# Patient Record
Sex: MALE | Race: WHITE | NOT HISPANIC OR LATINO | Employment: FULL TIME | URBAN - METROPOLITAN AREA
[De-identification: names, ages, dates, MRNs, and addresses within clinical notes are randomized per-mention and may not be internally consistent; named-entity substitution may affect disease eponyms.]

---

## 2018-09-21 ENCOUNTER — TRANSFERRED RECORDS (OUTPATIENT)
Dept: HEALTH INFORMATION MANAGEMENT | Facility: CLINIC | Age: 53
End: 2018-09-21

## 2018-10-09 ENCOUNTER — BEH TREATMENT PLAN (OUTPATIENT)
Dept: BEHAVIORAL HEALTH | Facility: CLINIC | Age: 53
End: 2018-10-09
Attending: PSYCHIATRY & NEUROLOGY

## 2018-10-09 ENCOUNTER — HOSPITAL ENCOUNTER (OUTPATIENT)
Dept: BEHAVIORAL HEALTH | Facility: CLINIC | Age: 53
End: 2018-10-09
Attending: PSYCHIATRY & NEUROLOGY
Payer: COMMERCIAL

## 2018-10-09 DIAGNOSIS — F31.81 DEPRESSED BIPOLAR II DISORDER (H): ICD-10-CM

## 2018-10-09 DIAGNOSIS — F31.81 BIPOLAR 2 DISORDER (H): ICD-10-CM

## 2018-10-09 PROCEDURE — 90791 PSYCH DIAGNOSTIC EVALUATION: CPT

## 2018-10-09 ASSESSMENT — PAIN SCALES - GENERAL: PAINLEVEL: NO PAIN (0)

## 2018-10-09 NOTE — PROGRESS NOTES
" Standard Diagnostic Assessment     CLIENT'S NAME: Dash Arizmendi  MRN:   [id number]  :   1965 AGE:53 year old SEX: male  ACCT. NUMBER: [id number]  DATE OF SERVICE: 10/09/18 Start Time:  1015 End Time:  1145    Home Phone [phone]   Work Phone Not on file.   Mobile [phone]     Preferred Phone:   May we leave a program related message? yes    Yes, the patient has been informed that any other mental health professional providing mental health services to me will need access to this Diagnostic Assessment in order to develop a treatment plan and receive payment.     Identifying Information:  Dash Arizmendi is a 53 year old, Choose not to answer,  male. Dash attended the   alone.     Reason for Referral: Dash was referred to [facility] ()  by Dr. Marni Suarez. Dash reports the reason for referral at this time is depression.    Dash verbalizes the following treatment/discharge goals: \"be able to concentrate so can do my job, fix the depression so I can be happier and normal, don't want to spend all my time either working or sleeping/want a normal life, want to enjoy hobbies again\".    Current Stressors/Losses/Disappointments: job, struggles to see if its the job that's the problem or him, some regret about leaving last job    Per Client, Review of Symptoms:  Mood (Depression/Anxiety/Leonarda/Anger): depressed mood, feeling of hopelessness, feeling of worthlessness, irritability, low interest in usual activities, thinking it would be better to die, constant worry, poor concentration, trouble remembering things, excessive sleep that does not feel restful, low motivation, low energy, having to check/recheck what has already been done, hearing muffled sounds or a bell (knows that it is not real), nightmares  Psychosis: occasionally hears muffled sounds  Trauma:none reported  Other:     Mental Health History:  Dash reports first onset of mental health symptoms probably in " 20's.  Dash was first diagnosed age 30, with bipolar.   Dash received the following mental health services in the past: counseling and psychiatry.   Psychiatric Hospitalizations: None.   Dash denies a history of civil commitment.      Onset/Duration/Pattern of Symptoms noted above:     Dash reports the following understanding of his diagnosis: depression, maybe bipolar.      Personal Safety:    [place]-Suicide Severity Rating Scale   Suicide Ideation   1.) Have you ever wished you were dead or that you could go to sleep and not wake up?     Lifetime: Yes, (if yes, please discribe) :  Past Month:  Yes, (if yes, please discribe) : Thoughts that it would be better if something happened to him and he    2.) Have you actually had any thoughts of killing yourself?   Lifetime:  Yes, (if yes, please discribe) : kind of. Used to have really constant thoughts about what it would be like to jump from a building: would you experience the hitting of the ground? Past Month:  No   3.) Have you been thinking about how you might do this?     Lifetime:  No Past Month:  No   4.) Have you had these thoughts and had some intention of acting on them?     Lifetime:  No Past Month:  No   5.) Have you started to work out the details of how to kill yourself?   Lifetime:  No Past Month:  No   6.) Do you intend to carry out this plan?      Lifetime:  No Past Month:  No   Intensity of Ideation   Intensity of ideation (1 being least severe, 5 being most severe):     Lifetime:  4, description of Ideation: it was pretty constant and seemed really attractive, but never took any steps to do anything like that                                                                                                Past Month:  1, description of Ideation: just these ideas that it would be better if something happened to me, but not really suicide. Gives example of giving blood recently with the thought that maybe something would go wrong, though he  says he also knew this was extremely unlikely   How often do you have these thoughts? 2-5 times in a week    When you have the thoughts how long do they last?  Less than 1 hours/some of the time   Can you stop thinking about killing yourself or wanting to die if you want to?  Does not attempt to control thoughts    Are there things - anyone or anything (i.e. family, Catholic, pain of death) that stopped you from wanting to die or acting on thoughts of suicide?  Protective factors definately stopped you from attempting suicide      What sort of reasons did you have for thinking about wanting to die or killing yourself (ie end pain, stop how you were feeling, get attention or reaction, revenge)?  Completely to end or stop the pain (youcouldn't go on living with the pain or how you were feeling)   Suicidal Behavior   (Suicide Attempt) - Have you made a suicide attempt?     Lifetime:  No Past Month: No   Have you engaged in self-harm (non-suicidal self-injury)?  No   (Interrupted Attempt) - Has there been a time when you started to do something to end your life but someone or something stopped you before you actually did anything?  No   (Aborted or Self-Interrupted Attempt) - Has there been a time when you started to do something to try to end your life but you stopped yourself before you actually did anything?  No   (Preparatory Acts of Behavior) - Have you taken any steps towards making suicide attempt or preparing to kill yourself (such as collecting pills, getting a gun, giving valuables away or writing a suicide note)? No   Actual Lethality/Medical Damage:   0. No physical damage or very minor physical damage (e.g., surface scratches).   1. Minor physical damage (e.g., lethargic speech; first-degree burns; mild bleeding; sprains).  2. Moderate physical damage; medical attention needed (e.g., conscious but sleepy, somewhat responsive; second-degree burns; bleeding of major vessel).  3. Moderately severe physical  damage; medical hospitalization and likely intensive care required (e.g., comatose with reflexes intact; third-degree burns less than 20% of body; extensive blood loss but can recover; major fractures).  4. Sever physical damage; medical hospitalization with intensive care required (e.g., comatose without reflexes; third-degree burs over 20% of body; extensive blood loss with unstable vital sign; major damage to a vital area).  5. Death    No attempts       2008  [facility]  Used with permission by Rochelle Paredes, PhD.               Guide to C-SSRS Risk Ratings   NO IDEATION:  with no active thoughts IDEATION: with a wish to die. IDEATION: with active thoughts. Risk Ratings   If Yes No No 0 - Very Low Risk   If NA Yes No 1 - Low Risk   If NA Yes Yes 2 - Low/moderate risk   IDEATION: associated thoughts of methods without intent or plan INTENT: Intent to follow through on suicide PLAN: Plan to follow through on suicide Risk Ratings cont...   If Yes No No 3 - Moderate Risk   If Yes Yes No 4 - High Risk   If Yes Yes Yes 5 - High Risk   The patient's ADDITIONAL RISK FACTORS and lack of PROTECTIVE FACTORS may increase their overall suicide risk ratings.      Additional Risk Factors:    Significant history of having untreated or poorly treated mental health symptoms   Protective Factors: Children in the home , Sense of responsibility to family, Positive social support and Positive therapeutic releationships       Risk Status   Risk Rating: Low to moderate risk- DA Staff:  SBAR to Tx team.    Additional information to support suicide risk rating:  OR There was no additional information to provide at this time.  Please see the above suicide risk rating information.       Additional Safety Questions:    Do you have a gun, weapons or other means (including medications) to harm yourself available to you? No   Do you take chances with your safety?   no   Have you ever thought about killing  someone else? No   Have you ever heard voices telling you to harm yourself or others? No       Supports:   From whom do you receive support and how often? (family/friends/agency) wife     Do your support people want/need education/resources? yes        Is there anything in your life (current or history) that is satisfying to you (include leisure interests/hobbies)?   yes relationship with family      Hope/Belief System:  Do you believe things can get better? yes that's why I'm here       Personal Safety Summary:          Dash denies current fears or concerns for personal safety.    Completed safety coping plan? yes        Substance Use History:   Reports alcohol use starting in early 20's. Also reports about a month in early 20's doing some experimenting with LSD. No LSD since then.In later 20's had a couple of friends express concern about amount of his alcohol use. Realized he was using it to self-medicate, sought out help, got on medications and stopped using alcohol like that, so was not drinking problematically. Says drinking alcohol does help him feel calmer sometimes still. Says he may have 2-5 drinks in a week, but no more than that. Says he is okay with no use while in program.     Substance Use Disorder Treatment: Dash is currently receiving the following services: No indications of CD issues.       CAGE-AID:  Have you ever felt you ought to cut down on your drinking or drug use?   Yes    Have people annoyed you by criticizing your drinking or drug use?   No    Have you ever felt bad or guilty about your drinking or drug use?   Yes    Have you ever had a drink or used drugs first thing in the morning to steady your nerves or to get rid of a hangover?  No    Do you feel these issues have been adequately addressed? Yes not drinking much, okay with no use.    Chemical Dependency Assessment Recommended?  No        Dash has a negative Cage-Aid score.       Legal History:    Dash reports that he has not  been involved with the legal system.   ________________________________________________________________________    Life Situation (Employment/School/Finances/Basic Needs):  Dash  is currently living with wife and son in a house.   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  Dash denies a history of gambling and denies a history of gambling treatment.     Dash reports his highest level of education is some college  Dash did not identify any learning problems   Dash describes academic performance as: good, but always found classes boring   Dash describes school social experience as: ok     Dash denies concerns regarding his current ability to meet basic needs.     Social/Family History:  Dash  reports he grew up in [place].   Dash was the first born of two children.   Dash reports his biological parents are both     Dash describes his childhood as did not really feel connected in his family. His dad  when Abisai was age [age]. His mom was briefly re- sometime after that and had another child (Abisai's half-brother). After she ended that marriage, it was just the three of them and to some extent Abisai's grandparents (maternal). Says he always felt a bit on the outside in that unit. He notes his mom and half-brother had same last name while he did not and this ws a meaningful indicator of his place in that system. He moved out after completing high school and never lived with her again. Had some issues with his mom, describes her as a very controlling person. Says when she  they had been estranged for some time.   Dash describes his current relationships with his family of origin as both parents are . Has no contact with  half-brother except for a text at [employer].     Dash identifies his relationship status as: . Together 20+ years, just got  in 2002. Says the marriage is good, makes several references to his wife being very aware of his behaviors and clear in expectations for not getting into unhealthy choices.   Dash identifies his sexual orientation as: opposite sex   Dash denies sexual health concerns.     Dash reports having one children. Son, age 15. Says he is a great kid, really feels good about being a parent    Dash describes the quantity/quality of his social relationships as not much time with friends        Significant Losses / Trauma / Abuse / Neglect Issues / Developmental Incidents:  Dash denies significant loss/trauma/abuse/neglect issues/developmental incidents         Dash denies personal  experience.     Mandaeism Preference/Spiritual Beliefs/Cultural Considerations:     A. Ethnic Self-Identification:  Dash self-identifies his race/ethnicities as:  and his preferred language to be English.   Dash reports he does not need the assistance of an . Dash  reports he does not need other support or modifications involved in therapy.      B. Do you experience cultural bias (the practice of interpreting judging behavior by standards inherent to one's own culture) by other people as a stressor? If yes, describe how this relates to overall mental health symptoms.  No    C. Are there any cultural influences that may need to be considered for your treatment?  (This includes historical, geographical and familial factors that affect assessment and intervention processes). No, Denies any cultural influences or concerns that need to be considered for treatment    Strengths/Vulnerabilities:   Dash identifies his personal strengths as: goal-focused, insightful, intelligent, open to learning and responsible parent .   Things that may interfere with the clients success  in treatment include: none reported.   Other identified areas of vulnerability include: Suicidal Ideation  Anxiety with/without panic attacks  Depressive symptoms.     Medical History / Physical Health Screen:     Primary Care Physician: Dash has a [facility] Provider, who is named Marni Suarez.   Last Physical Exam: greater than a year ago and client was encouraged to schedule an exam with PCP.    Mental Health Medication Management Provider / Psychiatrist: Dash has a psychiatrist whose name and location are: Dr. Suarez.         Current medications including prescription, non-prescription, herbals, dietary aids and vitamins:  Per client report:   Outpatient Prescriptions Marked as Taking for the 10/9/18 encounter (Hospital Encounter) with [facility]   Medication Sig     SERTRALINE HCL PO Take 100 mg by mouth 2 times daily     Topiramate (TOPAMAX PO) Take 100 mg by mouth       Dash reports current medications are: not sure.   Dash describes taking his medications as: Independent.  Dash reports taking prescribed medications as prescribed.     Dash provides the following current assessment of pain:  ;  ;  .     Dash provides the following information regarding past significant medical conditions/diagnoses:      Medical:  No past medical history on file.    Surgical:  No past surgical history on file.  Allergy:   Dash reports Allergies not on file     Family History of Medical, Mental Health and/or Substance Use problems:  Per client report: No family history on file.    Dash reports no current medical concerns.      General Health:   Have you had any exposure to any communicable disease in the past 2-3 weeks? no     Are you aware of safe sex practices? yes     Is there a possibility of pregnancy?  no       Nutrition:    Are you on a special diet? If yes, please explain:  no   Do you have any concerns regarding your nutritional status? If yes, please explain:  no   Have you  had any appetite changes in the last 3 months?  No     Have you had any weight loss or weight gain in the last 3 months?  No     Do you have a history of an eating disorder? no   Do you have a history of being in an eating disorder program? no   NOTE: BMI to be calculated following program admission.    Fall Risk:   Have you had any falls in the past 3 months? no     Do you currently use any assistive devices for mobility?   no     NOTE: If client reports 3 or more falls in the past 3 months, the client will not be accepted into the program until further assessment is completed by the program nurse. Check if a nurse is available to assess at time of DA.    NOTE: If client reports 2 falls in the past 3 months and/or the client currently uses assistive devices for mobility, the  will send an in-basket to the program nurse to meet with the client within the first week of programming.    Head Injury/Trauma:   Do you have a history of head injury / trauma? no     Do you have any cognitive impairment? no       Per completion of the Medical History / Physical Health Screen, is there a recommendation to see / follow up with a primary care physician/clinic?    No.      Clinical Findings     Mental Status Assessment/Clinical Observation:  Appearance:   awake, alert  Eye Contact:   good  Psychomotor Behavior: Normal  no evidence of tardive dyskinesia, dystonia, or tics  Attitude:   Cooperative    Oriented to:   All    Speech   Rate / Production: Normal    Volume:  Normal   Mood:    Depressed     Affect:    Constricted      Thought Content:  Clear  no evidence of suicidal ideation or homicidal ideation  Thought Form:  logical, linear and goal oriented no loose associations  Insight:    good    Judgment:     intact  Attention Span/Concentration: intact  Recent and Remote Memory:  intact      Psychiatric Diagnosis:    296.89 Bipolar II Disorder Depressed    Provisional Diagnostic Hypothesis (Explain R/O, other Provisional  "Diagnosis, and why alternative Diagnosis that were considered were ruled out):       Medical Concerns that may Impact Treatment:   None reported    Psychosocial and Contextual Factors (V-Codes):  V62.29 Other problem related to employment struggles with work    WHODAS 2.0 SCORE: 21/93 %      Client and family participation in assessment:   Dash was alone during this assessment.   This assessment does include collateral information.      Summary & Recommendations  Provide a brief summary of how diagnostic criteria is met (symptoms, duration & functional impairment), cause, prognosis, and likely consequences of symptoms. Include overview of pertinent client strengths, cultural influences, life situations, relationships, health concerns and how diagnosis interacts/impacts with client's life. Recommendations include: client preferences, prioritization of needed mental health, ancillary or other services and any referrals to services required by statute or rule.     Abisai is a 53-year-old, , ,  who has been severely impacted by symptoms of his mood disorder, being in a depressive phase currently. He has dealt with bipolar II disorder since his 20's and this has been a struggle at several points in his life in the intervening years, but the current depressive phase has negatively impacted his ability to work, made it difficult to do daily self-care, and has involved passive thoughts of suicide. He often hopes \"something will happen\" that result in his death, but he is not expressing any intent to act on those thoughts, exactly. He did recently donate blood with the idea that \"maybe\" something would go wrong and he would die, but he knew this was exceedingly unlikley. He also has thoughts about jumping off tall buildings and what it might feel like, but he says he then avoids going to high places. He says he never wants to do anything to hurt his son, but at the same time, the wish to die is " "quite clear. He typically likes his work and performs well at it, but this has not been the case in the last year or so and this is worse in the last month. He complains of poor concentration in particular getting in his way. He changed jobs about 6 months ago in the hopes of finding something better,and he says he is indeed working for a \"great company\", but he is still struggling. Distant background includes being raised by a single mother who is described as having \"some sort\" of mental illness and feeling like an outsider in his family and eventually having little to no engagement with his family of origin as he got older. His mother is  now, but he describes leaving a visit with her at one point in his late 20's, knowing he would not see her again because he would be dead (none of this turned out to be the case). This was one of his earliest memories of having thoughts about wanting to die or expecting to die. Abisai has had psychiatric care and a smattering of individual therapy for his mental health issues, but no history of hospitalizations. When his symptoms started to worsen this year, his psychiatrist tried some medication adjustments, but did not see any improvement an indeed became quite concerned at the last appointment as Abisai was beginning to have nightmares/increasing sleep disturbance and this she considered quite a negative sign of worsening. She referred Abisai to partial hospitalization for support in stabilization. Abisai wants to \"be able to concentrate so I can do my job, fix the depression so I can be happier and normal; I  don't want to spend all my time either working or sleeping/ I want a normal life, I want to enjoy hobbies again\".  This provides a good overview of how symptoms are broadly impacting Abisai and his goals for his life, and Partial does seem a reasonable step toward stabilization. He would likely benefit from a review of his medication regimen,  Learning to create more lifestyle " balance, increasing routine self-care, improving his support (currently his support is just his wife), learning ways to challenge his negative thinking that increases his stress levels, and starting to put more attention toward creating a life worth living. Follow up care will need to include psychiatric care and individual therapy and he does need help finding a therapist he can connect with. He may well benefit from joining a support group as well, to increase his social connectedness.       Prognosis is Good. Without the recommended intervention, the client is likely to experience the following consequences of their symptoms: increased risk for suicide, decreased ability to function, increased risk of hospitalization.    Referrals to services required by statute or rule:   Report to child/adult protection services was NA.   Referral to another professional/service is not indicated at this time..    Program Recommendation: [facility] () .      Assessment Completed by: Ju Gray

## 2018-10-09 NOTE — PROGRESS NOTES
"Adult Outpatient Mental Health Programs    MY COPING PLAN FOR SAFETY    NAME: Dash Arizmendi  MRN: [id number]  SAFETY PLAN:  Step 1: Warning signs / cues (Thoughts, images, mood, situation, behavior) that a crisis may be developing:    Thoughts: \"People would be better off without me\" and \"I can't do this anymore\"    Images: obsessive thoughts of death or dying: jumping from high places    Thinking Processes: intrusive thoughts (bothersome, unwanted thoughts that come out of nowhere): of I hate my life    Mood: worsening depression and hopelessness    Behaviors: isolating/withdrawing , using alcohol and not taking care of myself    Situations: n/a   Step 2: Coping strategies - Things I can do to take my mind off of my problems without contacting another person (relaxation technique, physical activity):    Distress Tolerance Strategies:  lose myself in TV show    Physical Activities: n/a    Focus on helpful thoughts:  \"This is temporary\" and remind myself of what is important to me: family  Step 3: People and social settings that provide distraction:   Name: Car     [employer]   Step 4: Remind myself of people and things that are important to me and worth living for:  My family  Step 5: When I am in crisis, I can ask these people to help me use my safety plan:   Name: Car Step 6: Making the environment safe:     be around others and avoid high places  Step 7: Professionals or agencies I can contact during a crisis:    Suicide Prevention Lifeline: 5-606-584-TALK (3078)    Crisis Text Line Service (available 24 hours a day, 7 days a week): Text [place] to 805263    Call  **CRISIS (816657) from a cell phone to talk to a team of professionals who can help you.  Crisis Services By County: Phone Number:   [place]     [phone]   [place]    [phone]   [place]    [phone]   [place]    [phone]   [place]    [phone]   [place] [phone]   [place]     [phone]       Call 911 or go to my nearest emergency " department.     I helped develop this safety plan and agree to use it when needed.  I have been given a copy of this plan.      Client signature _________________________________________________________________  Today s date:  10/9/2018  Adapted from Safety Plan Template 2008 Delicia Palma and Piter Kohli is reprinted with the express permission of the authors.  No portion of the Safety Plan Template may be reproduced without the express, written permission.  You can contact the authors at [email or web address] or [email or web address].

## 2018-10-09 NOTE — PROGRESS NOTES
Initial Individual Treatment Plan     Patient: Dash Arizmendi   MRN: [id number]  : 1965  Age: 53 year old  Sex: male    Diagnostic Assessment Date / Date of Initial Individual Treatment Plan: 10/09/18      Immediate Health Concerns:  No     Immediate Safety Concerns:  Yes. Identify safety concern and plan to address: passive thoguhts about     Identify the issues to be addressed in treatment:  Symptom Management, Personal Safety, Community Resources/Discharge Planning, Develop / Improve Independent Living Skills and Develop Socialization / Interpersonal Relationship Skills    Client Initial Individualized Goals for Treatment: be able to concentrate so can do my job, fix the depression so I can be happier and normal, don't want to spend all my time either working or sleeping/want a normal life, want to enjoy hobbies agian    Initial Treatment suggestions for the client during the time between Diagnostic Assessment and completion of the Individualized Treatment Plan:  Follow Safety Plan  Abstain from Substance Use   Ask for more information, support and/or assistance as needed.  Follow up with providers/community supports as needed: psychiatrist  Report increases or changes in symptoms to staff.  Report any personal safety concerns to staff.   Take medications as prescribed.  Report medication changes and/or side effects to staff.  Attend and participate in groups as scheduled or notify staff if unable to do so.  Report any use of substances to staff as this may impact your symptoms and/or personal safety.  Notify staff if you have any other issues that need to be addressed. This may include any current abuse / neglect / exploitation or other vulnerability.  Follow recommendations of your treatment team and discuss concerns if not in agreement.     Treatment Team Responsible: [facility] ()      Therapeutic Interventions/Treatment Strategies may include:  Support, Redirection, Feedback,  Limit/Boundaries, Safety Assessments, Structured Activity, Problem Solving, Clarification, Education, Motivational Enhancement and Relapse Prevention as needed.    Ju Gray

## 2018-10-09 NOTE — PROGRESS NOTES
Acknowledgement of Current Treatment Plan       I have reviewed my treatment plan with my therapist / counselor on 10/9/18. I agree with the plan as it is written in the electronic health record.    Name Signature   Dash Arizmendi    Name of Therapist / Counselor    YAIR White

## 2018-10-11 ENCOUNTER — HOSPITAL ENCOUNTER (OUTPATIENT)
Dept: BEHAVIORAL HEALTH | Facility: CLINIC | Age: 53
End: 2018-10-11
Attending: PSYCHIATRY & NEUROLOGY
Payer: COMMERCIAL

## 2018-10-11 PROBLEM — F31.81 DEPRESSED BIPOLAR II DISORDER (H): Status: ACTIVE | Noted: 2018-10-11

## 2018-10-11 PROCEDURE — H0035 MH PARTIAL HOSP TX UNDER 24H: HCPCS

## 2018-10-11 ASSESSMENT — ANXIETY QUESTIONNAIRES
1. FEELING NERVOUS, ANXIOUS, OR ON EDGE: NOT AT ALL
5. BEING SO RESTLESS THAT IT IS HARD TO SIT STILL: NOT AT ALL
6. BECOMING EASILY ANNOYED OR IRRITABLE: SEVERAL DAYS
3. WORRYING TOO MUCH ABOUT DIFFERENT THINGS: NOT AT ALL
2. NOT BEING ABLE TO STOP OR CONTROL WORRYING: SEVERAL DAYS
7. FEELING AFRAID AS IF SOMETHING AWFUL MIGHT HAPPEN: NOT AT ALL
IF YOU CHECKED OFF ANY PROBLEMS ON THIS QUESTIONNAIRE, HOW DIFFICULT HAVE THESE PROBLEMS MADE IT FOR YOU TO DO YOUR WORK, TAKE CARE OF THINGS AT HOME, OR GET ALONG WITH OTHER PEOPLE: SOMEWHAT DIFFICULT
GAD7 TOTAL SCORE: 3

## 2018-10-11 ASSESSMENT — PATIENT HEALTH QUESTIONNAIRE - PHQ9: 5. POOR APPETITE OR OVEREATING: SEVERAL DAYS

## 2018-10-11 NOTE — PROGRESS NOTES
Adult Mental Health Outpatient Group Therapy Progress Note     Client Initial Individualized Goals for Treatment:       Initial Treatment suggestions for the client during the time between Diagnostic Assessment and completion of the Individualized Treatment Plan:  Follow Safety Plan   Ask for more information, support and/or assistance as needed.  Follow up with providers/community supports as needed:   Report increases or changes in symptoms to staff.  Report any personal safety concerns to staff.   Take medications as prescribed.  Report medication changes and/or side effects to staff.  Attend and participate in groups as scheduled or notify staff if unable to do so.  Report any use of substances to staff as this may impact your symptoms and/or perrsonal safety.  Notify staff if you have any other issues that need to be addressed. This may include any current abuse / neglect / exploitation or other vulnerability.  Follow recommendations of your treatment team and discuss concerns if not in agreement.      Area of Treatment Focus:  Symptom Management and Develop / Improve Independent Living Skills    Therapeutic Interventions/Treatment Strategies:  Support, Feedback, Safety Assessment, Structured Activity, Problem Solving and Education    Response to Treatment Strategies:  Accepted Feedback, Gave Feedback, Listened, Focused on Goals, Attentive, Accepted Support and Alert    Name of Group:  Group therapy [id number], Interpersonal Effectiveness [id number]  Group Participants:     Description and Outcome:  Group therapy  Client reports he has bipolar disorder and  is currently depressed.  He said he is having focus and concentration problems and it is interfering with his ability to do his work.  He said he works from home so he hides how long it is taking him to complete his work.  He said he needs to learn skills to manage his symptoms so he can get back to a reasonable work schedule and rebuild his support network.   No SI reported.  Client verbalized understanding of session content by problem solving with group members and accepting feedback.  Interpersonal Effectiveness  Client participated in an exercise on values clarification.  Client used a card sort to examine important that are driving behavior, evaluation, and attitudes toward himself and his interactions with others.  Client reported difficulty choosing which values are important to him as the current stress makes everything seem more important.  Group members validated the feeling and discussed what they want versus what they are living currently.  Client benefitted from this group by problem solving and increasing self-awareness.      Is this a Weekly Review of the Progress on the Treatment Plan?  No

## 2018-10-11 NOTE — PROGRESS NOTES
Adult Mental Health Outpatient Group Therapy Progress Note     Client Initial Individualized Goals for Treatment:       Initial Treatment suggestions for the client during the time between Diagnostic Assessment and completion of the Individualized Treatment Plan:  Follow Safety Plan   Ask for more information, support and/or assistance as needed.  Follow up with providers/community supports as needed:   Report increases or changes in symptoms to staff.  Report any personal safety concerns to staff.   Take medications as prescribed.  Report medication changes and/or side effects to staff.  Attend and participate in groups as scheduled or notify staff if unable to do so.  Report any use of substances to staff as this may impact your symptoms and/or perrsonal safety.  Notify staff if you have any other issues that need to be addressed. This may include any current abuse / neglect / exploitation or other vulnerability.  Follow recommendations of your treatment team and discuss concerns if not in agreement.      Area of Treatment Focus:  Symptom Management and Develop / Improve Independent Living Skills    Therapeutic Interventions/Treatment Strategies:  Support, Feedback, Safety Assessment, Structured Activity, Problem Solving and Education    Response to Treatment Strategies:  Accepted Feedback, Gave Feedback, Listened, Focused on Goals, Attentive, Accepted Support and Alert    Name of Group:  Self-awareness 2-3    Group Participants: 5/5    Description and Outcome:  Discussed concept of self-compassion, reviewed three core elements of self-compassion, discussed value of self-compassion in reducing stress and supporting mental health, considered options for improving self-compassion.    Is this a Weekly Review of the Progress on the Treatment Plan?  No

## 2018-10-12 ENCOUNTER — HOSPITAL ENCOUNTER (OUTPATIENT)
Dept: BEHAVIORAL HEALTH | Facility: CLINIC | Age: 53
End: 2018-10-12
Attending: PSYCHIATRY & NEUROLOGY
Payer: COMMERCIAL

## 2018-10-12 DIAGNOSIS — F31.30 BIPOLAR I DISORDER, MOST RECENT EPISODE DEPRESSED (H): Primary | ICD-10-CM

## 2018-10-12 PROBLEM — F31.32 BIPOLAR 1 DISORDER, DEPRESSED, MODERATE (H): Status: ACTIVE | Noted: 2018-10-11

## 2018-10-12 PROCEDURE — H0035 MH PARTIAL HOSP TX UNDER 24H: HCPCS

## 2018-10-12 RX ORDER — LAMOTRIGINE 25 MG/1
TABLET ORAL
Qty: 240 TABLET | Refills: 0 | Status: SHIPPED | OUTPATIENT
Start: 2018-10-12 | End: 2021-11-15

## 2018-10-12 ASSESSMENT — PATIENT HEALTH QUESTIONNAIRE - PHQ9: SUM OF ALL RESPONSES TO PHQ QUESTIONS 1-9: 8

## 2018-10-12 ASSESSMENT — ANXIETY QUESTIONNAIRES: GAD7 TOTAL SCORE: 3

## 2018-10-12 NOTE — PROGRESS NOTES
Adult Mental Health Partial Hospitalization Group Therapy Progress Note     Date: 10/11/18    Client Initial Individualized Goals for Treatment: be able to concentrate so can do my job, fix the depression so I can be happier and normal, don't want to spend all my time either working or sleeping/want a normal life, want to enjoy hobbies agian     Initial Treatment suggestions for the client during the time between Diagnostic Assessment and completion of the Individualized Treatment Plan:  Follow Safety Plan  Abstain from Substance Use   Ask for more information, support and/or assistance as needed.  Follow up with providers/community supports as needed: psychiatrist  Report increases or changes in symptoms to staff.  Report any personal safety concerns to staff.   Take medications as prescribed.  Report medication changes and/or side effects to staff.  Attend and participate in groups as scheduled or notify staff if unable to do so.  Report any use of substances to staff as this may impact your symptoms and/or personal safety.  Notify staff if you have any other issues that need to be addressed. This may include any current abuse / neglect / exploitation or other vulnerability.  Follow recommendations of your treatment team and discuss concerns      Area of Treatment Focus:  Symptom Management, Personal Safety and Develop / Improve Independent Living Skills    Therapeutic Interventions/Treatment Strategies:  Support, Feedback, Safety Assessments, Structured Activity, Problem Solving, Clarification and Education    Response to Treatment Strategies:  Accepted Feedback, Listened, Focused on Goals, Attentive, Accepted Support and Alert    Name of Group:  OT life skills clinic: mental health management  Time: 1:00-1:50  Group member attendance: 5 of 6     Description and Outcome: Abisai attended and participated in an structured life skills group where purposeful experiential intervention focuses on psychoeducation,  exploration, practice, and generalizing taught independent living skills. Through the use of supportive social interactions, structured therapeutic and functional tasks in context, group members work towards stabilizing and managing mental health symptoms for improved participation and function in valued roles, routines, relationships, and independent living.     Abisai was provided an orientation to the purpose of OT life skills clinic including ways to use his time and energy in a way that supports his mental health recovery. He chooses a semi structured cognitive activity which he reports he has enjoyed in the past. He presents with calm even affect and reports poor focus in general. He easily exchanges validation and support with his peers and contributes to the milieu informal conversation appropriately. He would benefit from additional opportunities to practice the content to be able to generalize it to his everyday life with increased intentionality, consistency, and efficacy in support of his psychiatric recovery. Will continue to monitor and assess and work with Abisai in establishing treatment goals in subsequent sessions.     Is this a Weekly Review of the Progress on the Treatment Plan?  No

## 2018-10-12 NOTE — PROGRESS NOTES
Adult Mental Health Outpatient Group Therapy Progress Note     Client Initial Individualized Goals for Treatment:       Initial Treatment suggestions for the client during the time between Diagnostic Assessment and completion of the Individualized Treatment Plan:  Follow Safety Plan   Ask for more information, support and/or assistance as needed.  Follow up with providers/community supports as needed:   Report increases or changes in symptoms to staff.  Report any personal safety concerns to staff.   Take medications as prescribed.  Report medication changes and/or side effects to staff.  Attend and participate in groups as scheduled or notify staff if unable to do so.  Report any use of substances to staff as this may impact your symptoms and/or perrsonal safety.  Notify staff if you have any other issues that need to be addressed. This may include any current abuse / neglect / exploitation or other vulnerability.  Follow recommendations of your treatment team and discuss concerns if not in agreement.      Area of Treatment Focus:  Symptom Management and Develop / Improve Independent Living Skills    Therapeutic Interventions/Treatment Strategies:  Support, Feedback, Safety Assessment, Structured Activity, Problem Solving and Education    Response to Treatment Strategies:  Accepted Feedback, Gave Feedback, Listened, Focused on Goals, Attentive, Accepted Support and Alert    Name of Group:  Group psychotherapy 9-10; Network Development 10-11    Group Participants: 5/5    Description and Outcome:  Abisai participated in daily mindfulness practice. Says he feels tightness in his chest and in a band around his head. 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 Abisai recognized this was meant to be a positive piece of news, but he said it struck him as a blow and he has been dwelling on that. Shared some of his background that led  "to his coming to the partial hospitalization program. Says he felt insecure at his last job because he worked with so many \"really smart people\" and as a consequence,'developed an anxiety disorder\". He then got some treatment for that and was able to manage well enough. Then that company got bought up by a much larger company and he ended up with a new supervisor who he did not mesh with very well. Eventually decided to leave that company and took a different job. This job is mch less intellectually stimulating, he says, not as rewarding. Talked about ruminating about whether he should have stayed at the other company. Also notes that he developed depression and was not able to concentrate to do his job. Discussed problems with ruminating on the past and perhaps giving himself credit for the job he did and how much he learned and using his time to consider what he will do next.     Discussed value of effective support in managing mental health issues, as well as common behavior of people who have depression to never ask for support. Completed exercise to begin identifying support needs and possible ways to work through barriers to asking for support.    Is this a Weekly Review of the Progress on the Treatment Plan?  No    "

## 2018-10-12 NOTE — PROGRESS NOTES
Adult Mental Health Outpatient Group Therapy Progress Note     Client Initial Individualized Goals for Treatment:       Initial Treatment suggestions for the client during the time between Diagnostic Assessment and completion of the Individualized Treatment Plan:  Follow Safety Plan   Ask for more information, support and/or assistance as needed.  Follow up with providers/community supports as needed:   Report increases or changes in symptoms to staff.  Report any personal safety concerns to staff.   Take medications as prescribed.  Report medication changes and/or side effects to staff.  Attend and participate in groups as scheduled or notify staff if unable to do so.  Report any use of substances to staff as this may impact your symptoms and/or perrsonal safety.  Notify staff if you have any other issues that need to be addressed. This may include any current abuse / neglect / exploitation or other vulnerability.  Follow recommendations of your treatment team and discuss concerns if not in agreement.    Area of Treatment Focus:  Symptom Management, Develop / Improve Independent Living Skills     Therapeutic Interventions/Treatment Strategies:  Support, Feedback, Structured Activity, Problem Solving and Education    Response to Treatment Strategies:  Accepted Feedback, Listened, Focused on Goals, Attentive, Accepted Support     Group Participants: 5 of 6 absence due to hospitalization  Description and Outcome:  Relax and Review    Name of Group:  Relax and Review [id number]  Client participated in an education group on values.  Reviewed client s identified values and the affect his/her values are having on his life.  Information was presented on flexibility, adaptability, and change.  Client identified a value he wants to focus on increasing in his life and created actions steps for achieving his goal.  Discussed barriers to action steps and problem solved for improved follow through.  Client benefitted from the  session by creating action steps and problem solving with the group to increase follow through and work on goal.    Is this a Weekly Review of the Progress on the Treatment Plan?  Yes.      Are Treatment Plan Goals being addressed?  Yes, continue treatment goals      Are Treatment Plan Strategies to Address Goals Effective?  Yes, continue treatment strategies

## 2018-10-13 NOTE — H&P
PSYCHIATRIC PARTIAL HOSPITAL PROGRAM ADMISSION NOTE       PROGRAM START DATE: 10/11/2018        IDENTIFICATION:  Mr. Arizmendi is a 53-year-old   man who lives with his wife and 15-year-old son in [place].  His outpatient psychiatrist is Dr. Marni Suarez at [facility].  He has no therapist.  He was referred by his psychiatrist to the Partial Hospital Program for further evaluation and treatment of depression, anxiety, and panic.      HISTORY OF PRESENT ILLNESS:  Mr. Arizmendi was staffed by Dr. Bajwa on 10/12/2018.  He reports that he had depression and anxiety in high school.  He was not able to make it past his freshman year at college.  At age 30, he had a manic episode and was placed on lithium by a psychiatrist.  He says he does not remember a lot of it, but he recalls having elevated mood with racing thoughts.  He could not stop moving and doing things.  He had increased energy and decreased sleep, pressured speech and impulsivity.  He moved out of his affordable apartment and rented an apartment on [address] which had double the rent.  He did not have the money to do that.  He also sold his jeep which was paid for, and bought an [employer], which he could not afford.  That episode lasted a few months.  He went to the emergency room and was given some sort of medicine to calm down and was sent home.  He then saw a psychiatrist and was prescribed lithium, which he took for 2 years.  He was living in [place] at the time.  He says he had a subsequent episode of hypomania in 2008.  The economy had crashed.  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 Luckily he did okay at the business, although he used his savings to fund it.  He then went back to his old job and ended up with an even better career.  Since that time he has just had troubles with depression.  He started having suicidal  thoughts in 04/2018, although he had been feeling depressed and anxious for some time before that.  Currently he is mostly bothered by the depression and poor concentration.  He has been very unproductive at work and spends a lot of hours just to try to keep up. His depression has been a little less the last few weeks, but he is not back to baseline.  He is currently taking Zoloft 200 mg a day, which he has been on for a year, although he has only been at this dose for 2 weeks.  Topamax 100 mg at bedtime was added for sleep and that has helped a lot.  Before the Topamax, he was having initial and middle insomnia and now sleeps okay.  Appetite is normal.  He has gained 45 pounds over the past 4 years.  He is anhedonic.  Energy level is low.  Libido is poor.  Concentration is impaired.  He feels hopeless, helpless, worthless and guilty.  He denies crying spells.  He has had suicidal thoughts.  These started up in April.  These are better now and are passive only.  He says he would not care if he were hit by a bus.  He has no plan or intention to kill himself.  He does wonder what it would be like to jump off a bridge.  He is able to contract for safety.  He denies homicidal thoughts.  He says for all of his life, he has had possible auditory hallucinatory experiences.  He hears distant muffled conversations and hears a bell ringing.  There has been no change in this over the years.  When he hears the muffled conversation, it is just a short sentence and then it stops.  He started having panic attacks 16 years ago.  These would happen when he was worried about something.  It would last for minutes.  He says he has not had much of this since he was put on antidepressants in 01/2018, but they were bad for 4 years before that.  They occur only a few times a year, but are severe when they do.  He has gone to the ER for panic attacks.  He describes sudden onset of anxiety with sweaty palms, tachycardia, tunnel vision,  "feeling like he is about to \"shit my pants.\"  He will have weak knees and cannot stand up.  After minutes, he slowly goes back to normal.  He describes chronic excessive worry with muscle tension, restlessness, keyed up feeling, poor concentration, fatigue, irritability and sleep disturbance.  He denies PTSD symptoms, obsessive-compulsive symptoms, health concerns, eating disorder or gambling.  His memory is okay except he does not recall much of his childhood.      PAST PSYCHIATRIC HISTORY:  See history of present illness for details.  He has had depression and anxiety since high school.  He had a manic episode at age 30 and a hypomanic episode in 2008.  He started psychotherapy at age 30.  He started medications at age 30 when he was put on lithium for 2 years.  He has been on Prozac, Zoloft, Topamax, Abilify (caused insomnia), Risperdal (caused dizziness), Xanax, Vistaril, Klonopin and propranolol.  He has no history of suicide attempts.  He has had no psychiatric admissions.  He has no guns.  He has never had ECT.  He has no therapist.  His psychiatrist is Marni Suarez MD at [facility].      CHEMICAL DEPENDENCY HISTORY:  Mr. Arizmendi first drank at age 18.  Alcohol has been sporadic.  He says he can drink up to 5 drinks in the evening a few days per week.  He had blackouts in college.  He denies withdrawal symptoms, DTs, detox visits, DWIs or chemical dependency treatment.  He does not use drugs.  He quit smoking 20 years ago.      PAST MEDICAL HISTORY:  Mr. Arizmendi goes to [facility], but has no primary care physician.  He has a history of asthma, tonsillectomy.  No head injuries or seizures.  His weight has been a roller coaster.  He is 5 feet 7 inches.  He weighs 205 pounds.  His highest weight was 245 pounds.  His lowest weight as an adult was 145 pounds.      MEDICATIONS:     1.  Zoloft 200 mg daily.   2.  Topamax 100 mg at bedtime.      ALLERGIES:  NO KNOWN DRUG ALLERGIES.    " "  FAMILY HISTORY:  Mother had mental illness.  Maternal grandmother had mental illness.  Great aunt had schizophrenia and was institutionalized.  There is a history of alcoholism in his half brother and maternal uncle.  There is no family history of suicide.      SOCIAL HISTORY:  Mr. Arizmendi was born in [place], [place].  He was raised in [place], [place] by his mother.  His father  when Mr. Arizmendi was 2 years old in a motor vehicle accident.  Mother remarried but that lasted only a year.  He has a half brother from that second marriage and has no sisters.  Mother worked as a .  He denied any physical, sexual or emotional abuse.  He started college \"2-1/2 times,\" but never got through his freshman year.  He has been  for 15 years.  They have a 15-year-old son. He lives with his wife and son in [place].  He is a .  Work has been difficult due to his depression.  Wife home-schools their son.  He denies legal problems.  He was never in the .  He says he never had a good relationship with his mother, who is now .      MENTAL STATUS EXAMINATION:  Mr. Arizmendi is an adequately groomed 53-year-old  man looking his stated age.  He is heavyset.  Gait and station are normal.  Psychomotor activity is within normal limits.  Speech is fluent and normal in rate.  Language is normal.  Mood is depressed and anxious.  Affect is sad.  Attention and concentration appear adequate.  Thought processes:  He reports some vague auditory hallucinations, hearing muffled speech and a bell.  He has had this all of his life.  It has not changed.  He denies any other psychotic symptoms.  He endorses some passive suicidal thoughts.  He has no plan or intention to act on that.  He denies homicidal thoughts.  Fund of knowledge is adequate.  Remote and recent memory are adequate.  Insight and judgment appear adequate.  He was alert and oriented x 3.  There was no evidence of " movement disorder.      ASSESSMENT:  Mr. Arizmendi is a 53-year-old man with a history of depression and anxiety dating to high school.  He had a manic episode at age 30 and had a hypomanic episode in .  Since then he has struggled with depression and anxiety. He started having suicidal thoughts in April of this year and has been functioning poorly at work due to his depression.  He is currently on Zoloft and Topamax, and referred to the Partial Hospital Program for further mood stabilization.      DIAGNOSES:   Axis I:  Bipolar I disorder, depressed. Panic disorder without agoraphobia. Generalized anxiety disorder.   Axis II: No diagnosis.   Axis III:  Obesity. Asthma.      PLAN:   1.  Begin [facility] Partial Hospital Program.   2.  Begin Lamictal as mood stabilizer and to target his bipolar depression.   3.  Expect stabilization and completion of Partial Hospital Program.   4.  Follow up with his psychiatrist at [facility], Dr. Marni Suarez.         JUAN HALE MD             D: 10/12/2018   T: 10/12/2018   MT:       Name:     ED ARIZMENDI   MRN:      [id number]        Account:      [id number]   :      1965        Admitted:     10/11/2018                   Document: [id number]

## 2018-10-15 ENCOUNTER — HOSPITAL ENCOUNTER (OUTPATIENT)
Dept: BEHAVIORAL HEALTH | Facility: CLINIC | Age: 53
End: 2018-10-15
Attending: PSYCHIATRY & NEUROLOGY
Payer: COMMERCIAL

## 2018-10-15 PROCEDURE — H0035 MH PARTIAL HOSP TX UNDER 24H: HCPCS

## 2018-10-15 NOTE — PROGRESS NOTES
Adult Mental Health Outpatient Group Therapy Progress Note     Client Initial Individualized Goals for Treatment:       Initial Treatment suggestions for the client during the time between Diagnostic Assessment and completion of the Individualized Treatment Plan:  Follow Safety Plan   Ask for more information, support and/or assistance as needed.  Follow up with providers/community supports as needed:   Report increases or changes in symptoms to staff.  Report any personal safety concerns to staff.   Take medications as prescribed.  Report medication changes and/or side effects to staff.  Attend and participate in groups as scheduled or notify staff if unable to do so.  Report any use of substances to staff as this may impact your symptoms and/or perrsonal safety.  Notify staff if you have any other issues that need to be addressed. This may include any current abuse / neglect / exploitation or other vulnerability.  Follow recommendations of your treatment team and discuss concerns if not in agreement.      Area of Treatment Focus:  Symptom Management and Develop / Improve Independent Living Skills    Therapeutic Interventions/Treatment Strategies:  Support, Feedback, Safety Assessment, Structured Activity, Problem Solving and Education    Response to Treatment Strategies:  Accepted Feedback, Gave Feedback, Listened, Focused on Goals, Attentive, Accepted Support and Alert    Name of Group:  Anxiety Lab    Group Participants:  6 of 6    Description and Outcome:  Discussed impact of thinking about past and future on anxiety and emotional stress levels, as well as option of using a here/now focus to shift away from this type of triggering thinking. Reviewed ways to practice mindfulness that can promote that shift to the moment to moment focus.    Is this a Weekly Review of the Progress on the Treatment Plan?  No

## 2018-10-15 NOTE — PROGRESS NOTES
"Adult Mental Health Outpatient Group Therapy Progress Note     Client Initial Individualized Goals for Treatment:       Initial Treatment suggestions for the client during the time between Diagnostic Assessment and completion of the Individualized Treatment Plan:  Follow Safety Plan   Ask for more information, support and/or assistance as needed.  Follow up with providers/community supports as needed:   Report increases or changes in symptoms to staff.  Report any personal safety concerns to staff.   Take medications as prescribed.  Report medication changes and/or side effects to staff.  Attend and participate in groups as scheduled or notify staff if unable to do so.  Report any use of substances to staff as this may impact your symptoms and/or perrsonal safety.  Notify staff if you have any other issues that need to be addressed. This may include any current abuse / neglect / exploitation or other vulnerability.  Follow recommendations of your treatment team and discuss concerns if not in agreement.      Area of Treatment Focus:  Symptom Management and Develop / Improve Independent Living Skills    Therapeutic Interventions/Treatment Strategies:  Support, Feedback, Safety Assessment, Structured Activity, Problem Solving and Education    Response to Treatment Strategies:  Accepted Feedback, Gave Feedback, Listened, Focused on Goals, Attentive, Accepted Support and Alert    Name of Group:  Group therapy [id number], Mental Health Management [id number]  Group Participants:  6 of 6    Description and Outcome:  Group therapy  Client reported a \"bumpy\" weekend.  He said he was negative with himself.  He said he started thinking he just needed to pull himself up by his bootstraps and get back to work.  He said he did use some distraction over the weekend but did not feel better.  He said he isolated most of the weekend but his wife was able to drag him out for a bit.  Discussed function of his thinking and he was unable to " posit any ideas but said he was willing to think about it to try and figure it out.  No SI reported.  Client verbalized understanding of session content by problem solving with group members and accepting feedback.  Mental Health Management  Client participated in an education session on self-validation.  The biospsychosocial model of emotion regulation was presented in relation to emotions affecting thinking.  Discussed the effect of heightened emotions on self-talk and the increased tendency to distort thinking.  Client related experiences of distorted thinking and group members.  Self-validation was presented as a way to decrease emotions and increase clarity in thought.  Validation was defined and steps to practice for increased validation were presented.  Client reported understanding the information presented and willingness to try the skills. Client would benefit from continued practice of self-validation.    Is this a Weekly Review of the Progress on the Treatment Plan?  No

## 2018-10-15 NOTE — PROGRESS NOTES
[facility]   Dr. Bajwa's Psychiatric Progress Note  10/15/2018      Patient:  Dash Arizmendi   Medical Record Number:  [id number]  :  1965          Interim History:   The patient's care was discussed with the treatment team and chart notes were reviewed.  Had a quiet weekend.  Wife dragged pt to a puppet show son was involved in.  Mostly stayed home and went over PHP homework.  Mood has been up and down.  He sometimes thinks just needs to pull self up by the bootstraps and not come to [facility].      Psychiatric ROS:  Mood:   up and down, mostly depressed, not anxious  Sleep:normal with Topamax  Appetite:normal  Eating:normal  Energy Level:LOW  Concentration/Memory Problems:  NO  Suicidal Thoughts:No  Homicidal Thoughts:No  Psychotic Symptoms: No  Medication Side Effects:Yes takes longer to ejaculate and it just dribbles;    Medication Compliance:Yes   Physical Complaints:normal         Medications:     PAST PSYCH MEDS:  Prozac, Zoloft, Topamax, Abilify (caused insomnia), Risperdal (caused dizziness), Xanax, Vistaril, Klonopin, propranolol and Lamictal    Current Outpatient Prescriptions   Medication Sig     lamoTRIgine (LAMICTAL) 25 MG tablet Take 1 po daily x 1 week then increase every week by 25mg until reaching target dose of 200 mg/d     RisperiDONE (RISPERDAL PO)      SERTRALINE HCL PO Take 100 mg by mouth 2 times daily     Topiramate (TOPAMAX PO) Take 100 mg by mouth     No current facility-administered medications for this encounter.              Allergies:   No Known Allergies         Psychiatric Examination:   There were no vitals taken for this visit.  Weight is 0 lbs 0 oz  There is no height or weight on file to calculate BMI.    Appearance:  awake, alert and adequately groomed  Attitude:  cooperative  Eye Contact:  good  Mood:  depressed  Affect:  mood congruent  Speech:  clear, coherent  Psychomotor Behavior:  no evidence of tardive dyskinesia, dystonia, or  tics  Throught Process:  logical  Associations:  no loose associations  Thought Content:  no evidence of suicidal ideation or homicidal ideation and no evidence of psychotic thought  Insight:  good  Judgement:  intact  Oriented to:  time, person, and place  Attention Span and Concentration:  intact  Recent and Remote Memory:  intact  Gait:Normal    Risk/Potential for Dangerousness:  Multiple Active Diagnoses:HIGH  Self Care:HIGH  Suicide:LOW  Assault:LOW  Self Injurious Behaviors:LOW  Inappropriate Sexual Behavior:LOW         Labs:   No results found for this or any previous visit (from the past 24 hour(s)).     No results found for this or any previous visit (from the past 1008 hour(s)).      Impression:   This is a 53 year old male continues PHP for mood stabilization.  Mood is depressed.  Not anxious.  Has sexual SE.          DIagnoses:     Axis I:  Bipolar I disorder, depressed. Panic disorder without agoraphobia. Generalized anxiety disorder.   Axis II: No diagnosis.   Axis III:  Obesity. Asthma.            Plan:     Continue [facility] Partial Hospital Program.   Started Lamictal as mood stabilizer and to target his bipolar depression.   Hold Zoloft in AM days wants to have sexual activity and then take in the evening afterwards.  It that doesn't work, may try Wellbutrin.    Expect stabilization and completion of Partial Hospital Program.   Follow up with his psychiatrist at [facility], Dr. Marni Suarez.     Eligio Bajwa MD

## 2018-10-16 ENCOUNTER — HOSPITAL ENCOUNTER (OUTPATIENT)
Dept: BEHAVIORAL HEALTH | Facility: CLINIC | Age: 53
End: 2018-10-16
Attending: PSYCHIATRY & NEUROLOGY
Payer: COMMERCIAL

## 2018-10-16 PROCEDURE — H0035 MH PARTIAL HOSP TX UNDER 24H: HCPCS

## 2018-10-16 NOTE — PROGRESS NOTES
Adult Mental Health Partial Hospitalization Group Therapy Progress Note     Date: 10/12/18    Client Initial Individualized Goals for Treatment: be able to concentrate so can do my job, fix the depression so I can be happier and normal, don't want to spend all my time either working or sleeping/want a normal life, want to enjoy hobbies agian     Initial Treatment suggestions for the client during the time between Diagnostic Assessment and completion of the Individualized Treatment Plan:  Follow Safety Plan  Abstain from Substance Use   Ask for more information, support and/or assistance as needed.  Follow up with providers/community supports as needed: psychiatrist  Report increases or changes in symptoms to staff.  Report any personal safety concerns to staff.   Take medications as prescribed.  Report medication changes and/or side effects to staff.  Attend and participate in groups as scheduled or notify staff if unable to do so.  Report any use of substances to staff as this may impact your symptoms and/or personal safety.  Notify staff if you have any other issues that need to be addressed. This may include any current abuse / neglect / exploitation or other vulnerability.  Follow recommendations of your treatment team and discuss concerns      Area of Treatment Focus:  Symptom Management, Personal Safety and Develop / Improve Independent Living Skills    Therapeutic Interventions/Treatment Strategies:  Support, Feedback, Safety Assessments, Structured Activity, Problem Solving, Clarification and Education    Response to Treatment Strategies:  Accepted Feedback, Listened, Focused on Goals, Attentive, Accepted Support and Alert    Name of Group:  OT life skills: mental health management  Time: 1:00-1:50  Group member attendance: 5 of 6     Description and Outcome: Abisai attended and participated in an structured life skills group where purposeful experiential intervention focuses on psychoeducation, exploration,  practice, and generalizing taught independent living skills. Through the use of supportive social interactions, structured therapeutic and functional tasks in context, group members work towards stabilizing and managing mental health symptoms for improved participation and function in valued roles, routines, relationships, and independent living.     Focus of today s session is on the psychological benefits of an experiential embodied mind practice to gain self-awareness of how mindfulness can help with emotional self-regulation.  Psychoeducation on mindfulness and ways to be mindful were reviewed and discussed, including active meditation practices such as Yoga and Shady Chi. Shady Chi is an evidence based practice that employs structured rhythmic patterns of movement that are synchronized with the individual's breathing, focused attention, and principles around the quality of the movement and postures (body based) to activate the parasympathetic nervous system and calm the mind. The teaching and practice of the active meditation practice of Shady Chi Sun Style was provided by writer (Certified Shady Chi for ).  All aspects of the practice were adapted to individual client needs with an emphasis on safety and comfort to enhance the practice for improved engagement in the embodied mind practice. Abisai demonstrates good focus and reports feeling comfortable with the gentle movements. He reflects on how grounding it felt. Abisai would benefit from additional opportunities to practice the content to be able to generalize it to his everyday life with increased intentionality, consistency, and efficacy in support of his psychiatric recovery. Will continue to monitor and assess and work with Abisai in establishing treatment goals in subsequent sessions.     Is this a Weekly Review of the Progress on the Treatment Plan?  Yes.      Are Treatment Plan Goals being addressed?  Yes, continue treatment goals      Are  Treatment Plan Strategies to Address Goals Effective?  Yes, continue treatment strategies      Are there any current contracts in place?  No

## 2018-10-16 NOTE — PROGRESS NOTES
Adult Mental Health Partial Hospitalization Group Therapy Progress Note     Date: 10/15/18    Client Initial Individualized Goals for Treatment: be able to concentrate so can do my job, fix the depression so I can be happier and normal, don't want to spend all my time either working or sleeping/want a normal life, want to enjoy hobbies agian     Initial Treatment suggestions for the client during the time between Diagnostic Assessment and completion of the Individualized Treatment Plan:  Follow Safety Plan  Abstain from Substance Use   Ask for more information, support and/or assistance as needed.  Follow up with providers/community supports as needed: psychiatrist  Report increases or changes in symptoms to staff.  Report any personal safety concerns to staff.   Take medications as prescribed.  Report medication changes and/or side effects to staff.  Attend and participate in groups as scheduled or notify staff if unable to do so.  Report any use of substances to staff as this may impact your symptoms and/or personal safety.  Notify staff if you have any other issues that need to be addressed. This may include any current abuse / neglect / exploitation or other vulnerability.  Follow recommendations of your treatment team and discuss concerns      Area of Treatment Focus:  Symptom Management, Personal Safety and Develop / Improve Independent Living Skills    Therapeutic Interventions/Treatment Strategies:  Support, Feedback, Safety Assessments, Structured Activity, Problem Solving, Clarification and Education    Response to Treatment Strategies:  Accepted Feedback, Listened, Focused on Goals, Attentive, Accepted Support and Alert    Name of Group:  OT life skills: goal integration  Time: 1:00-1:50  Group member attendance: 6 of 6     Description and Outcome: Abisai attended and participated in an structured life skills group where purposeful experiential intervention focuses on psychoeducation, exploration, practice,  and generalizing taught independent living skills. Through the use of supportive social interactions, structured therapeutic and functional tasks in context, group members work towards stabilizing and managing mental health symptoms for improved participation and function in valued roles, routines, relationships, and independent living.     To support progress towards treatment goals and psychiatric recovery, group members were led through a structured process to integrate new learning, skills, and emerging self-awareness into their daily and weekly life. The process includes:   1. Brief psychoeducation on evidence around the neuro-science of change with regards to setting small achievable goals.  2. Brief education on components of self-compassion in context of setting goals provided.  3. Brief psychoeducation and practice of 2 positive psychology skills: GLAD and 3 good things.   4. Write smart goals for the next week in 3 of the following areas: self-compassion, mindfulness, connections, wellness, lifestyle balance, discharge planning, and coping skills.   5. Integrate the goals into their life using a provided weekly planner. Encouraged to highlight the highlights as the week progresses.  6. Sharing intentions with the group for accountability, and to also receive support from peers as the week progresses  Abisai presents with calm even affect today. He was able to set the following 3 SMART goals for the week in support of his psychiatric recovery:   1) Self Compassion: Gratitude journal daily.  2) Mindfulness: Practice paired muscle relaxation meditation 3x  3) Wellness: Walk around the block 3x this week.     Abisai easily exchanges validation and support with is peers. He would benefit from additional opportunities to practice the content to be able to generalize it to his everyday life with increased intentionality, consistency, and efficacy in support of his psychiatric recovery.      Is this a Weekly Review of  the Progress on the Treatment Plan?  No.

## 2018-10-16 NOTE — PROGRESS NOTES
"Adult Mental Health Outpatient Group Therapy Progress Note     Client Initial Individualized Goals for Treatment:       Initial Treatment suggestions for the client during the time between Diagnostic Assessment and completion of the Individualized Treatment Plan:  Follow Safety Plan   Ask for more information, support and/or assistance as needed.  Follow up with providers/community supports as needed:   Report increases or changes in symptoms to staff.  Report any personal safety concerns to staff.   Take medications as prescribed.  Report medication changes and/or side effects to staff.  Attend and participate in groups as scheduled or notify staff if unable to do so.  Report any use of substances to staff as this may impact your symptoms and/or perrsonal safety.  Notify staff if you have any other issues that need to be addressed. This may include any current abuse / neglect / exploitation or other vulnerability.  Follow recommendations of your treatment team and discuss concerns if not in agreement.      Area of Treatment Focus:  Symptom Management and Develop / Improve Independent Living Skills    Therapeutic Interventions/Treatment Strategies:  Support, Feedback, Safety Assessment, Structured Activity, Problem Solving and Education    Response to Treatment Strategies:  Accepted Feedback, Gave Feedback, Listened, Focused on Goals, Attentive, Accepted Support and Alert    Name of Group:  Group psychotherapy 1-2, Support Network Education 3-6    Group Participants:  5 of 6    Description and Outcome:  Abisai participated in daily mindfulness practice. Says yesterday was \"not too bad\". Took his son to get some things his son needed and they got to spend time together, which was positive. This morning he practiced mindfulness while folding laundry and the laundry was \"folded better than I've ever folded laundry\".  Discussed his statement that he \"did not actively work on anything yesterday\", explained that the hope is " that with the information received in program that clients practice what they want to try in a time of their choosing, it is not something that is expected that they are constantly pushing to try everything that is talked about.    Participated in viewing film on experience of depression, importance and role of support, importance of effective treatment. Discussed general ways to be supportive of people dealing with depression, anxiety, mental health problems. Completed exercise to more clearly articulate his support needs with his wife, who had been invited to this group..      Is this a Weekly Review of the Progress on the Treatment Plan?  No

## 2018-10-17 ENCOUNTER — HOSPITAL ENCOUNTER (OUTPATIENT)
Dept: BEHAVIORAL HEALTH | Facility: CLINIC | Age: 53
End: 2018-10-17
Attending: PSYCHIATRY & NEUROLOGY
Payer: COMMERCIAL

## 2018-10-17 VITALS — HEIGHT: 67 IN | BODY MASS INDEX: 32.33 KG/M2 | WEIGHT: 206 LBS

## 2018-10-17 PROCEDURE — H0035 MH PARTIAL HOSP TX UNDER 24H: HCPCS

## 2018-10-17 NOTE — PROGRESS NOTES
Adult Mental Health Outpatient Group Therapy Progress Note     Client Initial Individualized Goals for Treatment:       Initial Treatment suggestions for the client during the time between Diagnostic Assessment and completion of the Individualized Treatment Plan:  Follow Safety Plan   Ask for more information, support and/or assistance as needed.  Follow up with providers/community supports as needed:   Report increases or changes in symptoms to staff.  Report any personal safety concerns to staff.   Take medications as prescribed.  Report medication changes and/or side effects to staff.  Attend and participate in groups as scheduled or notify staff if unable to do so.  Report any use of substances to staff as this may impact your symptoms and/or perrsonal safety.  Notify staff if you have any other issues that need to be addressed. This may include any current abuse / neglect / exploitation or other vulnerability.  Follow recommendations of your treatment team and discuss concerns if not in agreement.      Area of Treatment Focus:  Symptom Management and Develop / Improve Independent Living Skills    Therapeutic Interventions/Treatment Strategies:  Support, Feedback, Safety Assessment, Structured Activity, Problem Solving and Education    Response to Treatment Strategies:  Accepted Feedback, Gave Feedback, Listened, Focused on Goals, Attentive, Accepted Support and Alert    Name of Group:  Mental Health Management 1-11    Group Participants:  6/7    Description and Outcome:  Participated in discussion of emotional regulation skills and utilizing these to create a lifestyle that provides for more emotional resilience and how these can be used when dealing with anxiety and depression.    Is this a Weekly Review of the Progress on the Treatment Plan?  No

## 2018-10-17 NOTE — PROGRESS NOTES
Adult Partial Hospitalization Program:  Individualized Treatment Plan     Date of Plan: 10/17/18    Name: Dash Arizmendi MRN: [id number]  :   1965    Programs: Adult Partial Hospitalization Program    DSM5 Diagnosis:  Bipolar I disorder, depressed. Panic disorder without agoraphobia. Generalized anxiety disorder.     Team Members Contributing to Plan:  Ju Gray MA/BILLIE; Murray Rockwell, LP; Rachel Muniz RN, PHN; LINDA Harden/L; Ana Chris [facility], BC-DMT; Eligio Bajwa MD    Client Strengths:  Dash identifies his personal strengths as: goal-focused, insightful, intelligent, open to learning and responsible parent .     Client Participation in Plan:  Contributed to goals and plan   Attended individual treatment plan meeting on 10/17/18  Agrees with plan   Received copy of treatment plan   Discussed with staff     Areas of Vulnerability:  Things that may interfere with the clients success in treatment include: none reported.   Other identified areas of vulnerability include: Suicidal Ideation  Anxiety with/without panic attacks  Depressive symptoms.     Long-Term Goals:  Knowledge about illness and management of symptoms   Maintenance of personal safety     Abuse Prevention Plan:  Safe, therapeutic environment   Safety coping plan as needed   Education regarding illness and skill development   Coordination with care providers     Discharge Criteria:  Satisfactory progress toward treatment goals   Improvement re: identified problems and symptoms   Ability to continue recovery at next level of service   Has a discharge plan in place   Regular attendance as scheduled       Start date: 10/11/18  Anticipated Discharge Date: 10/24/18 (pending authorization if needed)    Dash Arizmendi would be at reasonable risk of requiring inpatient hospitalization in the absence of partial hospitalization.     Dash Arizmendi continues to meet criteria to participate in the Adult Partial Hospitalization  "Program, 5 days per week. 10/25/18 As of today, no longer meets criteria for Partial, will be going to IOP now.      Areas of Treatment Focus       Area of Treatment Focus:  Personal Safety  Start Date:    10/1718    Problem Description:  From DA: Personal Safety Summary:    Dash denies current fears or concerns for personal safety.   Completed safety coping plan? Yes      Goal: Target Date: 10/22/18 Status: Active  Client will notify staff when needing assistance to develop or implement a coping plan to manage suicidal or self injurious urges.  Client will use coping plan for safety, as needed.      Progress:   10/22/18: Feels safe. Talked with his wife.   10/17/18: Abisai met with team members discussed program, process, progress. Discussed and set treatment goals.     Treatment Strategies:   Assist clients in establishing / strengthening support network  Assist to identify treatment goals  Engage in safety planning when indicated  Facilitate increased self awareness     Area of Treatment Focus:  Symptom Management  Start Date:    10/22/18    Description:  Dash verbalizes the following treatment/discharge goals: \"be able to concentrate so can do my job, fix the depression so I can be happier and normal, don't want to spend all my time either working or sleeping/want a normal life, want to enjoy hobbies again\".    Goal: Target Date: 10/22/18 Status: Active    Abisai will:    Process triggers in a self compassionate way and learn how to treat himself with kindness.    Learn ways to process reactions to unhelpful comments and reframe his thought processes.      Progress:  10/22/18: Progressing.    10/17/18: Abisai met with team members discussed program, process, progress. Discussed and set treatment goals.     Treatment Strategies:   Assist clients in establishing / strengthening support network  Assist to identify treatment goals  Engage in safety planning when indicated  Facilitate increased self awareness  Provide " education regarding self compassion, anxiety, coping skills, cognitive reframing, self validation and authenticity, grief and loss, self awareness, thoughts/feelings/behaviors, behavior activation, communication skills     Area of Treatment Focus:  Develop / Improve Independent Living Skills  Start Date:   10/17/18    Goal: Target Date: 10/22/18 Status: Completed  In Life Skills Abisai will:    Learn, practice, apply 2 skills/strategies for improved lifestyle balance with an emphasis on leisure and setting healthy boundaries for his time and energy.    Learn, practice, generalize 2 sensory or mindfulness based self regulation skills for improved concentration in his meaningful life roles, routines, and relationships.      Progress:  10/25/18: Abisai has decided to postpone going back to work and will attend day treatment program. Recommend goals will be continued in that setting until updated. He has made progress in understanding need for leisure and self care. He prefers focused activity for mindfulness and has found the progressive muscle relaxation and breathing helpful.  10/22/18: Progressing. Working on mindfulness and progressive muscle reaction. Continues to work on focus and concentration.   10/17/18: Abisai met with team members discussed program, process, progress. Discussed and set treatment goals.     Treatment Strategies:   Assist to identify treatment goals  Engage in safety planning when indicated  Facilitate increased self awareness  Provide education regarding Lifestyle balance/structure/routine, prioritizing, stress management, leisure, self care, self compassion, goal setting and integrating, behavior activation, focused structured activity.        Area of Treatment Focus:  Community Resources/Discharge Planning  Start Date:   10/17/18    Description:   Psychiatrist/Med Mgmt: Dr. Suarez [facility]   Individual Therapist: Needs a therapist    Goal: Target Date: 10/22/18 Status:  Active  Will develop an aftercare / transition plan by discharge date    Progress:  10/22/18: Abisai states insurance says Wednesday is his last date. Not sleeping well due to sick cat. Will check. Will call and make intake appt with therapist from staff resources. Is thinking about what to do about going back to work and will let staff know his intentions.  10/17/18: Abisai met with team members discussed program, process, progress. Discussed and set treatment goals. Staff will provide resources for local Psychotherapists. He is concerned about nightmares. He will bring it up with Dr. Bajwa.     Treatment Strategies:   Assist clients in establishing / strengthening support network  Assist to identify treatment goals  Assist with discharge planning  Engage in safety planning when indicated  Facilitate increased self awareness  Provide education regarding relapse management, community resources, stress management, wellness: nutrition, medication management, sleep hygiene, physical activity     NOTE: Signatures are completed manually and scanned into the electronic medical record.

## 2018-10-17 NOTE — PROGRESS NOTES
Adult Mental Health Partial Hospitalization Group Therapy Progress Note     Date: 10/16/18    Client Initial Individualized Goals for Treatment: be able to concentrate so can do my job, fix the depression so I can be happier and normal, don't want to spend all my time either working or sleeping/want a normal life, want to enjoy hobbies agian     Initial Treatment suggestions for the client during the time between Diagnostic Assessment and completion of the Individualized Treatment Plan:  Follow Safety Plan  Abstain from Substance Use   Ask for more information, support and/or assistance as needed.  Follow up with providers/community supports as needed: psychiatrist  Report increases or changes in symptoms to staff.  Report any personal safety concerns to staff.   Take medications as prescribed.  Report medication changes and/or side effects to staff.  Attend and participate in groups as scheduled or notify staff if unable to do so.  Report any use of substances to staff as this may impact your symptoms and/or personal safety.  Notify staff if you have any other issues that need to be addressed. This may include any current abuse / neglect / exploitation or other vulnerability.  Follow recommendations of your treatment team and discuss concerns      Area of Treatment Focus:  Symptom Management, Personal Safety and Develop / Improve Independent Living Skills    Therapeutic Interventions/Treatment Strategies:  Support, Feedback, Safety Assessments, Structured Activity, Problem Solving, Clarification and Education    Response to Treatment Strategies:  Accepted Feedback, Listened, Focused on Goals, Attentive, Accepted Support and Alert    Name of Group:  OT life skills:mental health management  Time: 2:00-2:50  Group member attendance: 5 of 6     Description and Outcome: Abisai attended and participated in an structured life skills group where purposeful experiential intervention focuses on psychoeducation, exploration,  practice, and generalizing taught independent living skills. Through the use of supportive social interactions, structured therapeutic and functional tasks in context, group members work towards stabilizing and managing mental health symptoms for improved participation and function in valued roles, routines, relationships, and independent living.     Topic today is on emotional self-regulation with an emphasis on becoming aware of needs and employing sensory input/modalities for improved distress tolerance. Concepts covered included identifying/rating level of nervous system arousal followed by  experiential learning about internal and external sensory input that they personally find calming or alerting. Psychoeducation on two brief sensory based self regulation skills was provided: 5 finger grounding, and TIPP (temperature, intense exercise, paced breathing, paired muscle relaxation).  Abisai is able to participate, calm even affect. He reports he never thought of using his senses to help him but is willing to try. Abisai easily exchanges validation and support with is peers. He would benefit from additional opportunities to practice the content to be able to generalize it to his everyday life with increased intentionality, consistency, and efficacy in support of his psychiatric recovery.      Is this a Weekly Review of the Progress on the Treatment Plan?  No.

## 2018-10-17 NOTE — PROGRESS NOTES
Acknowledgement of Current Treatment Plan       I have reviewed my treatment plan with my therapist / counselor on 10/22/18.   I agree with the plan as it is written in the electronic health record.    Name:      Signature:  Dash Bajwa MD  Psychiatrist    Ju Gray MA, LP  Psychotherapist    Murray Rockwell,   Psychotherapist    Rachel Muniz RN, PHN  Nurse Liaison    LINDA Harden/L  Occupational Therapist    Ana Chris [facility], BC-DMT  Psychotherapist

## 2018-10-18 ENCOUNTER — HOSPITAL ENCOUNTER (OUTPATIENT)
Dept: BEHAVIORAL HEALTH | Facility: CLINIC | Age: 53
End: 2018-10-18
Attending: PSYCHIATRY & NEUROLOGY
Payer: COMMERCIAL

## 2018-10-18 PROCEDURE — H0035 MH PARTIAL HOSP TX UNDER 24H: HCPCS

## 2018-10-18 NOTE — PROGRESS NOTES
Adult Mental Health Outpatient Group Therapy Progress Note     Client Initial Individualized Goals for Treatment: be able to concentrate so can do my job, fix the depression so I can be happier and normal, don't want to spend all my time either working or sleeping/want a normal life, want to enjoy hobbies again      Initial Treatment suggestions for the client during the time between Diagnostic Assessment and completion of the Individualized Treatment Plan:  Follow Safety Plan   Ask for more information, support and/or assistance as needed.  Follow up with providers/community supports as needed:   Report increases or changes in symptoms to staff.  Report any personal safety concerns to staff.   Take medications as prescribed.  Report medication changes and/or side effects to staff.  Attend and participate in groups as scheduled or notify staff if unable to do so.  Report any use of substances to staff as this may impact your symptoms and/or perrsonal safety.  Notify staff if you have any other issues that need to be addressed. This may include any current abuse / neglect / exploitation or other vulnerability.  Follow recommendations of your treatment team and discuss concerns if not in agreement.      Area of Treatment Focus:  Symptom Management, Personal Safety, Community Resources/Discharge Planning and Develop / Improve Independent Living Skills    Therapeutic Interventions/Treatment Strategies:  Support, Feedback, Safety Assessments, Structured Activity, Problem Solving and Education    Response to Treatment Strategies:  Accepted Feedback, Gave Feedback, Listened, Focused on Goals, Attentive, Accepted Support and Alert     Name of Group: Group Therapy Date/Time: 10/17/2018 / 0900 to 0950; Group Participants: 6 of 8, 3 known absences  Name of Group: Aftercare planning Date/Time: 10/17/2018 / [id number]; Group Participants: 5 of 8, 3 known absences      Description and Outcome:  Group therapy  Client reported he  had a good night.  He said the education session went well and his wife was responsive.  He said he had not told her about his suicidal thoughts and now she knows. He said they came up with a good support plan.  He said he continues to have poor sleep and nightmares.  Client was encouraged to check with MD about side effects of meds as nightmares can be the result of some meds.  Client to use nightmare protocol should that not be the case.  No SI reported. Client verbalized understanding of session content by problem solving with group members and accepting feedback.  Aftercare Planning  Client participated in an aftercare planning.  Client completed an exercise to assess needs, plan for skills to address the identified need, and create an action plan to follow through on starting back up on his hobby of [employer] building. Client worked with group members to get ideas and resources for assistance in identified areas.  Client demonstrated understanding of session content by creating a schedule for future use.    Is this a Weekly Review of the Progress on the Treatment Plan?  No

## 2018-10-18 NOTE — PROGRESS NOTES
"Adult Mental Health Outpatient Group Therapy Progress Note     Client Initial Individualized Goals for Treatment:       Initial Treatment suggestions for the client during the time between Diagnostic Assessment and completion of the Individualized Treatment Plan:  Follow Safety Plan   Ask for more information, support and/or assistance as needed.  Follow up with providers/community supports as needed:   Report increases or changes in symptoms to staff.  Report any personal safety concerns to staff.   Take medications as prescribed.  Report medication changes and/or side effects to staff.  Attend and participate in groups as scheduled or notify staff if unable to do so.  Report any use of substances to staff as this may impact your symptoms and/or perrsonal safety.  Notify staff if you have any other issues that need to be addressed. This may include any current abuse / neglect / exploitation or other vulnerability.  Follow recommendations of your treatment team and discuss concerns if not in agreement.      Area of Treatment Focus:  Symptom Management and Develop / Improve Independent Living Skills    Therapeutic Interventions/Treatment Strategies:  Support, Feedback, Safety Assessment, Structured Activity, Problem Solving and Education    Response to Treatment Strategies:  Accepted Feedback, Gave Feedback, Listened, Focused on Goals, Attentive, Accepted Support and Alert    Name of Group:  Group psychotherapy 9-10; Interpersonal Relationships 10-11    Group Participants:  5/6; 6/6    Description and Outcome:  Abisai participated in daily mindfulness practice. Says Says he is \"good\" today, despite many problems arising at home. The family cat is at emergency clinic at the [place], his brother-in-law is struggling with a personal crisis, another family member is having problems too. Says that he made a conscious decision that these things are not his ot worry about and he tried to stay mindfully present and this " helped. Gave positive feedback for making a conscious decision about how to address these events.     Discussed Ken Turner's Relationship Grid, with self-esteem and boundaries dimensions intersecting to describe 4 kinds of relating styles in four quadrants, implications for problems in relationships as well as implications for working with self and other to create a healthier way of relating that allows for close/open connectedness while maintaining healthy boundaries and valuing both people's needs and desires as important. Discussed examples of how these relationships function and options for improving functioning.     Is this a Weekly Review of the Progress on the Treatment Plan?  No

## 2018-10-18 NOTE — PROGRESS NOTES
Adult Mental Health Partial Hospitalization Group Therapy Progress Note     Date: 10/17/18    Client Initial Individualized Goals for Treatment: be able to concentrate so can do my job, fix the depression so I can be happier and normal, don't want to spend all my time either working or sleeping/want a normal life, want to enjoy hobbies agian     Treatment Goals:  1) Safety:     Client will notify staff when needing assistance to develop or implement a coping plan to manage suicidal or self injurious urges.     Client will use coping plan for safety, as needed.  2) Symptom Management:     Abisai will process triggers in a self compassionate way and learn how to treat himself with kindness.    Abisai will Learn ways to process reactions to unhelpful comments and reframe his thought processes.  3) In Life Skills Abisai will:    Learn, practice, apply 2 skills/strategies for improved lifestyle balance with an emphasis on leisure and setting healthy boundaries for his time and energy.    Learn, practice, generalize 2 sensory or mindfulness based self regulation skills for improved concentration in his meaningful life roles, routines, and relationships.  4) Discharge preparation: Will develop an aftercare / transition plan by discharge date     Area of Treatment Focus:  Symptom Management, Personal Safety and Develop / Improve Independent Living Skills    Therapeutic Interventions/Treatment Strategies:  Support, Feedback, Safety Assessments, Structured Activity, Problem Solving, Clarification and Education    Response to Treatment Strategies:  Accepted Feedback, Listened, Focused on Goals, Attentive, Accepted Support and Alert    Name of Group:  OT life skills clinic: mental health management  Time: 1:00-1:50  Group member attendance: 6 of 7     Description and Outcome: Abisai attended and participated in an structured life skills group where purposeful experiential intervention focuses on psychoeducation, exploration, practice, and  generalizing taught independent living skills. Through the use of supportive social interactions, structured therapeutic and functional tasks in context, group members work towards stabilizing and managing mental health symptoms for improved participation and function in valued roles, routines, relationships, and independent living.     Abisai independently engages in his chosen structured activity which he states is effective in helping him focus and calm down. He appreciates having the visual input and hands on activity to take him out of his head for a bit. We discussed how this type of engagement is activating his parasympathetic nervous system and quieting his sympathetic system and helps him gain insight into how that feels in his body, his thoughts, and his emotions.  Calm even affect. Abisai easily exchanges validation and support with is peers. He would benefit from additional opportunities to practice the content to be able to generalize it to his everyday life with increased intentionality, consistency, and efficacy in support of his psychiatric recovery.  He worked towards ITP goal number 3    Is this a Weekly Review of the Progress on the Treatment Plan?  Yes.      Are Treatment Plan Goals being addressed?  Yes, continue treatment goals      Are Treatment Plan Strategies to Address Goals Effective?  Yes, continue treatment strategies      Are there any current contracts in place?  No

## 2018-10-19 ENCOUNTER — HOSPITAL ENCOUNTER (OUTPATIENT)
Dept: BEHAVIORAL HEALTH | Facility: CLINIC | Age: 53
End: 2018-10-19
Attending: PSYCHIATRY & NEUROLOGY
Payer: COMMERCIAL

## 2018-10-19 PROCEDURE — H0035 MH PARTIAL HOSP TX UNDER 24H: HCPCS

## 2018-10-19 NOTE — PROGRESS NOTES
Adult Mental Health Outpatient Group Therapy Progress Note     Client Initial Individualized Goals for Treatment: be able to concentrate so can do my job, fix the depression so I can be happier and normal, don't want to spend all my time either working or sleeping/want a normal life, want to enjoy hobbies again        Treatment Goals:  1) Safety:     Client will notify staff when needing assistance to develop or implement a coping plan to manage suicidal or self injurious urges.     Client will use coping plan for safety, as needed.  2) Symptom Management:     Abisai will process triggers in a self compassionate way and learn how to treat himself with kindness.    Abisai will Learn ways to process reactions to unhelpful comments and reframe his thought processes.  3) In Life Skills Abisai will:    Learn, practice, apply 2 skills/strategies for improved lifestyle balance with an emphasis on leisure and setting healthy boundaries for his time and energy.    Learn, practice, generalize 2 sensory or mindfulness based self regulation skills for improved concentration in his meaningful life roles, routines, and relationships.  4) Discharge preparation: Will develop an aftercare / transition plan by discharge date     Area of Treatment Focus:  Symptom Management, Personal Safety, Develop / Improve Independent Living Skills    Therapeutic Interventions/Treatment Strategies:  Support, Feedback, Safety Assessments, Structured Activity, Problem Solving and Education    Response to Treatment Strategies:  Accepted Feedback, Gave Feedback, Listened, Focused on Goals, Attentive, Accepted Support and Alert     Name of Group: Group Therapy Date/Time: 10/19/2018 / 0900 to 0950; Group Participants: 6 if 7, 1 absence due to inpatient hospitalization Name of Group: Self-Awareness Date/Time: 10/19/2018 / [id number]; Group Participants: 6 if 7, 1 absence due to inpatient hospitalization    Description and Outcome:  Group therapy  Client reported  continued stress. He said he was back at the vet due to his cat's illness, he is having problems with his short-term disability paperwork, and he is not sleeping well.  Group validated his concern over his cat and  helped problem solved how to manage STD paperwork.  Discussed problems with sleep, mainly nightmares, and how to take the power away from them as well as talking to MD about options for managing with medication.  No SI reported  Client verbalized understanding of session content by problem solving with group members and accepting feedback.  Self-Awareness  Client participated in an education session on guilt and shame.  Guilt and shame were defined and examples were discussed.  The process for managing guilt and shame were presented and ways to challenge thinking to evaluate guilt and make decisions based on current behavior and needs.  Discussed managing reactions of others and creating avenues for change.  Client would benefit from additional opportunities to practice and implement content from this session.    Is this a Weekly Review of the Progress on the Treatment Plan?  Yes.       Are Treatment Plan Goals being addressed?  Yes, continue treatment goals        Are Treatment Plan Strategies to Address Goals Effective?  Yes, continue treatment strategies        Are there any current contracts in place?  No

## 2018-10-19 NOTE — PROGRESS NOTES
Adult Mental Health Outpatient Group Therapy Progress Note     Client Initial Individualized Goals for Treatment: be able to concentrate so can do my job, fix the depression so I can be happier and normal, don't want to spend all my time either working or sleeping/want a normal life, want to enjoy hobbies agian      Treatment Goals:  1) Safety:     Client will notify staff when needing assistance to develop or implement a coping plan to manage suicidal or self injurious urges.     Client will use coping plan for safety, as needed.  2) Symptom Management:     Abisai will process triggers in a self compassionate way and learn how to treat himself with kindness.    Abisai will Learn ways to process reactions to unhelpful comments and reframe his thought processes.  3) In Life Skills Abisai will:    Learn, practice, apply 2 skills/strategies for improved lifestyle balance with an emphasis on leisure and setting healthy boundaries for his time and energy.    Learn, practice, generalize 2 sensory or mindfulness based self regulation skills for improved concentration in his meaningful life roles, routines, and relationships.  4) Discharge preparation: Will develop an aftercare / transition plan by discharge date        Area of Treatment Focus:  Symptom Management and Develop Socialization / Interpersonal Relationship Skills    Therapeutic Interventions/Treatment Strategies:  Support, Feedback, Structured Activity, Clarification and Education    Response to Treatment Strategies:  Accepted Feedback, Gave Feedback, Listened, Focused on Goals, Attentive, Accepted Support and Alert    Name of Group:  Self awareness 200-250, 6 of 6 participants     Description and Outcome:  The group was provided with a guided breathing and warmup structure with focus on increasing self awareness and on providing an embodied experience.  Discussion included the importance of listening to body cues as a way of identifying emotion as well as accompanying  "needs and wants, and as a way of practising self compassion, authenticity, mindfulness, self expression,  and connection to other.  Group members were introduced to Arely movement analysis as a tool to \"move with intention\" and thereby practising mindfulness.  Client demonstrated understanding of session by participating in experiential and sharing experience with other.  Abisai was engaged.    Is this a Weekly Review of the Progress on the Treatment Plan?  No        "

## 2018-10-19 NOTE — DISCHARGE SUMMARY
Adult Mental Health Intensive Outpatient Discharge Summary/Instructions      Patient: Dash Arizmendi MRN: [id number]   : 1965 Age: 53 year old Sex: male     Admission Date: 10/11/18  Discharge Date: 10/24/18    Diagnosis: Bipolar I disorder, depressed. Panic disorder without agoraphobia. Generalized anxiety disorder.     Focus of Treatment / Progress    Personal Safety: history of thoughts about suicide   * Follow your safety plan     * Call crisis lines as needed:    [facility] [phone] [facility] [phone]  [facility] [phone] Crisis Connection [phone]  [facility] [phone] [facility] [phone]  [facility] [phone] National Suicide Prevention [phone]  [facility] [phone] Suicide Prevention [phone]  [facility] [phone]    Managing symptoms of:  depressed mood, feeling of hopelessness, feeling of worthlessness, irritability, low interest in usual activities, thinking it would be better to die, constant worry, poor concentration, trouble remembering things, excessive sleep that does not feel restful, low motivation, low energy, having to check/recheck what has already been done, hearing muffled sounds or a bell (knows that it is not real), nightmares    Community support/health:  SANCHEZ MONTOYAA    Managing Symptoms and Preventing Relapse    * Go to all of your appointments    * Take all medications as directed.      * Carry a current list if medication with you    * Do not use illicit (street) drugs.  Avoid alcohol    * Report these symptoms to your care team. These are early signs of relapse:   Thoughts of suicide   Losing more sleep   Increased confusion   Mood getting worse   Feeling more aggressive   Other:      *Use these skills daily:  Mindfulness, practice self-compassion, practice authenticity in making choices, maintain balance in schedule (time for self and others, time for relaxation and time for activities, time  for leisure and time for tasks, time at home and time out of the house), assert needs and set limits with others as needed, practice intentional physical relaxation, challenge negative thoughts/use affirming and encouraging self-talk, engage with support people on regular basis, practice good self-care (sleep hygiene, balanced eating, regular rest, take care of medical needs, maintain personal hygiene, self-nurturing activities), acknowledge and accept your feelings    Copy of summary sent to:     Follow up with psychiatrist / main caregiver: Dr. Suarez, [facility]    Next visit: November 2018    Follow up with your therapist: brittany (has made initial appointments with several to figure who he wants to see)  Next visit: brittany    Go to group therapy and / or support groups at: [facility] M, T, Th 9-12, starting Monday October 29.     See your medical doctor about:      Other:  We appreciate the opportunity to work with you in the past few weeks and wish you the best as you move forward. We encourage you to continue attending to all of your health needs.    Client Signature:_______________________  Date / Time:___________  Staff Signature:________________________   Date / Time:___________

## 2018-10-19 NOTE — PROGRESS NOTES
[facility]   Dr. Bajwa's Psychiatric Progress Note  10/19/2018      Patient:  Dash Arizmendi   Medical Record Number:  [id number]  :  1965          Interim History:   The patient's care was discussed with the treatment team and chart notes were reviewed.  Dreamed about puppets and marionettes ravaging the city killing people.  Crazy dreams are new x 1.5 weeks.  He's had these in the past.  Dreams never seemed to be related to the dreams.   Groups are going well.  He'll finish up group next Wed.  He'll see his sister in law in dance piece this weekend.      Psychiatric ROS:  Mood:    More depressed; worse; Anxious;    Sleep:  Having a lot of nightmares;  Frequent vivid dreams;   Appetite:   less  Eating:   Eats when wife nags him about eating;   Energy Level:  Ok   Concentration/Memory Problems:  NO  Suicidal Thoughts:No  Homicidal Thoughts:No  Psychotic Symptoms: No  Medication Side Effects:Yes takes longer to ejaculate and it just dribbles;    Medication Compliance:Yes   Physical Complaints: headaches         Medications:     PAST PSYCH MEDS:  Prozac, Zoloft, Topamax, Abilify (caused insomnia), Risperdal (caused dizziness), Xanax, Vistaril, Klonopin, propranolol and Lamictal    Current Outpatient Prescriptions   Medication Sig     lamoTRIgine (LAMICTAL) 25 MG tablet Take 1 po daily x 1 week then increase every week by 25mg until reaching target dose of 200 mg/d           SERTRALINE HCL PO Take 100 mg by mouth 2 times daily     Topiramate (TOPAMAX PO) Take 100 mg by mouth     No current facility-administered medications for this encounter.              Allergies:   No Known Allergies         Psychiatric Examination:   There were no vitals taken for this visit.  Weight is 0 lbs 0 oz  There is no height or weight on file to calculate BMI.    Appearance:  awake, alert and adequately groomed  Attitude:  cooperative  Eye Contact:  good  Mood:  depressed, anxious  Affect:  mood  congruent  Speech:  clear, coherent  Psychomotor Behavior:  no evidence of tardive dyskinesia, dystonia, or tics  Throught Process:  logical  Associations:  no loose associations  Thought Content:  no evidence of suicidal ideation or homicidal ideation and no evidence of psychotic thought  Insight:  good  Judgement:  intact  Oriented to:  time, person, and place  Attention Span and Concentration:  intact  Recent and Remote Memory:  intact  Gait:Normal    Risk/Potential for Dangerousness:  Multiple Active Diagnoses:HIGH  Self Care:HIGH  Suicide:LOW  Assault:LOW  Self Injurious Behaviors:LOW  Inappropriate Sexual Behavior:LOW         Labs:   No results found for this or any previous visit (from the past 24 hour(s)).     No results found for this or any previous visit (from the past 1008 hour(s)).      Impression:   This is a 53 year old male continues PHP for mood stabilization.  Mood is still depressed and anxious.  He finishes group next Wed.           DIagnoses:     Axis I:  Bipolar I disorder, depressed. Panic disorder without agoraphobia. Generalized anxiety disorder.   Axis II: No diagnosis.   Axis III:  Obesity. Asthma.            Plan:     Continue [facility] Partial Hospital Program.  End group 10/24/18.  He'll return to work 10/25/18.    Started Lamictal as mood stabilizer and to target his bipolar depression.  He'll increase dose to 50mg on 10/24/18.    Hold Zoloft in AM days wants to have sexual activity and then take in the evening afterwards.  It that doesn't work, may try Wellbutrin.    Expect stabilization and completion of Partial Hospital Program.   Follow up with his psychiatrist at [facility], Dr. Marni Suarez in late Nov. 2018.      Eligio Bajwa MD

## 2018-10-19 NOTE — PROGRESS NOTES
Adult Mental Health Outpatient Group Therapy Progress Note     Client Initial Individualized Goals for Treatment: be able to concentrate so can do my job, fix the depression so I can be happier and normal, don't want to spend all my time either working or sleeping/want a normal life, want to enjoy hobbies agian      Treatment Goals:  1) Safety:     Client will notify staff when needing assistance to develop or implement a coping plan to manage suicidal or self injurious urges.     Client will use coping plan for safety, as needed.  2) Symptom Management:     Abisai will process triggers in a self compassionate way and learn how to treat himself with kindness.    Abisai will Learn ways to process reactions to unhelpful comments and reframe his thought processes.  3) In Life Skills Abisai will:    Learn, practice, apply 2 skills/strategies for improved lifestyle balance with an emphasis on leisure and setting healthy boundaries for his time and energy.    Learn, practice, generalize 2 sensory or mindfulness based self regulation skills for improved concentration in his meaningful life roles, routines, and relationships.  4) Discharge preparation: Will develop an aftercare / transition plan by discharge date        Area of Treatment Focus:  Symptom Management and Develop Socialization / Interpersonal Relationship Skills    Therapeutic Interventions/Treatment Strategies:  Support, Feedback, Structured Activity and Clarification    Response to Treatment Strategies:  Accepted Feedback, Listened, Focused on Goals, Attentive and Alert    Name of Group:  Relax and review, 200-250, 5 of 6 participants     Description and Outcome:  Discussed importance of intentional physical relaxation practice for management of anxiety and as a way to reduce stress to help with depression.  The group was offered a guided imagery exercise and were also invited to share an accomplishment from this week.  Client demonstrated understanding of session  by participating in relaxation exercise and sharing accomplishment with the group.  Abisai shared that he was able to relax during the session and that he has been 10 days sober.    Is this a Weekly Review of the Progress on the Treatment Plan?  No

## 2018-10-22 ENCOUNTER — HOSPITAL ENCOUNTER (OUTPATIENT)
Dept: BEHAVIORAL HEALTH | Facility: CLINIC | Age: 53
End: 2018-10-22
Attending: PSYCHIATRY & NEUROLOGY
Payer: COMMERCIAL

## 2018-10-22 PROCEDURE — H0035 MH PARTIAL HOSP TX UNDER 24H: HCPCS

## 2018-10-22 NOTE — PROGRESS NOTES
[facility]   Dr. Bajwa's Psychiatric Progress Note  10/22/2018      Patient:  Dash Arizmendi   Medical Record Number:  [id number]  :  1965          Interim History:   The patient's care was discussed with the treatment team and chart notes were reviewed.  Weekend was blah.  Has a cat with a UTI, and they were back and forth to the  [facility] with that.  Cat is 15 y/o.      Psychiatric ROS:  Mood:    Depression is the same;  Still anxious;    Sleep:  Poor as up with sick cat all weekend;    Appetite:   less  Eating:   Eats when wife nags him about eating;   Energy Level:  Ok   Concentration/Memory Problems:  NO  Suicidal Thoughts:No  Homicidal Thoughts:No  Psychotic Symptoms: No  Medication Side Effects:Yes takes longer to ejaculate and it just dribbles;    Medication Compliance:Yes   Physical Complaints:   none         Medications:     PAST PSYCH MEDS:  Prozac, Zoloft, Topamax, Abilify (caused insomnia), Risperdal (caused dizziness), Xanax, Vistaril, Klonopin, propranolol and Lamictal    Current Outpatient Prescriptions   Medication Sig     lamoTRIgine (LAMICTAL) 25 MG tablet Take 1 po daily x 1 week then increase every week by 25mg until reaching target dose of 200 mg/d (increases every Wed.)           SERTRALINE HCL PO Take 100 mg by mouth 2 times daily     Topiramate (TOPAMAX PO) Take 100 mg by mouth     No current facility-administered medications for this encounter.              Allergies:   No Known Allergies         Psychiatric Examination:   There were no vitals taken for this visit.  Weight is 0 lbs 0 oz  There is no height or weight on file to calculate BMI.    Appearance:  awake, alert and adequately groomed  Attitude:  cooperative  Eye Contact:  good  Mood:  depressed, anxious  Affect:  mood congruent  Speech:  clear, coherent  Psychomotor Behavior:  no evidence of tardive dyskinesia, dystonia, or tics  Throught Process:  logical  Associations:  no  loose associations  Thought Content:  no evidence of suicidal ideation or homicidal ideation and no evidence of psychotic thought  Insight:  good  Judgement:  intact  Oriented to:  time, person, and place  Attention Span and Concentration:  intact  Recent and Remote Memory:  intact  Gait:Normal    Risk/Potential for Dangerousness:  Multiple Active Diagnoses:HIGH  Self Care:HIGH  Suicide:LOW  Assault:LOW  Self Injurious Behaviors:LOW  Inappropriate Sexual Behavior:LOW         Labs:   No results found for this or any previous visit (from the past 24 hour(s)).     No results found for this or any previous visit (from the past 1008 hour(s)).      Impression:   This is a 53 year old male continues PHP for mood stabilization.  Mood is still depressed and anxious.  He finishes group this Wed.           DIagnoses:     Axis I:  Bipolar I disorder, depressed. Panic disorder without agoraphobia. Generalized anxiety disorder.   Axis II: No diagnosis.   Axis III:  Obesity. Asthma.            Plan:     Continue [facility] Hospital Program.  Ends group 10/24/18.  He'll return to work 10/25/18.    Started Lamictal as mood stabilizer and to target his bipolar depression.  He'll increase dose to 50mg on 10/24/18.    Hold Zoloft in AM days wants to have sexual activity and then take in the evening afterwards.  It that doesn't work, may try Wellbutrin.    Expect stabilization and completion of Partial Hospital Program.   Follow up with his psychiatrist at [facility], Dr. Marni Suarez in late Nov. 2018.      Eligio Bajwa MD

## 2018-10-22 NOTE — PROGRESS NOTES
Adult Mental Health Outpatient Group Therapy Progress Note     Client Initial Individualized Goals for Treatment: be able to concentrate so can do my job, fix the depression so I can be happier and normal, don't want to spend all my time either working or sleeping/want a normal life, want to enjoy hobbies again        Treatment Goals:  1) Safety:     Client will notify staff when needing assistance to develop or implement a coping plan to manage suicidal or self injurious urges.     Client will use coping plan for safety, as needed.  2) Symptom Management:     Abisai will process triggers in a self compassionate way and learn how to treat himself with kindness.    Abisai will Learn ways to process reactions to unhelpful comments and reframe his thought processes.  3) In Life Skills Abisai will:    Learn, practice, apply 2 skills/strategies for improved lifestyle balance with an emphasis on leisure and setting healthy boundaries for his time and energy.    Learn, practice, generalize 2 sensory or mindfulness based self regulation skills for improved concentration in his meaningful life roles, routines, and relationships.  4) Discharge preparation: Will develop an aftercare / transition plan by discharge date         Area of Treatment Focus:  Symptom Management, Personal Safety, Develop / Improve Independent Living Skills    Therapeutic Interventions/Treatment Strategies:  Support, Feedback, Safety Assessments, Structured Activity, Problem Solving and Education    Response to Treatment Strategies:  Accepted Feedback, Gave Feedback, Listened, Focused on Goals, Attentive, Accepted Support and Alert     Name of Group: Group Therapy Date/Time: 10/22/2018 / 0900 to 0950; Group Participants: 6 of 6  Name of Group: Anxiety Lab Date/Time: 10/22/2018 / [id number]; Group Participants: 6 of 6  Name of Group: Self-talk  Date/Time: 10/22/2018 / [id number]; Group Participants:6 of 6      Description and Outcome:  Group therapy  Client  reported feeling exhausted.  He said his cat continued to have problems over the weekend and needed to be watched all weekend so he did not get much sleep.  He said his cat seems to be over the worst of the crisis so he believes he will sleep tonight.  Group members provided support.  No SI reported.  Client verbalized understanding of session content by problem solving with group members and accepting feedback.  Anxiety Lab  Client participated in an education group on managing physical symptoms of anxiety.  Brain and body system communication was presented and explained in the context of panic.  Skills to regulate body systems and manage emotions and thinking were presented and discussed in group.  Client worked with group members to better understand the use of self-soothing skills to decrease the intensity of anxiety symptoms. Client demonstrated understanding of session content by asking for clarification of information, generating skills, and working examples with others.  Self-Talk  Client participated in an education session on cognitive distortions.  The effect of emotion on thoughts was presented and the benefits of calming before acting on thoughts was discussed.  Attribution theory was presented as a mechanism by which distortions can be generated.  Discussed learning theory related to associative networks and reinforcement for constriction in thinking.  Types of distortions were presented and the client identified common distortions he uses to explain events and predict outcomes.  Client worked on an example of all/nothing thinking with the group to generate ways to think more flexibly and decrease symptoms distress. Client would benefit from additional opportunities to practice and implement content from this session.  Is this a Weekly Review of the Progress on the Treatment Plan?  No

## 2018-10-23 ENCOUNTER — HOSPITAL ENCOUNTER (OUTPATIENT)
Dept: BEHAVIORAL HEALTH | Facility: CLINIC | Age: 53
End: 2018-10-23
Attending: PSYCHIATRY & NEUROLOGY
Payer: COMMERCIAL

## 2018-10-23 PROCEDURE — H0035 MH PARTIAL HOSP TX UNDER 24H: HCPCS

## 2018-10-23 NOTE — PROGRESS NOTES
Adult Mental Health Outpatient Group Therapy Progress Note     Client Initial Individualized Goals for Treatment: be able to concentrate so can do my job, fix the depression so I can be happier and normal, don't want to spend all my time either working or sleeping/want a normal life, want to enjoy hobbies again        Treatment Goals:  1) Safety:     Client will notify staff when needing assistance to develop or implement a coping plan to manage suicidal or self injurious urges.     Client will use coping plan for safety, as needed.  2) Symptom Management:     Abisai will process triggers in a self compassionate way and learn how to treat himself with kindness.    Abisai will Learn ways to process reactions to unhelpful comments and reframe his thought processes.  3) In Life Skills Abisai will:    Learn, practice, apply 2 skills/strategies for improved lifestyle balance with an emphasis on leisure and setting healthy boundaries for his time and energy.    Learn, practice, generalize 2 sensory or mindfulness based self regulation skills for improved concentration in his meaningful life roles, routines, and relationships.  4) Discharge preparation: Will develop an aftercare / transition plan by discharge date       Area of Treatment Focus:  Symptom Management, Personal Safety, Develop / Improve Independent Living Skills    Therapeutic Interventions/Treatment Strategies:  Support, Feedback, Safety Assessments, Structured Activity, Problem Solving and Education    Response to Treatment Strategies:  Accepted Feedback, Gave Feedback, Listened, Focused on Goals, Attentive, Accepted Support and Alert     Name of Group: Group Therapy Date/Time: 10/23/2018 / 0900 to 0950; Group Participants: 6 of 6  Name of Group: Discharge Planning Date/Time: 10/23/2018 / [id number]; Group Participants: 6 of 6  Name of Group: Mindfulness  Date/Time: 10/23/2018 / [id number]; Group Participants: 5 of 6, one absence due to appt.    Description and  Outcome:  Group therapy  Client reported having a difficult night and continued poor sleep.  He said he had an argument with his wife.  He said it is rare for them to argue and thinks it was due to them both being tired from taking care of the cat.  He said the argument was about his wife wanting him to disclose his mental illness to her brother because his wife believes that would help her brother.  He said he does not want to tell anyone about what he is going through but said she could tell her brother and that did not work for her.  Discussed observing his limits and communicating that to his wife while they are calm.  No change in SI.  Client verbalized understanding of session content by problem solving with group members and accepting feedback.  Aftercare planning  Client participated in an aftercare planning.  Client completed an exercise to assess needs, plan for skills to address the identified need, and create an action plan to follow through on going to a tech meetup. Client worked with group members to get ideas and resources for assistance in identified areas.  Client demonstrated understanding of session content by creating a schedule for future use.  Mindfulness  Client participated in an exercise on mindfulness.  The goals of mindfulness, what skills and how skills were presented and discussed related to emotions management of depression and anxiety.  Examples were presented and client practiced skills learned with group members.   Client provided feedback to the group.  Client would benefit from additional opportunities to practice and implement content from this session.      Is this a Weekly Review of the Progress on the Treatment Plan?  No

## 2018-10-23 NOTE — PROGRESS NOTES
Adult Mental Health Outpatient Group Therapy Progress Note     Client Initial Individualized Goals for Treatment: be able to concentrate so can do my job, fix the depression so I can be happier and normal, don't want to spend all my time either working or sleeping/want a normal life, want to enjoy hobbies agian      Treatment Goals:  1) Safety:     Client will notify staff when needing assistance to develop or implement a coping plan to manage suicidal or self injurious urges.     Client will use coping plan for safety, as needed.  2) Symptom Management:     Abisai will process triggers in a self compassionate way and learn how to treat himself with kindness.    Abisai will Learn ways to process reactions to unhelpful comments and reframe his thought processes.  3) In Life Skills Abisai will:    Learn, practice, apply 2 skills/strategies for improved lifestyle balance with an emphasis on leisure and setting healthy boundaries for his time and energy.    Learn, practice, generalize 2 sensory or mindfulness based self regulation skills for improved concentration in his meaningful life roles, routines, and relationships.  4) Discharge preparation: Will develop an aftercare / transition plan by discharge date          Area of Treatment Focus:  Symptom Management, Develop Socialization / Interpersonal Relationship Skills and Cultural / Spirituality    Therapeutic Interventions/Treatment Strategies:  Support, Feedback, Clarification and Education    Response to Treatment Strategies:  Accepted Feedback, Gave Feedback, Listened, Focused on Goals, Attentive, Accepted Support and Alert    Name of Group:  Loss and Grief, [id number], 6 of 6 participants      Description and Outcome:  Group was lead by chaplain Candelario. Poems and discussion were used to facilitate the conversation of grief and loss. The group was encouraged to share and process their feelings of loss with a grief diagram. Themes discussed: recognizing grief as a feeling,  accepting grief as our own, trusting grief to lead us to hope and remembering to show compassion to ourselves when we face grief. Abisai demonstrated understanding of session by sharing his observation of the importance of naming grief, shift from big G to little g.    Is this a Weekly Review of the Progress on the Treatment Plan?  No

## 2018-10-24 ENCOUNTER — HOSPITAL ENCOUNTER (OUTPATIENT)
Dept: BEHAVIORAL HEALTH | Facility: CLINIC | Age: 53
End: 2018-10-24
Attending: PSYCHIATRY & NEUROLOGY
Payer: COMMERCIAL

## 2018-10-24 PROCEDURE — H0035 MH PARTIAL HOSP TX UNDER 24H: HCPCS

## 2018-10-24 NOTE — PROGRESS NOTES
Adult Mental Health Partial Hospitalization Group Therapy Progress Note     Date: 10/22/18    Client Initial Individualized Goals for Treatment: be able to concentrate so can do my job, fix the depression so I can be happier and normal, don't want to spend all my time either working or sleeping/want a normal life, want to enjoy hobbies agian     Treatment Goals:  1) Safety:     Client will notify staff when needing assistance to develop or implement a coping plan to manage suicidal or self injurious urges.     Client will use coping plan for safety, as needed.  2) Symptom Management:     Abisai will process triggers in a self compassionate way and learn how to treat himself with kindness.    Abisai will Learn ways to process reactions to unhelpful comments and reframe his thought processes.  3) In Life Skills Abisai will:    Learn, practice, apply 2 skills/strategies for improved lifestyle balance with an emphasis on leisure and setting healthy boundaries for his time and energy.    Learn, practice, generalize 2 sensory or mindfulness based self regulation skills for improved concentration in his meaningful life roles, routines, and relationships.  4) Discharge preparation: Will develop an aftercare / transition plan by discharge date     Area of Treatment Focus:  Symptom Management, Personal Safety and Develop / Improve Independent Living Skills    Therapeutic Interventions/Treatment Strategies:  Support, Feedback, Safety Assessments, Structured Activity, Problem Solving, Clarification and Education    Response to Treatment Strategies:  Accepted Feedback, Listened, Focused on Goals, Attentive, Accepted Support and Alert    Name of Group:  OT life skills: Goal integration  Time: 1:00-1:50  Group member attendance: 6 of 6    Description and Outcome:  Abisai attended and participated in a structured life skills group where purposeful experiential intervention focuses on psychoeducation, exploration, practice, and generalizing  taught independent living skills. Through the use of supportive social interactions, structured therapeutic and functional tasks in context, group members work towards stabilizing and managing mental health symptoms for improved participation and function in valued roles, routines, relationships, and independent living.     To support progress towards treatment goals and psychiatric recovery, group members were led through a structured process to integrate new learning, skills, and emerging self-awareness into their daily and weekly life. The process includes:   1. Brief psychoeducation on evidence around the neuro-science of change with regards to setting small achievable goals.  2. Brief education on components of self-compassion in context of setting goals provided.  3. Brief psychoeducation and practice of 2  skills: GLAD and 3 good things to support positive thought processes.  4. Write one smart goals for the next week in 3 of the following areas: self-compassion, mindfulness, connections, wellness, lifestyle balance, discharge planning, and coping skills.   5. Integrate the goals into their life using a provided weekly planner. Encouraged to highlight the highlights as the week progresses and connect their moods to what activity they were doing to increase self awareness.   6. Sharing intentions with the group for accountability, and to also receive support from peers as the week progresses.  Abisai presents with even calm affect today. He was able to set the following 3 SMART goals for the week in support of his recovery:   1) Mindfulenss: Practice paired muscle relation 3x after dinner  2) Wellness: Eat supper with family each day  3) Coping skills: Intentionally practice paced breathing daily.     Validation and support provided. Abisai would benefit from additional opportunities to practice the content to be able to generalize it to his everyday life with increased intentionality, consistency, and efficacy in  support of his psychiatric recovery. He worked towards ITP goal number 3    Is this a Weekly Review of the Progress on the Treatment Plan?  No.

## 2018-10-24 NOTE — PROGRESS NOTES
Adult Mental Health Partial Hospitalization Group Therapy Progress Note     Date: 10/19/18    Client Initial Individualized Goals for Treatment: be able to concentrate so can do my job, fix the depression so I can be happier and normal, don't want to spend all my time either working or sleeping/want a normal life, want to enjoy hobbies agian     Treatment Goals:  1) Safety:     Client will notify staff when needing assistance to develop or implement a coping plan to manage suicidal or self injurious urges.     Client will use coping plan for safety, as needed.  2) Symptom Management:     Abisai will process triggers in a self compassionate way and learn how to treat himself with kindness.    Abisai will Learn ways to process reactions to unhelpful comments and reframe his thought processes.  3) In Life Skills Abisai will:    Learn, practice, apply 2 skills/strategies for improved lifestyle balance with an emphasis on leisure and setting healthy boundaries for his time and energy.    Learn, practice, generalize 2 sensory or mindfulness based self regulation skills for improved concentration in his meaningful life roles, routines, and relationships.  4) Discharge preparation: Will develop an aftercare / transition plan by discharge date     Area of Treatment Focus:  Symptom Management, Personal Safety and Develop / Improve Independent Living Skills    Therapeutic Interventions/Treatment Strategies:  Support, Feedback, Safety Assessments, Structured Activity, Problem Solving, Clarification and Education    Response to Treatment Strategies:  Accepted Feedback, Listened, Focused on Goals, Attentive, Accepted Support and Alert    Name of Group:  OT life skills clinic: stress management  Time: 1:00-1:50  Group member attendance: 6 of 6    Description and Outcome: Abisai attended and participated in an structured life skills group where purposeful experiential intervention focuses on psychoeducation, exploration, practice, and  generalizing taught independent living skills. Through the use of supportive social interactions, structured therapeutic and functional tasks in context, group members work towards stabilizing and managing mental health symptoms for improved participation and function in valued roles, routines, relationships, and independent living.     Provided group with verbal and written psychoeducation material focused on lifestyle balance skills that support wellness and reduce stress. Abisai continues to struggle with situational stressors and was encouraged to engage in the process of what he would want his life balance to look like based on what he has learned about the process of his psychiatric recovery.  Abisai is engaged and receives validation and support from his peers. He was able to identify the area of time to develop an interest of his something is missing. He would benefit from additional opportunities to practice the content to be able to generalize it to his everyday life with increased intentionality, consistency, and efficacy in support of his psychiatric recovery.  He worked towards ITP goal number 3    Is this a Weekly Review of the Progress on the Treatment Plan?  Yes.      Are Treatment Plan Goals being addressed?  Yes, continue treatment goals      Are Treatment Plan Strategies to Address Goals Effective?  Yes, continue treatment strategies      Are there any current contracts in place?  No

## 2018-10-24 NOTE — PROGRESS NOTES
Adult Mental Health Outpatient Group Therapy Progress Note     Client Initial Individualized Goals for Treatment: be able to concentrate so can do my job, fix the depression so I can be happier and normal, don't want to spend all my time either working or sleeping/want a normal life, want to enjoy hobbies agian      Treatment Goals:  1) Safety:     Client will notify staff when needing assistance to develop or implement a coping plan to manage suicidal or self injurious urges.     Client will use coping plan for safety, as needed.  2) Symptom Management:     Abisai will process triggers in a self compassionate way and learn how to treat himself with kindness.    Abisai will Learn ways to process reactions to unhelpful comments and reframe his thought processes.  3) In Life Skills Abisai will:    Learn, practice, apply 2 skills/strategies for improved lifestyle balance with an emphasis on leisure and setting healthy boundaries for his time and energy.    Learn, practice, generalize 2 sensory or mindfulness based self regulation skills for improved concentration in his meaningful life roles, routines, and relationships.  4) Discharge preparation: Will develop an aftercare / transition plan by discharge date          Area of Treatment Focus:  Symptom Management and Develop Socialization / Interpersonal Relationship Skills    Therapeutic Interventions/Treatment Strategies:  Support, Feedback, Structured Activity, Clarification and Education    Response to Treatment Strategies:  Accepted Feedback, Gave Feedback, Listened, Focused on Goals, Attentive, Accepted Support and Alert    Name of Group:   Mental  Health management [id number], 4 of 5 participants      Description and Outcome:  The group was given a structured journaling worksheet with the focus on feeling identification, expression and containment.  Information on the purpose and benefits of structured journaling was discussed.  Group members were offered the opportunity to  "share part, all, or none of their journaling and were encouraged to comment on process. Abisai demonstrated understanding of session by completing worksheet.  He chose to work with feeling of \"desperate\" and stated worksheet was difficult as it made it hard for him to ignore this feeling.    Is this a Weekly Review of the Progress on the Treatment Plan?  No        "

## 2018-10-24 NOTE — PROGRESS NOTES
[facility]   Dr. Bajwa's Psychiatric Progress Note  10/24/2018      Patient:  Dash Arizmendi   Medical Record Number:  [id number]  :  1965          Interim History:   The patient's care was discussed with the treatment team and chart notes were reviewed.  Mood is better.  More sleep.  More routine.  Cat is better, still on antibiotics.      Psychiatric ROS:  Mood:   Better;  Less depressed;  Anxiety is managable.     Sleep: better  Appetite:  Below normal;    Eating:  Eating ok  Energy Level:  Ok   Concentration/Memory Problems:  no  Suicidal Thoughts:No  Homicidal Thoughts:No  Psychotic Symptoms: No  Medication Side Effects:Yes takes longer to ejaculate and it just dribbles;    Medication Compliance:Yes   Physical Complaints:   none         Medications:     PAST PSYCH MEDS:  Prozac, Zoloft, Topamax, Abilify (caused insomnia), Risperdal (caused dizziness), Xanax, Vistaril, Klonopin, propranolol and Lamictal    Current Outpatient Prescriptions   Medication Sig     lamoTRIgine (LAMICTAL) 25 MG tablet Take 2 po daily x 1 week then increase every week by 25mg until reaching target dose of 200 mg/d (increases every Wed.)           SERTRALINE HCL PO Take 100 mg by mouth 2 times daily     Topiramate (TOPAMAX PO) Take 100 mg by mouth     No current facility-administered medications for this encounter.              Allergies:   No Known Allergies         Psychiatric Examination:   There were no vitals taken for this visit.  Weight is 0 lbs 0 oz  There is no height or weight on file to calculate BMI.    Appearance:  awake, alert and adequately groomed  Attitude:  cooperative  Eye Contact:  good  Mood:  improving  Affect:  brighter  Speech:  clear, coherent  Psychomotor Behavior:  no evidence of tardive dyskinesia, dystonia, or tics  Throught Process:  logical  Associations:  no loose associations  Thought Content:  no evidence of suicidal ideation or homicidal ideation and no  evidence of psychotic thought  Insight:  good  Judgement:  intact  Oriented to:  time, person, and place  Attention Span and Concentration:  intact  Recent and Remote Memory:  intact  Gait:Normal    Risk/Potential for Dangerousness:  Multiple Active Diagnoses:HIGH  Self Care:HIGH  Suicide:LOW  Assault:LOW  Self Injurious Behaviors:LOW  Inappropriate Sexual Behavior:LOW         Labs:   No results found for this or any previous visit (from the past 24 hour(s)).     No results found for this or any previous visit (from the past 1008 hour(s)).      Impression:   This is a 53 year old male continues PHP for mood stabilization.  Mood is improving.           DIagnoses:     Axis I:  Bipolar I disorder, depressed. Panic disorder without agoraphobia. Generalized anxiety disorder.   Axis II: No diagnosis.   Axis III:  Obesity. Asthma.            Plan:     Complete [facility] today.  He'll return to work 10/25/18 unless he's going to attend Day Treatment.  He's still looking at that.      Started Lamictal as mood stabilizer and to target his bipolar depression.  He increased dose to 50mg on 10/24/18.    Hold Zoloft in AM days wants to have sexual activity and then take in the evening afterwards.  It that doesn't work, may try Wellbutrin.    Follow up with his psychiatrist at [facility], Dr. Marni Suarez in late Nov. 2018.      Eligio Bajwa MD

## 2018-10-25 ENCOUNTER — HOSPITAL ENCOUNTER (OUTPATIENT)
Dept: BEHAVIORAL HEALTH | Facility: CLINIC | Age: 53
End: 2018-10-25
Attending: PSYCHIATRY & NEUROLOGY
Payer: COMMERCIAL

## 2018-10-25 PROCEDURE — H0035 MH PARTIAL HOSP TX UNDER 24H: HCPCS

## 2018-10-25 ASSESSMENT — ANXIETY QUESTIONNAIRES
7. FEELING AFRAID AS IF SOMETHING AWFUL MIGHT HAPPEN: MORE THAN HALF THE DAYS
2. NOT BEING ABLE TO STOP OR CONTROL WORRYING: SEVERAL DAYS
6. BECOMING EASILY ANNOYED OR IRRITABLE: NOT AT ALL
3. WORRYING TOO MUCH ABOUT DIFFERENT THINGS: SEVERAL DAYS
GAD7 TOTAL SCORE: 4
5. BEING SO RESTLESS THAT IT IS HARD TO SIT STILL: NOT AT ALL
1. FEELING NERVOUS, ANXIOUS, OR ON EDGE: NOT AT ALL
IF YOU CHECKED OFF ANY PROBLEMS ON THIS QUESTIONNAIRE, HOW DIFFICULT HAVE THESE PROBLEMS MADE IT FOR YOU TO DO YOUR WORK, TAKE CARE OF THINGS AT HOME, OR GET ALONG WITH OTHER PEOPLE: SOMEWHAT DIFFICULT

## 2018-10-25 ASSESSMENT — PATIENT HEALTH QUESTIONNAIRE - PHQ9
SUM OF ALL RESPONSES TO PHQ QUESTIONS 1-9: 8
5. POOR APPETITE OR OVEREATING: NOT AT ALL

## 2018-10-25 NOTE — PROGRESS NOTES
Adult Mental Health Partial Hospitalization Group Therapy Progress Note     Date: 10/23/18    Client Initial Individualized Goals for Treatment: be able to concentrate so can do my job, fix the depression so I can be happier and normal, don't want to spend all my time either working or sleeping/want a normal life, want to enjoy hobbies agian     Treatment Goals:  1) Safety:     Client will notify staff when needing assistance to develop or implement a coping plan to manage suicidal or self injurious urges.     Client will use coping plan for safety, as needed.  2) Symptom Management:     Abisai will process triggers in a self compassionate way and learn how to treat himself with kindness.    Abisai will Learn ways to process reactions to unhelpful comments and reframe his thought processes.  3) In Life Skills Abisai will:    Learn, practice, apply 2 skills/strategies for improved lifestyle balance with an emphasis on leisure and setting healthy boundaries for his time and energy.    Learn, practice, generalize 2 sensory or mindfulness based self regulation skills for improved concentration in his meaningful life roles, routines, and relationships.  4) Discharge preparation: Will develop an aftercare / transition plan by discharge date     Area of Treatment Focus:  Symptom Management, Personal Safety and Develop / Improve Independent Living Skills    Therapeutic Interventions/Treatment Strategies:  Support, Feedback, Safety Assessments, Structured Activity, Problem Solving, Clarification and Education    Response to Treatment Strategies:  Accepted Feedback, Listened, Focused on Goals, Attentive, Accepted Support and Alert    Name of Group:  OT life skills: Mental Health Management  Time: 1:00-1:50  Group member attendance: 5 of 6    Description and Outcome: Abisai attended and participated in a structured life skills group where purposeful experiential intervention focuses on psychoeducation, exploration, practice, and  generalizing taught independent living skills. Through the use of supportive social interactions, structured therapeutic and functional tasks in context, group members work towards stabilizing and managing mental health symptoms for improved participation and function in valued roles, routines, relationships, and independent living.     Provided group with verbal and written psychoeducation material focused on lifestyle balance in order to support mental health with an emphasis on the research and evidence around 5 things we can do to help with willpower and resiliency. Concepts of sleep hygiene, mindfulness, physical activity, nutrition, and connecting were reviewed and group members were validated for ways they are working on these items. This was followed by a sensory enhanced active meditation to illustrate and experience an embodied mind experience to gain self awareness and insight into how one can use their body to help them practice mindfulness in an easy, inexpensive way.  Group member discussed ways they can apply the skill to their everyday life. Abisai presents with even calm affect today. Validation and support provided. Abisai would benefit from additional opportunities to practice the content to be able to generalize it to his everyday life with increased intentionality, consistency, and efficacy in support of his psychiatric recovery. He worked towards ITP goal number 3    Is this a Weekly Review of the Progress on the Treatment Plan?  No.

## 2018-10-25 NOTE — PROGRESS NOTES
Adult Mental Health Partial Hospitalization Group Therapy Progress Note     Date: 10/24/18    Client Initial Individualized Goals for Treatment: be able to concentrate so can do my job, fix the depression so I can be happier and normal, don't want to spend all my time either working or sleeping/want a normal life, want to enjoy hobbies agian     Treatment Goals:  1) Safety:     Client will notify staff when needing assistance to develop or implement a coping plan to manage suicidal or self injurious urges.     Client will use coping plan for safety, as needed.  2) Symptom Management:     Abisai will process triggers in a self compassionate way and learn how to treat himself with kindness.    Abisai will Learn ways to process reactions to unhelpful comments and reframe his thought processes.  3) In Life Skills Abisai will:    Learn, practice, apply 2 skills/strategies for improved lifestyle balance with an emphasis on leisure and setting healthy boundaries for his time and energy.    Learn, practice, generalize 2 sensory or mindfulness based self regulation skills for improved concentration in his meaningful life roles, routines, and relationships.  4) Discharge preparation: Will develop an aftercare / transition plan by discharge date     Area of Treatment Focus:  Symptom Management, Personal Safety and Develop / Improve Independent Living Skills    Therapeutic Interventions/Treatment Strategies:  Support, Feedback, Safety Assessments, Structured Activity, Problem Solving, Clarification and Education    Response to Treatment Strategies:  Accepted Feedback, Listened, Focused on Goals, Attentive, Accepted Support and Alert    Name of Group:  OT life skills: Stress Management  Time: 1:00-1:50  Group member attendance: 4 of 5    Description and Outcome: Abisai attended and participated in a structured life skills group where purposeful experiential intervention focuses on psychoeducation, exploration, practice, and generalizing  taught independent living skills. Through the use of supportive social interactions, structured therapeutic and functional tasks in context, group members work towards stabilizing and managing mental health symptoms for improved participation and function in valued roles, routines, relationships, and independent living.     Abisai chose to work on a cognitively challenging activity to gain insight into how his focus and problem solving capacities are doing, as today he feels his symptoms are lower than in past day. He reports noting improved capacity to concentrate and felt good about that. He demonstrates improved affect and cognitive functioning during session compared to when he first started program.  Validation and support provided. Abisai would benefit from additional opportunities to practice the content to be able to generalize it to his everyday life with increased intentionality, consistency, and efficacy in support of his psychiatric recovery. He worked towards ITP goal number 3    Is this a Weekly Review of the Progress on the Treatment Plan?  No.

## 2018-10-25 NOTE — PROGRESS NOTES
Adult Mental Health Outpatient Group Therapy Progress Note     Client Initial Individualized Goals for Treatment: be able to concentrate so can do my job, fix the depression so I can be happier and normal, don't want to spend all my time either working or sleeping/want a normal life, want to enjoy hobbies again        Treatment Goals:  1) Safety:     Client will notify staff when needing assistance to develop or implement a coping plan to manage suicidal or self injurious urges.     Client will use coping plan for safety, as needed.  2) Symptom Management:     Abisai will process triggers in a self compassionate way and learn how to treat himself with kindness.    Abisai will Learn ways to process reactions to unhelpful comments and reframe his thought processes.  3) In Life Skills Abisai will:    Learn, practice, apply 2 skills/strategies for improved lifestyle balance with an emphasis on leisure and setting healthy boundaries for his time and energy.    Learn, practice, generalize 2 sensory or mindfulness based self regulation skills for improved concentration in his meaningful life roles, routines, and relationships.  4) Discharge preparation: Will develop an aftercare / transition plan by discharge date         Area of Treatment Focus:  Symptom Management, Personal Safety, Develop / Improve Independent Living Skills    Therapeutic Interventions/Treatment Strategies:  Support, Feedback, Safety Assessments, Structured Activity, Problem Solving and Education    Response to Treatment Strategies:  Accepted Feedback, Gave Feedback, Listened, Focused on Goals, Attentive, Accepted Support and Alert     Name of Group: Group Therapy Date/Time: 10/25/2018 / 0900 to 0950; Group Participants: 4 of 4  Name of Group: Interpersonal Communication Date/Time: 10/25/2018 / [id number]; Group Participants: 4 of 4    Description and Outcome:  Group therapy  Client reported feeling empty.  He said he is accepting that he may need more time to  manage his symptoms to be able to return to work.  He said he did talk to his wife about her insistence he talk to her family about his mental health and she said she needs support from them so he emailed her family with some basic information.  He said he feels exposed.  Group members validated his feelings and discussed self cares for change.  No SI reported.  Endnote    Client participated in a session on communication strategies.  Information on biology, environment, and previous experience affecting arousal was presented.  That was linked to communication and skills were presented to decrease arousal prior to communication.  Components of whole communication were presented and discussed among group members.  Examples were generated and problem solving barriers to effective communication were discussed among group members. Benefits of effective communication were linked to self-kindness and internal experiences in relationships resulting from improved communication.  Client demonstrated understanding by participating in problem solving and working on examples with other group members.    Is this a Weekly Review of the Progress on the Treatment Plan?  Yes.       Are Treatment Plan Goals being addressed?  Yes, client discharged.        Are Treatment Plan Strategies to Address Goals Effective?  Yes, Client discharged

## 2018-10-25 NOTE — PROGRESS NOTES
LOCUS Worksheet     Name: Dash Arizmendi MRN: [id number]    : 1965      Gender:  male    PMI:     Provider Name: Dr. Bajwa   Provider NPI:      Actual level of Care Provided:  Partial Hospitalization Program    Service(s) receiving or referred to:  Intensive Outpatient Program    Reason for Variance: finished Partial program today, 10/25/18      Rating completed by: YAIR White      I. Risk of Harm:   2      Low Risk of Harm    II. Functional Status:   3      Moderate Impairment    III. Co-Morbidity:   3      Significant Co-Morbidity    IV - A. Recovery Environment - Level of Stress:   3      Moderately Stress Environment    IV - B. Recovery Environment - Level of Support:   2      Supportive Environment    V. Treatment and Recovery History:   3      Moderate to Equivocal Response to Treatment and Recovery Management    VI. Engagement and Recovery Project:   2      Positive Engagement and Recovery       18 Composite Score    Level of Care Recommendation:   17 to 19       High Intensity Community Based Services

## 2018-10-25 NOTE — PROGRESS NOTES
Adult Mental Health Partial Hospitalization Group Therapy Progress Note     Date: 10/25/18    Client Initial Individualized Goals for Treatment: be able to concentrate so can do my job, fix the depression so I can be happier and normal, don't want to spend all my time either working or sleeping/want a normal life, want to enjoy hobbies agian     Treatment Goals:  1) Safety:     Client will notify staff when needing assistance to develop or implement a coping plan to manage suicidal or self injurious urges.     Client will use coping plan for safety, as needed.  2) Symptom Management:     Abisai will process triggers in a self compassionate way and learn how to treat himself with kindness.    Abisai will Learn ways to process reactions to unhelpful comments and reframe his thought processes.  3) In Life Skills Abisai will:    Learn, practice, apply 2 skills/strategies for improved lifestyle balance with an emphasis on leisure and setting healthy boundaries for his time and energy.    Learn, practice, generalize 2 sensory or mindfulness based self regulation skills for improved concentration in his meaningful life roles, routines, and relationships.  4) Discharge preparation: Will develop an aftercare / transition plan by discharge date     Area of Treatment Focus:  Symptom Management, Personal Safety and Develop / Improve Independent Living Skills    Therapeutic Interventions/Treatment Strategies:  Support, Feedback, Safety Assessments, Structured Activity, Problem Solving, Clarification and Education    Response to Treatment Strategies:  Accepted Feedback, Listened, Focused on Goals, Attentive, Accepted Support and Alert    Name of Group:  OT life skills clinic: Stress Management  Time: 1:00-1:50  Group member attendance: 4 of 4    Description and Outcome: Abisai attended and participated in a structured life skills group where purposeful experiential intervention focuses on psychoeducation, exploration, practice, and  "generalizing taught independent living skills. Through the use of supportive social interactions, structured therapeutic and functional tasks in context, group members work towards stabilizing and managing mental health symptoms for improved participation and function in valued roles, routines, relationships, and independent living.     Abisai chose to explore experiencing a novel repetititve activity to see if he found it calming. He is feeling discouraged that he will need more time for his recovery. Active listening provided and Abisai was able to express understanding and acceptance of his needs when framed in a metaphor which he found helpful. He works with good skill and effort. He reports he is interested in exploring more focused activity opportunities and called the OT clinic \"the room of possibility\". He reports readiness to discharge from the Partial hospitalization program and start a day treatment program tomorrow.  Validation and support provided. Abisai would benefit from additional opportunities to practice the content to be able to generalize it to his everyday life with increased intentionality, consistency, and efficacy in support of his psychiatric recovery. He worked towards ITP goal number 3    Is this a Weekly Review of the Progress on the Treatment Plan?  No.                "

## 2018-10-26 ASSESSMENT — ANXIETY QUESTIONNAIRES: GAD7 TOTAL SCORE: 4

## 2018-10-29 ENCOUNTER — HOSPITAL ENCOUNTER (OUTPATIENT)
Dept: BEHAVIORAL HEALTH | Facility: CLINIC | Age: 53
End: 2018-10-29
Attending: PSYCHIATRY & NEUROLOGY
Payer: COMMERCIAL

## 2018-10-29 PROBLEM — F31.81 BIPOLAR 2 DISORDER (H): Status: ACTIVE | Noted: 2018-10-29

## 2018-10-29 PROCEDURE — H2012 BEHAV HLTH DAY TREAT, PER HR: HCPCS

## 2018-10-29 ASSESSMENT — ANXIETY QUESTIONNAIRES
2. NOT BEING ABLE TO STOP OR CONTROL WORRYING: SEVERAL DAYS
7. FEELING AFRAID AS IF SOMETHING AWFUL MIGHT HAPPEN: MORE THAN HALF THE DAYS
6. BECOMING EASILY ANNOYED OR IRRITABLE: SEVERAL DAYS
1. FEELING NERVOUS, ANXIOUS, OR ON EDGE: SEVERAL DAYS
IF YOU CHECKED OFF ANY PROBLEMS ON THIS QUESTIONNAIRE, HOW DIFFICULT HAVE THESE PROBLEMS MADE IT FOR YOU TO DO YOUR WORK, TAKE CARE OF THINGS AT HOME, OR GET ALONG WITH OTHER PEOPLE: SOMEWHAT DIFFICULT
GAD7 TOTAL SCORE: 6
3. WORRYING TOO MUCH ABOUT DIFFERENT THINGS: NOT AT ALL
5. BEING SO RESTLESS THAT IT IS HARD TO SIT STILL: NOT AT ALL

## 2018-10-29 ASSESSMENT — PATIENT HEALTH QUESTIONNAIRE - PHQ9
SUM OF ALL RESPONSES TO PHQ QUESTIONS 1-9: 9
5. POOR APPETITE OR OVEREATING: SEVERAL DAYS

## 2018-10-29 NOTE — PROGRESS NOTES
Adult Mental Health Outpatient Group Therapy Progress Note        Client Initial Individualized Goals for Treatment: be able to concentrate so can do my job, fix the depression so I can be happier and normal, don't want to spend all my time either working or sleeping/want a normal life, want to enjoy hobbies again         Treatment Goals:  1) Safety:     Client will notify staff when needing assistance to develop or implement a coping plan to manage suicidal or self injurious urges.     Client will use coping plan for safety, as needed.  2) Symptom Management:     Abisai will process triggers in a self compassionate way and learn how to treat himself with kindness.    Abisai will Learn ways to process reactions to unhelpful comments and reframe his thought processes.  3) In Life Skills Abisai will:    Learn, practice, apply 2 skills/strategies for improved lifestyle balance with an emphasis on leisure and setting healthy boundaries for his time and energy.    Learn, practice, generalize 2 sensory or mindfulness based self regulation skills for improved concentration in his meaningful life roles, routines, and relationships.  4) Discharge preparation: Will develop an aftercare / transition plan by discharge date       Area of Treatment Focus:  Symptom Management, Personal Safety, Develop / Improve Independent Living Skills     Therapeutic Interventions/Treatment Strategies:  Support, Feedback, Safety Assessments, Structured Activity, Problem Solving and Education     Response to Treatment Strategies:  Accepted Feedback, Gave Feedback, Listened, Focused on Goals, Attentive, Accepted Support and Alert      Name of Group: Group Therapy Date/Time: 10/25/2018 / 0900 to 0950; Group Participants: 7 of 9     Description and Outcome:  Group therapy  Client reported being safe today.  He reported that his weekend was ok, and he helped his wife and son prepare for a Halloween party.  He stated that he had headaches, fatigue and anxiety.  He reported that he doesn't have panic attacks. He reported stress of caring for a sick cat, but now the cat is healed and better, so things are getting back to normal.  He reported that his depression has been ongoing for a long time and that he has suicidal thoughts regularly, and low motivation.  He stated that his sleep is ok, and he has medications to help him sleep at night.  He appeared restless and his leg did not stop shaking during the group.      Is this a Weekly Review of the Progress on the Treatment Plan?  Yes.       Are Treatment Plan Goals being addressed?  Yes        Are Treatment Plan Strategies to Address Goals Effective?  Yes

## 2018-10-30 ENCOUNTER — HOSPITAL ENCOUNTER (OUTPATIENT)
Dept: BEHAVIORAL HEALTH | Facility: CLINIC | Age: 53
End: 2018-10-30
Attending: PSYCHIATRY & NEUROLOGY
Payer: COMMERCIAL

## 2018-10-30 PROCEDURE — H2012 BEHAV HLTH DAY TREAT, PER HR: HCPCS

## 2018-10-30 ASSESSMENT — ANXIETY QUESTIONNAIRES: GAD7 TOTAL SCORE: 6

## 2018-11-02 NOTE — PROGRESS NOTES
Adult Mental Health Outpatient Group Therapy Progress Note      Abisai has transferred from the Partial Hospitalization Program. Treatment goals will be continued until new Treatment Plan for the Adult Mental Health Outpatient Program is established.     Client Initial Individualized Goals for Treatment: be able to concentrate so can do my job, fix the depression so I can be happier and normal, don't want to spend all my time either working or sleeping/want a normal life, want to enjoy hobbies agian      Treatment Goals:  1) Safety:     Client will notify staff when needing assistance to develop or implement a coping plan to manage suicidal or self injurious urges.     Client will use coping plan for safety, as needed.  2) Symptom Management:     Abisai will process triggers in a self compassionate way and learn how to treat himself with kindness.    Abisai will Learn ways to process reactions to unhelpful comments and reframe his thought processes.  3) In Life Skills Abisai will:    Learn, practice, apply 2 skills/strategies for improved lifestyle balance with an emphasis on leisure and setting healthy boundaries for his time and energy.    Learn, practice, generalize 2 sensory or mindfulness based self regulation skills for improved concentration in his meaningful life roles, routines, and relationships.  4) Discharge preparation: Will develop an aftercare / transition plan by discharge date    Area of Treatment Focus:  Symptom Management, Personal Safety and Develop / Improve Independent Living Skills    Therapeutic Interventions/Treatment Strategies:  Support, Feedback, Safety Assessments, Structured Activity, Problem Solving, Clarification and Education    Response to Treatment Strategies:  Accepted Feedback, Listened, Focused on Goals, Attentive, Accepted Support and Alert     Name of Group: Life Skills  Date: 10/29/18  Time: 10:00-10:50  Group Participants: 7 of 9    Description and Outcome:  Abisai attended and  participated in an structured life skills group where purposeful experiential intervention focuses on psychoeducation, exploration, practice, and generalizing taught independent living skills. Through the use of supportive social interactions, structured therapeutic and functional tasks in context, group members work towards stabilizing and managing mental health symptoms for improved participation and function in valued roles, routines, relationships, and independent living.     Provided client with verbal and written psychoeducation material focused on neuroscience, evidence, and benefits of simple positive thought processes and routines in order to support mental health. Abisai engages with some guardedness in the structured experiential group process. He is familiar to writer having worked with him in the Partial Hospitalization Program. He presents today with calm even affect, minimally engaged with milieu. He reports continued difficulties with sleep and low energy. He continues to process not going back to work right now and the loss he feels around that and working on accepting. Validation and support provided. Abisai would benefit from additional opportunities to practice the content to be able to generalize it to his everyday life with increased intentionality, consistency, and efficacy in support of his psychiatric recovery. He worked towards ITP goal number 3 today. Will continue to monitor and assess and develop life skills goals for this setting with Abisai in subsequent sessions.      Is this a Weekly Review of the Progress on the Treatment Plan?  No

## 2018-11-06 NOTE — PROGRESS NOTES
Adult Mental Health Outpatient Group Therapy Progress Note         Client Initial Individualized Goals for Treatment: be able to concentrate so can do my job, fix the depression so I can be happier and normal, don't want to spend all my time either working or sleeping/want a normal life, want to enjoy hobbies again          Treatment Goals:  1) Safety:     Client will notify staff when needing assistance to develop or implement a coping plan to manage suicidal or self injurious urges.     Client will use coping plan for safety, as needed.  2) Symptom Management:     Abisai will process triggers in a self compassionate way and learn how to treat himself with kindness.    Abisai will Learn ways to process reactions to unhelpful comments and reframe his thought processes.  3) In Life Skills Abisai will:    Learn, practice, apply 2 skills/strategies for improved lifestyle balance with an emphasis on leisure and setting healthy boundaries for his time and energy.    Learn, practice, generalize 2 sensory or mindfulness based self regulation skills for improved concentration in his meaningful life roles, routines, and relationships.  4) Discharge preparation: Will develop an aftercare / transition plan by discharge date      Area of Treatment Focus:  Symptom Management    Therapeutic Interventions/Treatment Strategies:  Support, Feedback and Cognitive Behavioral Therapy    Response to Treatment Strategies:  Accepted Feedback and Listened    Name of Group:  Group Psychotherapy 9:00 AM - 9:50 AM    Group Participants: 5 of 8.       Description and Outcome, Group Psychotherapy:  Abisai shared that is his hoping to go back to work as a  soon.  He said he has had trouble sleeping, despite being prescribed Topamax.  He also said he is unsure if his medications are effective and we discussed setting up an intake with a psychiatrist.  Abisai said he did not have anything to share with the group today.  He was attentive during  session, but did not provide any feedback.  Dash denied any personal safety concerns and was supportive of the other group members.    Client would benefit from additional opportunities to practice and implement content from this session: increasing participating in group as he becomes more comfortable.    Is this a Weekly Review of the Progress on the Treatment Plan?  No

## 2018-11-06 NOTE — ADDENDUM NOTE
Encounter addended by: Sergio Alejandro on: 11/6/2018  1:38 PM<BR>     Actions taken: Flowsheet accepted, Sign clinical note

## 2018-11-06 NOTE — PROGRESS NOTES
Adult Mental Health Outpatient Group Therapy Progress Note      Abisai has transferred from the Partial Hospitalization Program. Treatment goals will be continued until new Treatment Plan for the Adult Mental Health Outpatient Program is established.     Client Initial Individualized Goals for Treatment: be able to concentrate so can do my job, fix the depression so I can be happier and normal, don't want to spend all my time either working or sleeping/want a normal life, want to enjoy hobbies agian      Treatment Goals:  1) Safety:     Client will notify staff when needing assistance to develop or implement a coping plan to manage suicidal or self injurious urges.     Client will use coping plan for safety, as needed.  2) Symptom Management:     Abisai will process triggers in a self compassionate way and learn how to treat himself with kindness.    Abisai will Learn ways to process reactions to unhelpful comments and reframe his thought processes.  3) In Life Skills Abisai will:    Learn, practice, apply 2 skills/strategies for improved lifestyle balance with an emphasis on leisure and setting healthy boundaries for his time and energy.    Learn, practice, generalize 2 sensory or mindfulness based self regulation skills for improved concentration in his meaningful life roles, routines, and relationships.  4) Discharge preparation: Will develop an aftercare / transition plan by discharge date    Area of Treatment Focus:  Symptom Management, Personal Safety and Develop / Improve Independent Living Skills    Therapeutic Interventions/Treatment Strategies:  Support, Feedback, Safety Assessments, Structured Activity, Problem Solving, Clarification and Education    Response to Treatment Strategies:  Accepted Feedback, Listened, Focused on Goals, Attentive, Accepted Support and Alert     Name of Group: Life Skills  Date: 10/30/18  Time: 10:00-10:50  Group Participants: 6 of 9    Description and Outcome:  Abisai attended and  participated in an structured life skills group where purposeful experiential intervention focuses on psychoeducation, exploration, practice, and generalizing taught independent living skills. Through the use of supportive social interactions, structured therapeutic and functional tasks in context, group members work towards stabilizing and managing mental health symptoms for improved participation and function in valued roles, routines, relationships, and independent living.     Provided client with verbal and written psychoeducation material focused on identifying ways to instill routine gently and intentionally into a daily practice. Abisai engages today with good focus and also is able to share with writer the functional areas he will be able to improve. He struggles with sleep, lifestyle balance, cognitive processes (focus/concentration), decreased motivation to take care of homemaking tasks, and engaging in meaningful ways with his son. He revisited ideas he generated for leisure in the PHP program including playing guitar and building kits he has enjoyed in the past.  Validation and support provided. Abisai would benefit from additional opportunities to practice the content to be able to generalize it to his everyday life with increased intentionality, consistency, and efficacy in support of his psychiatric recovery. He worked towards ITP goal number 3 today. Will continue to monitor and assess and develop life skills goals for this setting with Abisai in subsequent sessions.      Is this a Weekly Review of the Progress on the Treatment Plan?  No

## 2018-11-07 ENCOUNTER — HOSPITAL ENCOUNTER (OUTPATIENT)
Dept: BEHAVIORAL HEALTH | Facility: CLINIC | Age: 53
End: 2018-11-07
Attending: PSYCHIATRY & NEUROLOGY
Payer: COMMERCIAL

## 2018-11-07 PROCEDURE — H2012 BEHAV HLTH DAY TREAT, PER HR: HCPCS

## 2018-11-08 ENCOUNTER — HOSPITAL ENCOUNTER (OUTPATIENT)
Dept: BEHAVIORAL HEALTH | Facility: CLINIC | Age: 53
End: 2018-11-08
Attending: PSYCHIATRY & NEUROLOGY
Payer: COMMERCIAL

## 2018-11-08 PROCEDURE — H2012 BEHAV HLTH DAY TREAT, PER HR: HCPCS

## 2018-11-08 NOTE — PROGRESS NOTES
Adult Mental Health Outpatient Group Therapy Progress Note     Client Initial Individualized Goals for Treatment: be able to concentrate so can do my job, fix the depression so I can be happier and normal, don't want to spend all my time either working or sleeping/want a normal life, want to enjoy hobbies agian    See Initial Treatment suggestions for the client during the time between Diagnostic Assessment and completion of the Master Individualized Treatment Plan.    Treatment Goals:  Treatment Goals:  1) Safety:     Client will notify staff when needing assistance to develop or implement a coping plan to manage suicidal or self injurious urges.     Client will use coping plan for safety, as needed.  2) Symptom Management:     Abisai will process triggers in a self compassionate way and learn how to treat himself with kindness.    Abisai will Learn ways to process reactions to unhelpful comments and reframe his thought processes.  3) In Life Skills Abisai will:    Learn, practice, apply 2 skills/strategies for improved lifestyle balance with an emphasis on leisure and setting healthy boundaries for his time and energy.    Learn, practice, generalize 2 sensory or mindfulness based self regulation skills for improved concentration in his meaningful life roles, routines, and relationships.  4) Discharge preparation: Will develop an aftercare / transition plan by discharge date     Area of Treatment Focus:  Symptom Management    Therapeutic Interventions/Treatment Strategies:  Support, Feedback, Safety Assessments, Structured Activity and Education    Response to Treatment Strategies:  Accepted Feedback, Listened, Attentive, Accepted Support and Alert     Name of Group: Life Skills  Date/Time: 11/8/18   2:00  to 2:50  Group Participants: 4 of 6     Description and Outcome:  Client presented with calm,even mood during first session in this group therapy program.Good focus and concentration during a discussion related to stress  management and coping skills. Goa l#1 (no personal safety concerns reported or observed).Goal #3 Not addressed.  Client would benefit from additional opportunities to practice and implement content from this session  in every day life,relationships and mental health recovery.    Is this a Weekly Review of the Progress on the Treatment Plan?  Yes.      Are Treatment Plan Goals being addressed?  Yes, continue treatment goals      Are Treatment Plan Strategies to Address Goals Effective?  Yes, continue treatment strategies      Are there any current contracts in place?  No

## 2018-11-08 NOTE — PROGRESS NOTES
Adult Mental Health Outpatient Group Therapy Progress Note      Client Initial Individualized Goals for Treatment: be able to concentrate so can do my job, fix the depression so I can be happier and normal, don't want to spend all my time either working or sleeping/want a normal life, want to enjoy hobbies agian     See Initial Treatment suggestions for the client during the time between Diagnostic Assessment and completion of the Master Individualized Treatment Plan.     Treatment Goals:  Treatment Goals:  1) Safety:     Client will notify staff when needing assistance to develop or implement a coping plan to manage suicidal or self injurious urges.     Client will use coping plan for safety, as needed.  2) Symptom Management:     Abisai will process triggers in a self compassionate way and learn how to treat himself with kindness.    Abisai will Learn ways to process reactions to unhelpful comments and reframe his thought processes.  3) In Life Skills Abisai will:    Learn, practice, apply 2 skills/strategies for improved lifestyle balance with an emphasis on leisure and setting healthy boundaries for his time and energy.    Learn, practice, generalize 2 sensory or mindfulness based self regulation skills for improved concentration in his meaningful life roles, routines, and relationships.  4) Discharge preparation: Will develop an aftercare / transition plan by discharge date  Area of Treatment Focus:  Symptom Management and Personal Safety    Therapeutic Interventions/Treatment Strategies:  Support, Feedback, Safety Assessments and Problem Solving    Response to Treatment Strategies:  Accepted Feedback, Gave Feedback, Listened, Focused on Goals, Attentive, Accepted Support and Alert     Name of Group: Group therapy Date/Time: 11.07.18 / 1400 to 1450; Group Participants: 6 of 6.    Description and Outcome:  Client reported having a hard day.  Client said he did not want to come to treatment because he feels embarrassed.   He said he feels embarrassed that he could not make it on his own.  He said he realizes he can no longer ignore his symptoms.  He said his family knows now how much he is struggling and they are being supportive so he needs to move forward in treatment and start working on managing his symptoms rather than ignoring them.  Group members validated similar problems and acknowledged his willingness to work on getting better.  Client denied current safety concerns.  Client verbalized understanding of session content by problem solving with group members and accepting feedback.    Is this a Weekly Review of the Progress on the Treatment Plan?  No

## 2018-11-08 NOTE — PROGRESS NOTES
Adult Mental Health Outpatient Group Therapy Progress Note     Client Initial Individualized Goals for Treatment: be able to concentrate so can do my job, fix the depression so I can be happier and normal, don't want to spend all my time either working or sleeping/want a normal life, want to enjoy hobbies agian    See Initial Treatment suggestions for the client during the time between Diagnostic Assessment and completion of the Master Individualized Treatment Plan.    Treatment Goals:  Treatment Goals:  1) Safety:     Client will notify staff when needing assistance to develop or implement a coping plan to manage suicidal or self injurious urges.     Client will use coping plan for safety, as needed.  2) Symptom Management:     Abisai will process triggers in a self compassionate way and learn how to treat himself with kindness.    Abisai will Learn ways to process reactions to unhelpful comments and reframe his thought processes.  3) In Life Skills Abisai will:    Learn, practice, apply 2 skills/strategies for improved lifestyle balance with an emphasis on leisure and setting healthy boundaries for his time and energy.    Learn, practice, generalize 2 sensory or mindfulness based self regulation skills for improved concentration in his meaningful life roles, routines, and relationships.  4) Discharge preparation: Will develop an aftercare / transition plan by discharge date     Area of Treatment Focus:  Symptom Management    Therapeutic Interventions/Treatment Strategies:  Support, Feedback, Safety Assessments, Structured Activity and Education    Response to Treatment Strategies:  Accepted Feedback, Listened, Attentive, Accepted Support and Alert     Name of Group: Life Skills  Date/Time: 11/7/18   1:00  to 1:50  Group Participants: 6 of 6     Description and Outcome:  Client presented with calm,even mood during first session in this group therapy program.Good focus and concentration during a discussion related to  personal mental health recovery and relapse prevention. Goal#1 (no personal safety concerns reported or observed).Goal #3 Not addressed.  Client would benefit from additional opportunities to practice and implement content from this session  in every day life,relationships and mental health recovery.    Is this a Weekly Review of the Progress on the Treatment Plan?  Yes.      Are Treatment Plan Goals being addressed?  Yes, continue treatment goals      Are Treatment Plan Strategies to Address Goals Effective?  Yes, continue treatment strategies      Are there any current contracts in place?  No

## 2018-11-09 NOTE — PROGRESS NOTES
Adult Mental Health Outpatient Group Therapy Progress Note      Client Initial Individualized Goals for Treatment: be able to concentrate so can do my job, fix the depression so I can be happier and normal, don't want to spend all my time either working or sleeping/want a normal life, want to enjoy hobbies elisaian     See Initial Treatment suggestions for the client during the time between Diagnostic Assessment and completion of the Master Individualized Treatment Plan.     Treatment Goals:  Treatment Goals:  1) Safety:     Client will notify staff when needing assistance to develop or implement a coping plan to manage suicidal or self injurious urges.     Client will use coping plan for safety, as needed.  2) Symptom Management:     Abisai will process triggers in a self compassionate way and learn how to treat himself with kindness.    Abisai will Learn ways to process reactions to unhelpful comments and reframe his thought processes.  3) In Life Skills Abisai will:    Learn, practice, apply 2 skills/strategies for improved lifestyle balance with an emphasis on leisure and setting healthy boundaries for his time and energy.    Learn, practice, generalize 2 sensory or mindfulness based self regulation skills for improved concentration in his meaningful life roles, routines, and relationships.  4) Discharge preparation: Will develop an aftercare / transition plan by discharge date  Area of Treatment Focus:  Symptom Management and Personal Safety    Therapeutic Interventions/Treatment Strategies:  Support, Feedback, Safety Assessments and Problem Solving    Response to Treatment Strategies:  Accepted Feedback, Gave Feedback, Listened, Focused on Goals, Attentive, Accepted Support and Alert     Name of Group: Group therapy Date/Time: 11.08.18 / 1300 to 1350; Group Participants: 4 of 6.    Description and Outcome:  Client reported increased energy, decreased need for sleep, and increased goal directed activity. He said he feels  like the world is moving too slowly.   When he did sleep he said he has nightmares.  Client said his wife is aware and he has taken precautions to manage his behavior, has given his wife the credit cards.  He said he just went back on his meds which include a mood stabilizer, and will call his MD if it gets any worse.  Discussed sleep hygiene and resting. He denied suicidal thoughts.    Is this a Weekly Review of the Progress on the Treatment Plan?  Yes.      Are Treatment Plan Goals being addressed?  Yes, continue treatment goals      Are Treatment Plan Strategies to Address Goals Effective?  Yes, continue treatment strategies

## 2018-11-12 ENCOUNTER — HOSPITAL ENCOUNTER (OUTPATIENT)
Dept: BEHAVIORAL HEALTH | Facility: CLINIC | Age: 53
End: 2018-11-12
Attending: PSYCHIATRY & NEUROLOGY
Payer: COMMERCIAL

## 2018-11-12 PROCEDURE — H2012 BEHAV HLTH DAY TREAT, PER HR: HCPCS

## 2018-11-13 NOTE — PROGRESS NOTES
Adult Mental Health Outpatient Group Therapy Progress Note     Client Initial Individualized Goals for Treatment: be able to concentrate so can do my job, fix the depression so I can be happier and normal, don't want to spend all my time either working or sleeping/want a normal life, want to enjoy hobbies agian    See Initial Treatment suggestions for the client during the time between Diagnostic Assessment and completion of the Master Individualized Treatment Plan.    Treatment Goals:  Treatment Goals:  1) Safety:     Client will notify staff when needing assistance to develop or implement a coping plan to manage suicidal or self injurious urges.     Client will use coping plan for safety, as needed.  2) Symptom Management:     Abisai will process triggers in a self compassionate way and learn how to treat himself with kindness.    Abisai will Learn ways to process reactions to unhelpful comments and reframe his thought processes.  3) In Life Skills Abisai will:    Learn, practice, apply 2 skills/strategies for improved lifestyle balance with an emphasis on leisure and setting healthy boundaries for his time and energy.    Learn, practice, generalize 2 sensory or mindfulness based self regulation skills for improved concentration in his meaningful life roles, routines, and relationships.  4) Discharge preparation: Will develop an aftercare / transition plan by discharge date     Area of Treatment Focus:  Symptom Management    Therapeutic Interventions/Treatment Strategies:  Support, Feedback, Safety Assessments, Structured Activity and Education    Response to Treatment Strategies:  Accepted Feedback, Listened, Attentive, Accepted Support and Alert     Name of Group: Life Skills  Date/Time: 11/12/18   2:00  to 2:50  Group Participants: 5 of 7     Description and Outcome:  Client presented with calm,even mood with good focus and concentration. Psychosocial skills addressed included stress avoidance strategies,leisure  education,self esteem and practice of communication strategies.Goal #1 (no personal safety concerns reported or observed).Goal #3 Addressed per group participation  Client would benefit from additional opportunities to practice and implement content from this session  in every day life,relationships and mental health recovery.    Is this a Weekly Review of the Progress on the Treatment Plan?  Yes.      Are Treatment Plan Goals being addressed?  Yes, continue treatment goals      Are Treatment Plan Strategies to Address Goals Effective?  Yes, continue treatment strategies      Are there any current contracts in place?  No       Universal Safety Interventions

## 2018-11-14 ENCOUNTER — HOSPITAL ENCOUNTER (OUTPATIENT)
Dept: BEHAVIORAL HEALTH | Facility: CLINIC | Age: 53
End: 2018-11-14
Attending: PSYCHIATRY & NEUROLOGY
Payer: COMMERCIAL

## 2018-11-14 PROCEDURE — H2012 BEHAV HLTH DAY TREAT, PER HR: HCPCS

## 2018-11-14 NOTE — PROGRESS NOTES
Adult Mental Health Outpatient Group Therapy Progress Note      Client Initial Individualized Goals for Treatment: be able to concentrate so can do my job, fix the depression so I can be happier and normal, don't want to spend all my time either working or sleeping/want a normal life, want to enjoy hobbies agian     See Initial Treatment suggestions for the client during the time between Diagnostic Assessment and completion of the Master Individualized Treatment Plan.     Treatment Goals:  Treatment Goals:  1) Safety:     Client will notify staff when needing assistance to develop or implement a coping plan to manage suicidal or self injurious urges.     Client will use coping plan for safety, as needed.  2) Symptom Management:     Abisai will process triggers in a self compassionate way and learn how to treat himself with kindness.    Abisai will Learn ways to process reactions to unhelpful comments and reframe his thought processes.  3) In Life Skills Abisai will:    Learn, practice, apply 2 skills/strategies for improved lifestyle balance with an emphasis on leisure and setting healthy boundaries for his time and energy.    Learn, practice, generalize 2 sensory or mindfulness based self regulation skills for improved concentration in his meaningful life roles, routines, and relationships.  4) Discharge preparation: Will develop an aftercare / transition plan by discharge date  Area of Treatment Focus:  Symptom Management and Personal Safety    Therapeutic Interventions/Treatment Strategies:  Support, Feedback, Safety Assessments and Problem Solving    Response to Treatment Strategies:  Accepted Feedback, Gave Feedback, Listened, Focused on Goals, Attentive, Accepted Support and Alert     Name of Group: Group therapy Date/Time: 11.08.18 / 1300 to 1350; Group Participants: 5 of 7.    Description and Outcome:  Client reported mixed weekend.  Client reported poor focus and concentration.  Client reported the lorrie is gone and  he is now more depressed.  He said he is frustrated by tasks; namely is STD paperwork.  He reported he was told he is not covered because the paperwork was not completed properly.  He said he is going to go to the MD office today to figure it out.  Group members provided support and ideas for self-cares.  No SI reported.  Client verbalized understanding of session content by problem solving with group members and accepting feedback.    Is this a Weekly Review of the Progress on the Treatment Plan?  Yes.      Are Treatment Plan Goals being addressed?  Yes, continue treatment goals      Are Treatment Plan Strategies to Address Goals Effective?  Yes, continue treatment strategies

## 2018-11-15 ENCOUNTER — HOSPITAL ENCOUNTER (OUTPATIENT)
Dept: BEHAVIORAL HEALTH | Facility: CLINIC | Age: 53
End: 2018-11-15
Attending: PSYCHIATRY & NEUROLOGY
Payer: COMMERCIAL

## 2018-11-15 PROCEDURE — H2012 BEHAV HLTH DAY TREAT, PER HR: HCPCS

## 2018-11-15 NOTE — PROGRESS NOTES
Adult Mental Health Outpatient Group Therapy Progress Note      Client Initial Individualized Goals for Treatment: be able to concentrate so can do my job, fix the depression so I can be happier and normal, don't want to spend all my time either working or sleeping/want a normal life, want to enjoy hobbies agian     See Initial Treatment suggestions for the client during the time between Diagnostic Assessment and completion of the Master Individualized Treatment Plan.       Treatment Goals:  1) Safety:     Client will notify staff when needing assistance to develop or implement a coping plan to manage suicidal or self injurious urges.     Client will use coping plan for safety, as needed.  2) Symptom Management:     Abisai will process triggers in a self compassionate way and learn how to treat himself with kindness.    Abisai will Learn ways to process reactions to unhelpful comments and reframe his thought processes.  3) In Life Skills Abisai will:    Learn, practice, apply 2 skills/strategies for improved lifestyle balance with an emphasis on leisure and setting healthy boundaries for his time and energy.    Learn, practice, generalize 2 sensory or mindfulness based self regulation skills for improved concentration in his meaningful life roles, routines, and relationships.  4) Discharge preparation: Will develop an aftercare / transition plan by discharge date  Area of Treatment Focus:  Symptom Management and Personal Safety    Therapeutic Interventions/Treatment Strategies:  Support, Feedback, Safety Assessments and Problem Solving    Response to Treatment Strategies:  Accepted Feedback, Gave Feedback, Listened, Focused on Goals, Attentive, Accepted Support and Alert     Name of Group: Group therapy Date/Time: 11.12.18 / 1300 to 1350; Group Participants: 5 of 7.    Description and Outcome:  Client reported mixed weekend.  Client reported poor focus and concentration.  Client reported the lorrie is gone and he is now more  depressed.  He said he is frustrated by tasks; namely is STD paperwork.  He reported he was told he is not covered because the paperwork was not completed properly.  He said he is going to go to the MD office today to figure it out.  Group members provided support and ideas for self-cares.  No SI reported.  Client verbalized understanding of session content by problem solving with group members and accepting feedback.  Is this a Weekly Review of the Progress on the Treatment Plan?  No

## 2018-11-15 NOTE — PROGRESS NOTES
Adult Mental Health Outpatient Group Therapy Progress Note      Client Initial Individualized Goals for Treatment: be able to concentrate so can do my job, fix the depression so I can be happier and normal, don't want to spend all my time either working or sleeping/want a normal life, want to enjoy hobbies agian     See Initial Treatment suggestions for the client during the time between Diagnostic Assessment and completion of the Master Individualized Treatment Plan.     Treatment Goals:  Treatment Goals:  1) Safety:     Client will notify staff when needing assistance to develop or implement a coping plan to manage suicidal or self injurious urges.     Client will use coping plan for safety, as needed.  2) Symptom Management:     Abisai will process triggers in a self compassionate way and learn how to treat himself with kindness.    Abisai will Learn ways to process reactions to unhelpful comments and reframe his thought processes.  3) In Life Skills Abisai will:    Learn, practice, apply 2 skills/strategies for improved lifestyle balance with an emphasis on leisure and setting healthy boundaries for his time and energy.    Learn, practice, generalize 2 sensory or mindfulness based self regulation skills for improved concentration in his meaningful life roles, routines, and relationships.  4) Discharge preparation: Will develop an aftercare / transition plan by discharge date  Area of Treatment Focus:  Symptom Management and Personal Safety    Therapeutic Interventions/Treatment Strategies:  Support, Feedback, Safety Assessments and Problem Solving    Response to Treatment Strategies:  Accepted Feedback, Gave Feedback, Listened, Focused on Goals, Attentive, Accepted Support and Alert     Name of Group: Group therapy Date/Time: 11.14.18 / 1300 to 1350; Group Participants: 6 of 7.    Description and Outcome:  Client reported depressed mood.  He said he is trying to remain upbeat believing that will change his over all mood.  " He said people keep telling him he needs a hobby so he took up knitting.  He wore a hat he had just knit the day before. Writer noted his rapid speech.  Client said he does not feel sped up, but said he is trying to be \"upbeat\".  He reported adequate sleep.  He said he is bothered by insurance issues and is continuing to work on completing the paperwork with his PCP.  He said he is out of his med refill and the prescriber from [facility] will not refill.  He agreed to call the PCP to get the refill as that is who usually refills his meds.  Group members provided support and encouragement for follow through.  No SI reported.  Client verbalized understanding of session content by problem solving with group members and accepting feedback.      Is this a Weekly Review of the Progress on the Treatment Plan?  No  "

## 2018-11-15 NOTE — PROGRESS NOTES
Adult Mental Health Outpatient Group Therapy Progress Note     Client Initial Individualized Goals for Treatment: be able to concentrate so can do my job, fix the depression so I can be happier and normal, don't want to spend all my time either working or sleeping/want a normal life, want to enjoy hobbies agian       Treatment Goals continued from Partial Hospitalization Program until new goals set in Day Treatment Program:    1) Safety:     Client will notify staff when needing assistance to develop or implement a coping plan to manage suicidal or self injurious urges.     Client will use coping plan for safety, as needed.  2) Symptom Management:     Abisai will process triggers in a self compassionate way and learn how to treat himself with kindness.    Abisai will Learn ways to process reactions to unhelpful comments and reframe his thought processes.  3) In Life Skills Abisai will:    Learn, practice, apply 2 skills/strategies for improved lifestyle balance with an emphasis on leisure and setting healthy boundaries for his time and energy.    Learn, practice, generalize 2 sensory or mindfulness based self regulation skills for improved concentration in his meaningful life roles, routines, and relationships.  4) Discharge preparation: Will develop an aftercare / transition plan by discharge date       Area of Treatment Focus:  Symptom Management    Therapeutic Interventions/Treatment Strategies:  Support, Feedback, Structured Activity, Problem Solving, Clarification and Education    Response to Treatment Strategies:  Accepted Feedback, Listened, Focused on Goals, Attentive, Accepted Support and Alert     Name of Group: Mental Health Management  Date: 11/14/18  Time: 3:00-3:50   Group Participants: 5 of 7    Description and Outcome:  Abisai attended and participated in a mental health management group where verbal and written psychoeducation on self regulation was provided. Focus on sensory modulation including: identifying  nevous system level of arousal, need for calming vs. need for alerting. Exploration of calming and alerting properties of internal and external sensory input facilitated which included identifying personal preferences for improved emotional self regulation when feeling distressed. Abisai is engaged with the experiential process, shares easily and is able to identify 2 of his sensory preferences for calming and alerting as needed. Calm even affect. Abisai is familiar with content but continues to find ways to apply it to his everyday life consistently. He would benefit from additional opportunities to practice the content to be able to generalize it to his everyday life with increased intentionality, consistency, and efficacy in support of his psychiatric recovery. He worked towards goal number 3 today.     Is this a Weekly Review of the Progress on the Treatment Plan?  No

## 2018-11-19 NOTE — PROGRESS NOTES
Acknowledgement of Current Treatment Plan       I have reviewed my treatment plan with my therapist / counselor on 11/21/18   I agree with the plan as it is written in the electronic health record. (1B)    Name:      Signature:  Dash Tabor MD  Psychiatrist    Amelie Daniels, NP    Camille Livingston, LP, MSEd  Psychotherapist    Akilah Naylor, MSN, RN, PHN  Nurse Liaison    Tim Watkins OTR/L   Occupational Therapist

## 2018-11-19 NOTE — PROGRESS NOTES
Individualized Treatment Plan     Date of Plan: 18    Name: Dash Arizmendi MRN: [id number]    : 1965    Programs:  Day Treatment (DT)     Clinical Track (if applicable):  1B    DSM5 Diagnosis  296.89 Bipolar II Disorder Depressed    Team Members Contributing to Plan:  Jd Watkins and Camille Livingston    Client Strengths:  goal-focused, insightful, intelligent, open to learning and responsible parent .     Client Participation in Plan:  Contributed to goals and plan     Areas of Vulnerability:   Suicidal Ideation  Anxiety with/without panic attacks  Depressive symptoms    Long-Term Goals:  Knowledge about illness and management of symptoms   Maintenance of personal safety     Abuse Prevention Plan:  Safe, therapeutic environment   Safety coping plan as needed   Education regarding illness and skill development   Coordination with care providers     Discharge Criteria:  Satisfactory progress toward treatment goals   Improvement re: identified problems and symptoms   Ability to continue recovery at next level of service   Has a discharge plan in place   Has safety/coping plan in place      Tentative Discharge Date: 19    Areas of Treatment Focus            Area of Treatment Focus:   Personal Safety  Start Date:    18    Goal:  Target Date: 18 Status: Active  Client will notify staff when needing assistance to develop or implement a coping plan to manage suicidal or self injurious urges.  Client will use coping plan for safety, as needed.         Progress:           Treatment Strategies:   Assess / reassess level of potential for harm to self or others  Engage in safety planning when indicated  Facilitate increased self awareness          Area of Treatment Focus:   Symptom Stabilization and Management  Start Date:    18    Goal:  Target Date: 18 Status: Active  When in life skills group Abisai will learn and practice 1-2 self compassion/coping strategies to help improve self  esteem providing an update of progress weekly.      Progress:           Treatment Strategies:   Facilitate increased self awareness  Provide education regarding strategies to improve self esteem.            Area of Treatment Focus:   Symptom Stabilization and Management  Start Date:   11/21/8    Goal:  Target Date: 12/19/18 Status: Active  Will report on symptoms and identify skills to use to manage mood and anxiety.      Progress:           Treatment Strategies:   Facilitate increased self awareness  Provide education regarding coping skills to manage mood and anxiety.          Area of Treatment Focus:   Health and Wellness  Start Date:    11/21/18    Goal:  Target Date: 12/19/18 Status: Active  Abisai would like to increase physical activity. Abisai will report weekly what he did to achieve this goal in the week prior.      Progress:           Treatment Strategies:   Facilitate increased self awareness

## 2018-11-19 NOTE — PROGRESS NOTES
Adult Mental Health Outpatient Group Therapy Progress Note     Client Initial Individualized Goals for Treatment: be able to concentrate so can do my job, fix the depression so I can be happier and normal, don't want to spend all my time either working or sleeping/want a normal life, want to enjoy hobbies agian    See Initial Treatment suggestions for the client during the time between Diagnostic Assessment and completion of the Master Individualized Treatment Plan.    Treatment Goals:  Treatment Goals:  1) Safety:     Client will notify staff when needing assistance to develop or implement a coping plan to manage suicidal or self injurious urges.     Client will use coping plan for safety, as needed.  2) Symptom Management:     Abisai will process triggers in a self compassionate way and learn how to treat himself with kindness.    Abisai will Learn ways to process reactions to unhelpful comments and reframe his thought processes.  3) In Life Skills Abisai will:    Learn, practice, apply 2 skills/strategies for improved lifestyle balance with an emphasis on leisure and setting healthy boundaries for his time and energy.    Learn, practice, generalize 2 sensory or mindfulness based self regulation skills for improved concentration in his meaningful life roles, routines, and relationships.  4) Discharge preparation: Will develop an aftercare / transition plan by discharge date     Area of Treatment Focus:  Symptom Management    Therapeutic Interventions/Treatment Strategies:  Support, Feedback, Safety Assessments, Structured Activity and Education    Response to Treatment Strategies:  Accepted Feedback, Listened, Attentive, Accepted Support and Alert     Name of Group: Life Skills  Date/Time: 11/14/18   2:00  to 2:50  Group Participants: 6 of 7     Description and Outcome:  Client presented with calm,even mood with good focus and concentration. Psychosocial skills addressed included a discussion related to stress management  with a focus on healthy lifestyle. Goal #1 (no personal safety concerns reported or observed).Goal #3 Addressed per group participation  Client would benefit from additional opportunities to practice and implement content from this session  in every day life,relationships and mental health recovery.    Is this a Weekly Review of the Progress on the Treatment Plan?  Yes.      Are Treatment Plan Goals being addressed?  Yes, continue treatment goals      Are Treatment Plan Strategies to Address Goals Effective?  Yes, continue treatment strategies      Are there any current contracts in place?  No

## 2018-11-20 NOTE — PROGRESS NOTES
Adult Mental Health Outpatient Group Therapy Progress Note      Client Initial Individualized Goals for Treatment: be able to concentrate so can do my job, fix the depression so I can be happier and normal, don't want to spend all my time either working or sleeping/want a normal life, want to enjoy hobbies elisaian     See Initial Treatment suggestions for the client during the time between Diagnostic Assessment and completion of the Master Individualized Treatment Plan.     Treatment Goals:  Treatment Goals:  1) Safety:     Client will notify staff when needing assistance to develop or implement a coping plan to manage suicidal or self injurious urges.     Client will use coping plan for safety, as needed.  2) Symptom Management:     Abisai will process triggers in a self compassionate way and learn how to treat himself with kindness.    Abisai will Learn ways to process reactions to unhelpful comments and reframe his thought processes.  3) In Life Skills Abisai will:    Learn, practice, apply 2 skills/strategies for improved lifestyle balance with an emphasis on leisure and setting healthy boundaries for his time and energy.    Learn, practice, generalize 2 sensory or mindfulness based self regulation skills for improved concentration in his meaningful life roles, routines, and relationships.  4) Discharge preparation: Will develop an aftercare / transition plan by discharge date  Area of Treatment Focus:  Symptom Management and Personal Safety    Therapeutic Interventions/Treatment Strategies:  Support, Feedback, Safety Assessments and Problem Solving    Response to Treatment Strategies:  Accepted Feedback, Gave Feedback, Listened, Focused on Goals, Attentive, Accepted Support and Alert     Name of Group: Group therapy Date/Time: 11.15.18 / 1300 to 1350; Group Participants: 4 of 6.    Description and Outcome:  Client reported he had a better day yesterday. He did reported he slept well.  He said he went out with his son and  that was helpful.  He knit another hat and it feels good to keep his hands busy.  He said he wants to try to code with some friends to see what his concentration is like and agreed to go today after group to an event.  Group members provided support.  No SI reported. Client verbalized understanding of session content by problem solving with group members and accepting feedback.    Is this a Weekly Review of the Progress on the Treatment Plan?  No

## 2018-11-21 ENCOUNTER — HOSPITAL ENCOUNTER (OUTPATIENT)
Dept: BEHAVIORAL HEALTH | Facility: CLINIC | Age: 53
End: 2018-11-21
Attending: PSYCHIATRY & NEUROLOGY
Payer: COMMERCIAL

## 2018-11-21 PROCEDURE — H2012 BEHAV HLTH DAY TREAT, PER HR: HCPCS

## 2018-11-21 NOTE — PROGRESS NOTES
Adult Mental Health Outpatient Group Therapy Progress Note      Client Initial Individualized Goals for Treatment: be able to concentrate so can do my job, fix the depression so I can be happier and normal, don't want to spend all my time either working or sleeping/want a normal life, want to enjoy hobbies agian     See Initial Treatment suggestions for the client during the time between Diagnostic Assessment and completion of the Master Individualized Treatment Plan.     Treatment Goals:  Treatment Goals:  1) Safety:     Client will notify staff when needing assistance to develop or implement a coping plan to manage suicidal or self injurious urges.     Client will use coping plan for safety, as needed.  2) Symptom Management:     Abisai will process triggers in a self compassionate way and learn how to treat himself with kindness.    Abisai will Learn ways to process reactions to unhelpful comments and reframe his thought processes.  3) In Life Skills Abisai will:    Learn, practice, apply 2 skills/strategies for improved lifestyle balance with an emphasis on leisure and setting healthy boundaries for his time and energy.    Learn, practice, generalize 2 sensory or mindfulness based self regulation skills for improved concentration in his meaningful life roles, routines, and relationships.  4) Discharge preparation: Will develop an aftercare / transition plan by discharge date  Area of Treatment Focus:  Symptom Management and Personal Safety    Therapeutic Interventions/Treatment Strategies:  Support, Feedback, Safety Assessments and Problem Solving    Response to Treatment Strategies:  Accepted Feedback, Gave Feedback, Listened, Focused on Goals, Attentive, Accepted Support and Alert     Name of Group: Group therapy Date/Time: 11.21.18 / 1500 to 1550; Group Participants: 3 of 4.    Description and Outcome:  Client reported he had a panic attack on the way to tx today.  He said he had to pull over to the side of the road .   He said it came on quickly and surprised him because he had not had one in a while.  He said he managed through it  He said he has learned how to cope, but is annoyed it happened again because he has not had a panic attack in a few months.  Discussed skills use and encouragement for continued work to understand triggers.  He reports continued poor focus and concentration.  He said he sees his MD 11/29 to determine a return work date, but worries he will not be able to focus at work.  Determined his problem is related to working memory and problem solved compensatory strategies.  No SI reported.  Client verbalized understanding of session content by problem solving with group members and accepting feedback.      Is this a Weekly Review of the Progress on the Treatment Plan?  Yes     Are Treatment Plan Goals being addressed?  Yes, continue treatment goals      Are Treatment Plan Strategies to Address Goals Effective?  Yes, continue treatment strategies

## 2018-11-21 NOTE — PROGRESS NOTES
Adult Mental Health Outpatient Group Therapy Progress Note     Client Initial Individualized Goals for Treatment: be able to concentrate so can do my job, fix the depression so I can be happier and normal, don't want to spend all my time either working or sleeping/want a normal life, want to enjoy hobbies again    See Initial Treatment suggestions for the client during the time between Diagnostic Assessment and completion of the Master Individualized Treatment Plan.    Treatment Goals:  Treatment Goals:  1) Safety:     Client will notify staff when needing assistance to develop or implement a coping plan to manage suicidal or self injurious urges.     Client will use coping plan for safety, as needed.  2) Symptom Management:     Abisai will process triggers in a self compassionate way and learn how to treat himself with kindness.    Abisai will Learn ways to process reactions to unhelpful comments and reframe his thought processes.  3) In Life Skills Abisai will:    Learn, practice, apply 2 skills/strategies for improved lifestyle balance with an emphasis on leisure and setting healthy boundaries for his time and energy.    Learn, practice, generalize 2 sensory or mindfulness based self regulation skills for improved concentration in his meaningful life roles, routines, and relationships.  4) Discharge preparation: Will develop an aftercare / transition plan by discharge date.     Area of Treatment Focus:  Symptom Management    Therapeutic Interventions/Treatment Strategies:  Support, Feedback, Safety Assessments, Structured Activity and Education    Response to Treatment Strategies:  Accepted Feedback, Listened, Attentive, Accepted Support and Alert     Name of Group: Life Skills  Date/Time: 11/21/18   1:00  to 1:50  Group Participants: 3 of 4     Description and Outcome:  Client presented with calm,even mood with good focus and concentration. Psychosocial skills addressed included a discussion related to sources of stress  and coping skills. Goal #1 (no personal safety concerns reported or observed).Goal #3 Addressed per group participation.  Client would benefit from additional opportunities to practice and implement content from this session  in every day life,relationships and mental health recovery.    Is this a Weekly Review of the Progress on the Treatment Plan?  Yes.      Are Treatment Plan Goals being addressed?  Yes, continue treatment goals      Are Treatment Plan Strategies to Address Goals Effective?  Yes, continue treatment strategies      Are there any current contracts in place?  No

## 2018-11-26 ENCOUNTER — HOSPITAL ENCOUNTER (OUTPATIENT)
Dept: BEHAVIORAL HEALTH | Facility: CLINIC | Age: 53
End: 2018-11-26
Attending: PSYCHIATRY & NEUROLOGY
Payer: COMMERCIAL

## 2018-11-26 PROCEDURE — H2012 BEHAV HLTH DAY TREAT, PER HR: HCPCS

## 2018-11-27 NOTE — PROGRESS NOTES
Adult Mental Health Outpatient Group Therapy Progress Note      Client Initial Individualized Goals for Treatment: be able to concentrate so can do my job, fix the depression so I can be happier and normal, don't want to spend all my time either working or sleeping/want a normal life, want to enjoy hobbies elisaian     See Initial Treatment suggestions for the client during the time between Diagnostic Assessment and completion of the Master Individualized Treatment Plan.     Treatment Goals:  Treatment Goals:  1) Safety:     Client will notify staff when needing assistance to develop or implement a coping plan to manage suicidal or self injurious urges.     Client will use coping plan for safety, as needed.  2) Symptom Management:     Abisai will process triggers in a self compassionate way and learn how to treat himself with kindness.    Abisai will Learn ways to process reactions to unhelpful comments and reframe his thought processes.  3) In Life Skills Abisai will:    Learn, practice, apply 2 skills/strategies for improved lifestyle balance with an emphasis on leisure and setting healthy boundaries for his time and energy.    Learn, practice, generalize 2 sensory or mindfulness based self regulation skills for improved concentration in his meaningful life roles, routines, and relationships.  4) Discharge preparation: Will develop an aftercare / transition plan by discharge date  Area of Treatment Focus:  Symptom Management and Personal Safety    Therapeutic Interventions/Treatment Strategies:  Support, Feedback, Safety Assessments and Problem Solving    Response to Treatment Strategies:  Accepted Feedback, Gave Feedback, Listened, Focused on Goals, Attentive, Accepted Support and Alert     Name of Group: Group therapy Date/Time: 11.26.18 / 1300 to 1650; Group Participants: 6 of 7.    Description and Outcome:  Client reported depressed mood, no change in mood over the weekend.  He said he is getting worried about his work  return next week as he is getting invites to meetings and emails about work groups.  He said he still does not have concentration and focus where he needs it to feel successful for any length of time.  Discussed continued work on skills and trying small amounts each day to work up to what he needs--decreasing all/nothing thinking.  Group members provided encouragement and support.  No SI reported.  Client verbalized understanding of session content by problem solving with group members and accepting feedback.    Is this a Weekly Review of the Progress on the Treatment Plan?  No

## 2018-11-27 NOTE — PROGRESS NOTES
Adult Mental Health Outpatient Group Therapy Progress Note     Client Initial Individualized Goals for Treatment: be able to concentrate so can do my job, fix the depression so I can be happier and normal, don't want to spend all my time either working or sleeping/want a normal life, want to enjoy hobbies again    See Initial Treatment suggestions for the client during the time between Diagnostic Assessment and completion of the Master Individualized Treatment Plan.    Treatment Goals:  1.Client will notify staff when needing assistance to develop or implement a coping plan to manage suicidal or self injurious urges.  Client will use coping plan for safety, as needed.  2.When in life skills group Abisai will learn and practice 1-2 self compassion/coping strategies to help improve self esteem providing an update of progress weekly.  3. Will report on symptoms and identify skills to use to manage mood and anxiety.  4. Abisai would like to increase physical activity. Abisai will report weekly what he did to achieve this goal in the week prior.     Area of Treatment Focus:  Symptom Management    Therapeutic Interventions/Treatment Strategies:  Support, Feedback, Safety Assessments    Response to Treatment Strategies:  Accepted Feedback, Accepted Support      Name of Group: Life Skills  Date/Time: 11/26/18   2:00  to 2:50  Group Participants: 4 of 7     Description and Outcome:  Client presented as anxious and irritable. He talked with therapist and requested to leave the program early due to being triggered by what a new clien was talking about in group therapy. He reported that he was safe and just wanted to spend time with his wife and daughter.  Client would benefit from additional opportunities to practice and implement content from this session  in every day life,relationships and mental health recovery.    Is this a Weekly Review of the Progress on the Treatment Plan?  Yes.      Are Treatment Plan Goals being  addressed?  Yes, continue treatment goals      Are Treatment Plan Strategies to Address Goals Effective?  Yes, continue treatment strategies      Are there any current contracts in place?  No

## 2018-11-28 ENCOUNTER — HOSPITAL ENCOUNTER (OUTPATIENT)
Dept: BEHAVIORAL HEALTH | Facility: CLINIC | Age: 53
End: 2018-11-28
Attending: PSYCHIATRY & NEUROLOGY
Payer: COMMERCIAL

## 2018-11-28 PROCEDURE — H2012 BEHAV HLTH DAY TREAT, PER HR: HCPCS

## 2018-11-29 ENCOUNTER — HOSPITAL ENCOUNTER (OUTPATIENT)
Dept: BEHAVIORAL HEALTH | Facility: CLINIC | Age: 53
End: 2018-11-29
Attending: PSYCHIATRY & NEUROLOGY
Payer: COMMERCIAL

## 2018-11-29 PROCEDURE — H2012 BEHAV HLTH DAY TREAT, PER HR: HCPCS

## 2018-11-29 NOTE — PROGRESS NOTES
Adult Mental Health Outpatient Group Therapy Progress Note      Client Initial Individualized Goals for Treatment: be able to concentrate so can do my job, fix the depression so I can be happier and normal, don't want to spend all my time either working or sleeping/want a normal life, want to enjoy hobbies agian     See Initial Treatment suggestions for the client during the time between Diagnostic Assessment and completion of the Master Individualized Treatment Plan.     Treatment Goals:  Treatment Goals:  1) Safety:     Client will notify staff when needing assistance to develop or implement a coping plan to manage suicidal or self injurious urges.     Client will use coping plan for safety, as needed.  2) Symptom Management:     Abisai will process triggers in a self compassionate way and learn how to treat himself with kindness.    Abisai will Learn ways to process reactions to unhelpful comments and reframe his thought processes.  3) In Life Skills Abisai will:    Learn, practice, apply 2 skills/strategies for improved lifestyle balance with an emphasis on leisure and setting healthy boundaries for his time and energy.    Learn, practice, generalize 2 sensory or mindfulness based self regulation skills for improved concentration in his meaningful life roles, routines, and relationships.  4) Discharge preparation: Will develop an aftercare / transition plan by discharge date  Area of Treatment Focus:  Symptom Management and Personal Safety    Area of Treatment Focus:  Symptom Management     Therapeutic Interventions/Treatment Strategies:  Support, Feedback, Safety Assessment, Problem Solving and Education    Response to Treatment Strategies:  Accepted Feedback, Gave Feedback, Listened, Focused on Goals, Attentive, Accepted Support and Alert    Name of Group: Group Therapy Date/Time: 11/28/2018 / 1400 to 1450; Group Participants: 4 of 7,     Description and Outcome:  Group therapy  Client reported depressed mood and low  energy.  He said his wife is aware of his mood and being supportive.  He said he felt triggered by something said on Monday and declined to talk about it further because he said he was not sure what to say.  He said he is focused on self-cares and provided support to others.  No SI reported. Client verbalized understanding of session content by problem solving with group members.    Is this a Weekly Review of the Progress on the Treatment Plan?  No

## 2018-11-29 NOTE — PROGRESS NOTES
Adult Mental Health Outpatient Group Therapy Progress Note     Client Initial Individualized Goals for Treatment: be able to concentrate so can do my job, fix the depression so I can be happier and normal, don't want to spend all my time either working or sleeping/want a normal life, want to enjoy hobbies again    See Initial Treatment suggestions for the client during the time between Diagnostic Assessment and completion of the Master Individualized Treatment Plan.    Treatment Goals:  Treatment Goals:  1.Client will notify staff when needing assistance to develop or implement a coping plan to manage suicidal or self injurious urges.  Client will use coping plan for safety, as needed.  2.When in life skills group Abisai will learn and practice 1-2 self compassion/coping strategies to help improve self esteem providing an update of progress weekly.   3.Will report on symptoms and identify skills to use to manage mood and anxiety.  4.Abisai would like to increase physical activity. Abisai will report weekly what he did to achieve this goal in the week prior.    Area of Treatment Focus:  Symptom Management    Therapeutic Interventions/Treatment Strategies:  Support, Feedback, Safety Assessments, Structured Activity and Education    Response to Treatment Strategies:  Accepted Feedback, Listened, Attentive, Accepted Support and Alert     Name of Group: Life Skills  Date/Time: 11/28/18   1:00  to 1:50  Group Participants: 4 of 7     Description and Outcome:  Client presented with low mood with fair focus and concentration. Psychosocial skills addressed included a discussion related to sources of stress and coping skills. Goal #1 (no personal safety concerns reported or observed).  Client would benefit from additional opportunities to practice and implement content from this session  in every day life,relationships and mental health recovery.    Is this a Weekly Review of the Progress on the Treatment Plan?  Yes.      Are  Treatment Plan Goals being addressed?  Yes, continue treatment goals      Are Treatment Plan Strategies to Address Goals Effective?  Yes, continue treatment strategies      Are there any current contracts in place?  No

## 2018-11-30 NOTE — ADDENDUM NOTE
Encounter addended by: Shea Fleming RN on: 11/30/2018  2:51 PM<BR>     Actions taken: Flowsheet accepted

## 2018-11-30 NOTE — PROGRESS NOTES
Adult Mental Health Outpatient Group Therapy Progress Note     Client Initial Individualized Goals for Treatment: be able to concentrate so can do my job, fix the depression so I can be happier and normal, don't want to spend all my time either working or sleeping/want a normal life, want to enjoy hobbies again    See Initial Treatment suggestions for the client during the time between Diagnostic Assessment and completion of the Master Individualized Treatment Plan.    Treatment Goals:  Treatment Goals:  1.Client will notify staff when needing assistance to develop or implement a coping plan to manage suicidal or self injurious urges.  Client will use coping plan for safety, as needed.  2.When in life skills group Abisai will learn and practice 1-2 self compassion/coping strategies to help improve self esteem providing an update of progress weekly.   3.Will report on symptoms and identify skills to use to manage mood and anxiety.  4.Abisai would like to increase physical activity. Abisai will report weekly what he did to achieve this goal in the week prior.     Area of Treatment Focus:  Symptom Management    Therapeutic Interventions/Treatment Strategies:  Support, Feedback, Safety Assessments, Structured Activity and Education    Response to Treatment Strategies:  Accepted Feedback, Listened, Attentive, Accepted Support and Alert     Name of Group: Life Skills  Date/Time: 11/29/18   2:00  to 2:50  Group Participants: 5 of 7      Description and Outcome:  Client presented with low mood with fair focus and concentration. Psychosocial skills addressed included a discussion related to stress management with a focus on resiliency and commitment. Goal #1 (no personal safety concerns reported or observed). Goal #2 Client could not identify any self compassion skills that he has learned in Partial or this group program that he is using to improve his self esteem.   Client would benefit from additional opportunities to practice and  implement content from this session  in every day life,relationships and mental health recovery.    Is this a Weekly Review of the Progress on the Treatment Plan?  Yes.      Are Treatment Plan Goals being addressed?  Yes, continue treatment goals      Are Treatment Plan Strategies to Address Goals Effective?  Yes, continue treatment strategies      Are there any current contracts in place?  No

## 2018-11-30 NOTE — PROGRESS NOTES
Adult Mental Health Outpatient Group Therapy Progress Note      Client Initial Individualized Goals for Treatment: be able to concentrate so can do my job, fix the depression so I can be happier and normal, don't want to spend all my time either working or sleeping/want a normal life, want to enjoy hobbies agian     See Initial Treatment suggestions for the client during the time between Diagnostic Assessment and completion of the Master Individualized Treatment Plan.     Treatment Goals:  Treatment Goals:  1) Safety:     Client will notify staff when needing assistance to develop or implement a coping plan to manage suicidal or self injurious urges.     Client will use coping plan for safety, as needed.  2) Symptom Management:     Abisai will process triggers in a self compassionate way and learn how to treat himself with kindness.    Abisai will Learn ways to process reactions to unhelpful comments and reframe his thought processes.  3) In Life Skills Abisai will:    Learn, practice, apply 2 skills/strategies for improved lifestyle balance with an emphasis on leisure and setting healthy boundaries for his time and energy.    Learn, practice, generalize 2 sensory or mindfulness based self regulation skills for improved concentration in his meaningful life roles, routines, and relationships.  4) Discharge preparation: Will develop an aftercare / transition plan by discharge date  Area of Treatment Focus:  Symptom Management and Personal Safety    Area of Treatment Focus:  Symptom Management     Therapeutic Interventions/Treatment Strategies:  Support, Feedback, Safety Assessment, Problem Solving and Education    Response to Treatment Strategies:  Accepted Feedback, Gave Feedback, Listened, Focused on Goals, Attentive, Accepted Support and Alert    Name of Group: Group Therapy Date/Time: 11/29/2018 / 1300 to 1350; Group Participants: 5 of 7,     Description and Outcome:  Group therapy  Client reported depressed mood and low  energy.  He reported that he met with his MD and she told him she did not think he was ready to return to work and recommended further leave to which he agreed.  Discussed his recent hypomania and lower mood the past few days.  Discussed working on mood stability and concentration and focus before considering work return . Client agreed.  Group members provided support and validated his concerns as well as encouragement for self cares.  No SI reported.  Client verbalized understanding of session content by problem solving with group members and accepting feedback.    Is this a Weekly Review of the Progress on the Treatment Plan?  Yes.      Are Treatment Plan Goals being addressed?  Yes, continue treatment goals      Are Treatment Plan Strategies to Address Goals Effective?  Yes, continue treatment strategies      Are there any current contracts in place?  No

## 2018-12-03 ENCOUNTER — HOSPITAL ENCOUNTER (OUTPATIENT)
Dept: BEHAVIORAL HEALTH | Facility: CLINIC | Age: 53
End: 2018-12-03
Attending: PSYCHIATRY & NEUROLOGY
Payer: COMMERCIAL

## 2018-12-03 PROCEDURE — H2012 BEHAV HLTH DAY TREAT, PER HR: HCPCS

## 2018-12-04 NOTE — PROGRESS NOTES
Adult Mental Health Outpatient Group Therapy Progress Note     Client Initial Individualized Goals for Treatment: be able to concentrate so can do my job, fix the depression so I can be happier and normal, don't want to spend all my time either working or sleeping/want a normal life, want to enjoy hobbies again    See Initial Treatment suggestions for the client during the time between Diagnostic Assessment and completion of the Master Individualized Treatment Plan.    Treatment Goals:  Treatment Goals:  1.Client will notify staff when needing assistance to develop or implement a coping plan to manage suicidal or self injurious urges.  Client will use coping plan for safety, as needed.  2.When in life skills group Abisai will learn and practice 1-2 self compassion/coping strategies to help improve self esteem providing an update of progress weekly.   3.Will report on symptoms and identify skills to use to manage mood and anxiety.  4.Abisai would like to increase physical activity. Abisai will report weekly what he did to achieve this goal in the week prior.     Area of Treatment Focus:  Symptom Management    Therapeutic Interventions/Treatment Strategies:  Support, Feedback, Safety Assessments, Structured Activity and Education    Response to Treatment Strategies:  Accepted Feedback, Listened, Attentive, Accepted Support and Alert     Name of Group: Life Skills(2-3)  Group Participants: 5 of 7      Description and Outcome:  Client presented with low mood with good focus and concentration. Psychosocial skills addressed included stress avoidance strategies,leisure education,self esteem and practice of communication strategies.Jenn repotted that he had a very positive meeting with his work supervisor which was validating and supportive.Client also addressed discharge plans which most likely will include DBT, Goal #1 (no personal safety concerns reported or observed). Goal #2 With therapist assistance client review handouts  related to strategies to improve self esteem.  Client would benefit from additional opportunities to practice and implement content from this session  in every day life,relationships and mental health recovery.    Is this a Weekly Review of the Progress on the Treatment Plan?  Yes.      Are Treatment Plan Goals being addressed?  Yes, continue treatment goals      Are Treatment Plan Strategies to Address Goals Effective?  Yes, continue treatment strategies      Are there any current contracts in place?  No

## 2018-12-04 NOTE — PROGRESS NOTES
Adult Mental Health Outpatient Group Therapy Progress Note      Client Initial Individualized Goals for Treatment: be able to concentrate so can do my job, fix the depression so I can be happier and normal, don't want to spend all my time either working or sleeping/want a normal life, want to enjoy hobbies agian     See Initial Treatment suggestions for the client during the time between Diagnostic Assessment and completion of the Master Individualized Treatment Plan.     Treatment Goals:  Treatment Goals:  1) Safety:     Client will notify staff when needing assistance to develop or implement a coping plan to manage suicidal or self injurious urges.     Client will use coping plan for safety, as needed.  2) Symptom Management:     Abisai will process triggers in a self compassionate way and learn how to treat himself with kindness.    Abisai will Learn ways to process reactions to unhelpful comments and reframe his thought processes.  3) In Life Skills Abisai will:    Learn, practice, apply 2 skills/strategies for improved lifestyle balance with an emphasis on leisure and setting healthy boundaries for his time and energy.    Learn, practice, generalize 2 sensory or mindfulness based self regulation skills for improved concentration in his meaningful life roles, routines, and relationships.  4) Discharge preparation: Will develop an aftercare / transition plan by discharge date  Area of Treatment Focus:  Symptom Management and Personal Safety    Area of Treatment Focus:  Symptom Management     Therapeutic Interventions/Treatment Strategies:  Support, Feedback, Safety Assessment, Problem Solving and Education    Response to Treatment Strategies:  Accepted Feedback, Gave Feedback, Listened, Focused on Goals, Attentive, Accepted Support and Alert    Name of Group: Group Therapy Date/Time: 12/3/2018 / 1300 to 1350; Group Participants: 6 of 7,     Description and Outcome:  Group therapy  Client reported depressed mood. He said  he had a video conference with his supervisor.  He said the supervisor was very supportive and was able to encourage him by saying his company is very open to supporting mental health issues. He said his supervisor is open to helping him ease back into work when ready which is a big relief.  Group members provided support.  Client discussed outpatient therapy needs and differences between ACT and DBT to determine which would be a better fit for him.  Group members helped problem solve.  No SI reported.  Client verbalized understanding of session content by problem solving with group members and accepting feedback.      Is this a Weekly Review of the Progress on the Treatment Plan?  No

## 2018-12-04 NOTE — ADDENDUM NOTE
Encounter addended by: Shea Fleming RN on: 12/4/2018  4:46 PM<BR>     Actions taken: Flowsheet accepted

## 2018-12-05 ENCOUNTER — HOSPITAL ENCOUNTER (OUTPATIENT)
Dept: BEHAVIORAL HEALTH | Facility: CLINIC | Age: 53
End: 2018-12-05
Attending: PSYCHIATRY & NEUROLOGY
Payer: COMMERCIAL

## 2018-12-05 PROCEDURE — H2012 BEHAV HLTH DAY TREAT, PER HR: HCPCS

## 2018-12-06 ENCOUNTER — HOSPITAL ENCOUNTER (OUTPATIENT)
Dept: BEHAVIORAL HEALTH | Facility: CLINIC | Age: 53
End: 2018-12-06
Attending: PSYCHIATRY & NEUROLOGY
Payer: COMMERCIAL

## 2018-12-06 PROCEDURE — H2012 BEHAV HLTH DAY TREAT, PER HR: HCPCS

## 2018-12-06 NOTE — PROGRESS NOTES
Adult Mental Health Outpatient Group Therapy Progress Note      Client Initial Individualized Goals for Treatment: be able to concentrate so can do my job, fix the depression so I can be happier and normal, don't want to spend all my time either working or sleeping/want a normal life, want to enjoy hobbies agian     See Initial Treatment suggestions for the client during the time between Diagnostic Assessment and completion of the Master Individualized Treatment Plan.     Treatment Goals:  Treatment Goals:  1) Safety:     Client will notify staff when needing assistance to develop or implement a coping plan to manage suicidal or self injurious urges.     Client will use coping plan for safety, as needed.  2) Symptom Management:     Abisai will process triggers in a self compassionate way and learn how to treat himself with kindness.    Abisai will Learn ways to process reactions to unhelpful comments and reframe his thought processes.  3) In Life Skills Abisai will:    Learn, practice, apply 2 skills/strategies for improved lifestyle balance with an emphasis on leisure and setting healthy boundaries for his time and energy.    Learn, practice, generalize 2 sensory or mindfulness based self regulation skills for improved concentration in his meaningful life roles, routines, and relationships.  4) Discharge preparation: Will develop an aftercare / transition plan by discharge date  Area of Treatment Focus:  Symptom Management and Personal Safety    Area of Treatment Focus:  Symptom Management     Therapeutic Interventions/Treatment Strategies:  Support, Feedback, Safety Assessment, Problem Solving and Education    Response to Treatment Strategies:  Accepted Feedback, Gave Feedback, Listened, Focused on Goals, Attentive, Accepted Support and Alert    Name of Group: Group Therapy Date/Time: 12/5/2018 / 1400 to 1450; Group Participants: 5 of 7,     Description and Outcome:  Group therapy  Client reported low mood.  He said he is  noticing less confusion in the morning which is good for him.  He said he is not sure if it will continue but will monitor and track.  Client said he is trying to lower his stress by using self-soothing activities and did play his guitar this week.  He said he has decided he wants to pursue DBT.  Writer provided him with referrals.  No SI reported.  Client verbalized understanding of session content by problem solving with group members and accepting feedback.    Is this a Weekly Review of the Progress on the Treatment Plan?  No

## 2018-12-06 NOTE — PROGRESS NOTES
Adult Mental Health Outpatient Group Therapy Progress Note     Client Initial Individualized Goals for Treatment: be able to concentrate so can do my job, fix the depression so I can be happier and normal, don't want to spend all my time either working or sleeping/want a normal life, want to enjoy hobbies again    See Initial Treatment suggestions for the client during the time between Diagnostic Assessment and completion of the Master Individualized Treatment Plan.    Treatment Goals:  Treatment Goals:  1.Client will notify staff when needing assistance to develop or implement a coping plan to manage suicidal or self injurious urges.  Client will use coping plan for safety, as needed.  2.When in life skills group Abisai will learn and practice 1-2 self compassion/coping strategies to help improve self esteem providing an update of progress weekly.   3.Will report on symptoms and identify skills to use to manage mood and anxiety.  4.Abisai would like to increase physical activity. Abisai will report weekly what he did to achieve this goal in the week prior.     Area of Treatment Focus:  Symptom Management    Therapeutic Interventions/Treatment Strategies:  Support, Feedback, Safety Assessments, Structured Activity and Education    Response to Treatment Strategies:  Accepted Feedback, Listened, Attentive, Accepted Support and Alert     Name of Group: Life Skills(1-2)  Group Participants: 5 of 7      Description and Outcome:  Client presented with low mood with good focus and concentration. Psychosocial skills addressed included a discussion related to stress management with a focus on resiliency and the concept of control. Goal #1 (no personal safety concerns reported or observed).  Client would benefit from additional opportunities to practice and implement content from this session  in every day life,relationships and mental health recovery.    Is this a Weekly Review of the Progress on the Treatment Plan?  Yes.      Are  Treatment Plan Goals being addressed?  Yes, continue treatment goals      Are Treatment Plan Strategies to Address Goals Effective?  Yes, continue treatment strategies      Are there any current contracts in place?  No

## 2018-12-06 NOTE — PROGRESS NOTES
Adult Mental Health Outpatient Group Therapy Progress Note      Client Initial Individualized Goals for Treatment: be able to concentrate so can do my job, fix the depression so I can be happier and normal, don't want to spend all my time either working or sleeping/want a normal life, want to enjoy hobbies agian     See Initial Treatment suggestions for the client during the time between Diagnostic Assessment and completion of the Master Individualized Treatment Plan.     Treatment Goals:  Treatment Goals:  1) Safety:     Client will notify staff when needing assistance to develop or implement a coping plan to manage suicidal or self injurious urges.     Client will use coping plan for safety, as needed.  2) Symptom Management:     Abisai will process triggers in a self compassionate way and learn how to treat himself with kindness.    Abisai will Learn ways to process reactions to unhelpful comments and reframe his thought processes.  3) In Life Skills Abisai will:    Learn, practice, apply 2 skills/strategies for improved lifestyle balance with an emphasis on leisure and setting healthy boundaries for his time and energy.    Learn, practice, generalize 2 sensory or mindfulness based self regulation skills for improved concentration in his meaningful life roles, routines, and relationships.  4) Discharge preparation: Will develop an aftercare / transition plan by discharge date  Area of Treatment Focus:  Symptom Management and Personal Safety    Area of Treatment Focus:  Symptom Management     Therapeutic Interventions/Treatment Strategies:  Support, Feedback, Safety Assessment, Problem Solving and Education    Response to Treatment Strategies:  Accepted Feedback, Gave Feedback, Listened, Focused on Goals, Attentive, Accepted Support and Alert    Name of Group: Group Therapy Date/Time: 12/6/2018 / 1300 to 1350; Group Participants: 4 of 7,     Description and Outcome:  Group therapy  Client reported depressed mood.  He said  "he started having more paranoia.  He said today he noticed a car parked down the street and started thinking it was his insurance company checking on him.  Discussed what that means to him and he was able to acknowledge that he thinks he should be better by now and worries he is \"faking\".  Discussed the time he has spent denying his symptoms compared to the time he has spent acknowledging there is something wrong and trying to do something about it.  He said he has spent many years denying his symptoms.  Writer validated that is would cause distress and some paranoia when he just cannot go back to denial.  Discussed acceptance and the daily acknowledgment of symptoms as part of recovery.  He agreed to try.  No change in SI reported.  Client verbalized understanding of session content by problem solving with group members and accepting feedback.    Is this a Weekly Review of the Progress on the Treatment Plan?  Yes.        Are Treatment Plan Goals being addressed?  Yes, client discharged.        Are Treatment Plan Strategies to Address Goals Effective?  Yes, Client discharged     Are there any current contracts in place?  No    "

## 2018-12-07 NOTE — ADDENDUM NOTE
Encounter addended by: Shea Fleming RN on: 12/7/2018  3:51 PM<BR>     Actions taken: Flowsheet accepted

## 2018-12-07 NOTE — PROGRESS NOTES
Adult Mental Health Outpatient Group Therapy Progress Note     Client Initial Individualized Goals for Treatment: be able to concentrate so can do my job, fix the depression so I can be happier and normal, don't want to spend all my time either working or sleeping/want a normal life, want to enjoy hobbies again    See Initial Treatment suggestions for the client during the time between Diagnostic Assessment and completion of the Master Individualized Treatment Plan.    Treatment Goals:  Treatment Goals:  1.Client will notify staff when needing assistance to develop or implement a coping plan to manage suicidal or self injurious urges.  Client will use coping plan for safety, as needed.  2.When in life skills group Abisai will learn and practice 1-2 self compassion/coping strategies to help improve self esteem providing an update of progress weekly.   3.Will report on symptoms and identify skills to use to manage mood and anxiety.  4.Abisai would like to increase physical activity. Abisai will report weekly what he did to achieve this goal in the week prior.     Area of Treatment Focus:  Symptom Management    Therapeutic Interventions/Treatment Strategies:  Support, Feedback, Safety Assessments, Structured Activity and Education    Response to Treatment Strategies:  Accepted Feedback, Listened, Attentive, Accepted Support and Alert     Name of Group: Life Skills(2-3)  Group Participants: 4 of 6      Description and Outcome:  Client presented with low mood with good focus and concentration. Psychosocial skills addressed included a discussion related to stress management with a focus on resiliency and the concept of creativity as a way to solve problems and manage stress. Goal #1 (no personal safety concerns reported or observed).  Client would benefit from additional opportunities to practice and implement content from this session  in every day life,relationships and mental health recovery.    Is this a Weekly Review of the  Progress on the Treatment Plan?  Yes.      Are Treatment Plan Goals being addressed?  Yes, continue treatment goals      Are Treatment Plan Strategies to Address Goals Effective?  Yes, continue treatment strategies      Are there any current contracts in place?  No

## 2018-12-12 ENCOUNTER — TELEPHONE (OUTPATIENT)
Dept: BEHAVIORAL HEALTH | Facility: CLINIC | Age: 53
End: 2018-12-12

## 2018-12-12 NOTE — DISCHARGE SUMMARY
Adult Mental Health Intensive Outpatient Discharge Summary/Instructions      Patient: Dash Arizmendi MRN: [id number]   : 1965 Age: 53 year old Sex: male     Admission Date: 18  Discharge Date: 18  Diagnosis:  Bipolar I disorder, depressed. Panic disorder without agoraphobia. Generalized anxiety disorder.     Focus of Treatment / Progress    Personal Safety:      * Follow your safety plan     * Call crisis lines as needed:    [facility] [place] [phone] [facility] [phone]  [facility] [phone] Crisis Connection [phone]  [facility] [phone] [facility] COPE [phone]  [facility] [phone] National Suicide Prevention [phone]  [facility] [phone] Suicide Prevention [phone]  [facility] [phone]    Managing symptoms of:  Bipolar and anxiety    Community support/health:      Managing Symptoms and Preventing Relapse    * Go to all of your appointments    * Take all medications as directed.      * Carry a current list if medication with you    * Do not use illicit (street) drugs.  Avoid alcohol    * Report these symptoms to your care team. These are early signs of relapse:   Thoughts of suicide   Losing more sleep   Increased confusion   Mood getting worse   Feeling more aggressive    *Use these skills daily:      Copy of summary sent to: In EPIC    Follow up with psychiatrist / main caregiver: Gayatri Meléndez                    Next visit: ***    Follow up with your therapist: ***                                                Next visit: ***    Go to group therapy and / or support groups at:     See your medical doctor about:  ***    Other:  ***    Client Signature:_______________________  Date / Time:___________  Staff Signature:________________________   Date / Time:___________

## 2018-12-12 NOTE — DISCHARGE SUMMARY
"       Adult Mental Health Intensive Outpatient Discharge Summary/Instructions      Patient: Dash Arizmendi MRN: [id number]   : 1965 Age: 53 year old Sex: male     Admission Date: 18  Discharge Date: 18  Diagnosis:   Bipolar I disorder, depressed. Panic disorder without agoraphobia. Generalized anxiety disorder.       Focus of Treatment / Progress    Personal Safety: History of thoughts about suicide     * Follow your safety plan     * Call crisis lines as needed:    [facility] [phone] [facility] [phone]  [facility] [phone] Crisis Connection [phone]  [facility] [phone] [facility] COPE [phone]  [facility] [phone] National Suicide Prevention [phone]  [facility] [phone] Suicide Prevention [phone]  [facility] [phone]    Managing symptoms of:  Bipolar and anxiety    Community support/health:      Managing Symptoms and Preventing Relapse    * Go to all of your appointments    * Take all medications as directed.      * Carry a current list if medication with you    * Do not use illicit (street) drugs.  Avoid alcohol    * Report these symptoms to your care team. These are early signs of relapse:   Thoughts of suicide   Losing more sleep   Increased confusion   Mood getting worse   Feeling more aggressive      *Use these skills daily:  Talk to someone you trust at least one time weekly, set boundaries and say \"no\", be assertive, act opposite of negative feelings, accept challenges with a positive attitude, exercise at least three times per week for 30 minutes, get enough sleep, eat healthy foods, get into a good routine    Copy of summary sent to: In Epic and mailed to client    Follow up with psychiatrist / main caregiver: Dutch Randall (DBT)            Next visit:  19/ ,18    Follow up with your therapist: Dr Marni Suarez           Next visit: 19    Go to group therapy and / or support " groups at: DBT,  [facility] support group, [facility]( [facility]ipolar [facility] [place])    See your medical doctor about:  As needed    Other:  Your treatment team appreciates having the opportunity to work with you and wishes you the best.    Client Signature:_______________________  Date / Time:___________  Staff Signature:________________________   Date / Time:___________

## 2018-12-12 NOTE — TELEPHONE ENCOUNTER
This therapist spoke briefly to client in regards to his earlier message today requesting discharge from this program because of starting DBT. Gathered information related to his next scheduled medical appointments. Client sounded stable and calm on the phone.

## 2018-12-19 NOTE — DISCHARGE SUMMARY
Adult Mental Health Intensive Outpatient Discharge Summary/Instructions      Patient: Dash Arizmendi MRN: [id number]   : 1965 Age: 53 year old Sex: male     Admission Date: 10/29/18  Discharge Date: 18  Diagnosis:   Bipolar I disorder, depressed.  Panic disorder without agoraphobia.  Generalized anxiety disorder.     Focus of Treatment / Progress    Personal Safety:      * Follow your safety plan     * Call crisis lines as needed:    [facility] [place] [phone] [facility] [phone]  [facility] [phone] Crisis Connection [phone]  [facility] [phone] [facility] COPE [phone]  [facility] [phone] National Suicide Prevention [phone]  [facility] [phone] Suicide Prevention [phone]  [facility] [phone]    Managing symptoms of:  depressed mood, feeling of hopelessness, feeling of worthlessness, irritability, low interest in usual activities, thinking it would be better to die, constant worry, poor concentration, trouble remembering things, excessive sleep that does not feel restful, low motivation, low energy, having to check/recheck what has already been done, hearing muffled sounds or a bell (knows that it is not real), nightmares    Community support/health:    [place] [employer] Illness (www.swapna.org): 537.847.1815 or 000-772-2470.   Mental Health Association (www.mentalhealth.org): 407.640.6605 or 833-413-2165.  [place] Crisis Text Line: Text [place] to 372407  Suicide LifeLine Chat: suicidepreventionlifeline.org/chat    Managing Symptoms and Preventing Relapse    * Go to all of your appointments    * Take all medications as directed.      * Carry a current list if medication with you    * Do not use illicit (street) drugs.  Avoid alcohol    * Report these symptoms to your care team. These are early signs of relapse:   Thoughts of suicide   Losing more sleep   Increased confusion   Mood getting worse   Feeling more  aggressive       *Use these skills daily:  Mindfulness, practice self-compassion, practice authenticity in making choices, maintain balance in schedule (time for self and others, time for relaxation and time for activities, time for leisure and time for tasks, time at home and time out of the house), assert needs and set limits with others as needed, practice intentional physical relaxation, challenge negative thoughts/use affirming and encouraging self-talk, engage with support people on regular basis, practice good self-care (sleep hygiene, balanced eating, regular rest, take care of medical needs, maintain personal hygiene, self-nurturing activities), acknowledge and accept your feelings    Copy of summary sent to: In EPIC    Follow up with psychiatrist / main caregiver:  Dr. Suarez, [facility]   Next visit: to be scheduled by patient    Follow up with your therapist:    Next visit: to be scheduled by patient    Go to group therapy and / or support groups at: Starting DBT therapy.     See your medical doctor about:  For a annual exam and any general concerns regarding health, wellness or illness as needed.    Other:  Your treatment team appreciates having the opportunity to work with you and wishes you the best. If you have any questions or concerns about your discharge plan please contact day treatment at [phone].      Client Signature:_______________________  Date / Time:___________      Staff Signature:________________________   Date / Time:___________

## 2021-10-05 ENCOUNTER — TELEPHONE (OUTPATIENT)
Dept: BEHAVIORAL HEALTH | Facility: CLINIC | Age: 56
End: 2021-10-05

## 2021-10-05 NOTE — TELEPHONE ENCOUNTER
10/5/21 Received call from Pt requesting a DA for the 55+ Program. Pt stated he have taken the Adult Day [place] program in 2019. Scheduled for 10/11/21. EMILY

## 2021-10-11 ENCOUNTER — HOSPITAL ENCOUNTER (OUTPATIENT)
Dept: BEHAVIORAL HEALTH | Facility: CLINIC | Age: 56
Discharge: HOME | End: 2021-10-11
Attending: FAMILY MEDICINE | Admitting: FAMILY MEDICINE
Payer: COMMERCIAL

## 2021-10-11 PROCEDURE — 90791 PSYCH DIAGNOSTIC EVALUATION: CPT | Mod: GT,95 | Performed by: COUNSELOR

## 2021-10-11 RX ORDER — QUETIAPINE 400 MG/1
25 TABLET, FILM COATED, EXTENDED RELEASE ORAL
COMMUNITY
End: 2021-11-15

## 2021-10-11 RX ORDER — LITHIUM CARBONATE 300 MG/1
900 TABLET, FILM COATED, EXTENDED RELEASE ORAL AT BEDTIME
COMMUNITY

## 2021-10-11 ASSESSMENT — ANXIETY QUESTIONNAIRES
6. BECOMING EASILY ANNOYED OR IRRITABLE: NEARLY EVERY DAY
7. FEELING AFRAID AS IF SOMETHING AWFUL MIGHT HAPPEN: MORE THAN HALF THE DAYS
GAD7 TOTAL SCORE: 14
GAD7 TOTAL SCORE: 14
2. NOT BEING ABLE TO STOP OR CONTROL WORRYING: MORE THAN HALF THE DAYS
3. WORRYING TOO MUCH ABOUT DIFFERENT THINGS: MORE THAN HALF THE DAYS
GAD7 TOTAL SCORE: 14
5. BEING SO RESTLESS THAT IT IS HARD TO SIT STILL: NOT AT ALL
1. FEELING NERVOUS, ANXIOUS, OR ON EDGE: NEARLY EVERY DAY
8. IF YOU CHECKED OFF ANY PROBLEMS, HOW DIFFICULT HAVE THESE MADE IT FOR YOU TO DO YOUR WORK, TAKE CARE OF THINGS AT HOME, OR GET ALONG WITH OTHER PEOPLE?: VERY DIFFICULT
7. FEELING AFRAID AS IF SOMETHING AWFUL MIGHT HAPPEN: MORE THAN HALF THE DAYS
4. TROUBLE RELAXING: MORE THAN HALF THE DAYS

## 2021-10-11 ASSESSMENT — COLUMBIA-SUICIDE SEVERITY RATING SCALE - C-SSRS
4. HAVE YOU HAD THESE THOUGHTS AND HAD SOME INTENTION OF ACTING ON THEM?: NO
TOTAL  NUMBER OF ABORTED OR SELF INTERRUPTED ATTEMPTS PAST 3 MONTHS: NO
ATTEMPT LIFETIME: NO
6. HAVE YOU EVER DONE ANYTHING, STARTED TO DO ANYTHING, OR PREPARED TO DO ANYTHING TO END YOUR LIFE?: NO
5. HAVE YOU STARTED TO WORK OUT OR WORKED OUT THE DETAILS OF HOW TO KILL YOURSELF? DO YOU INTEND TO CARRY OUT THIS PLAN?: NO
3. HAVE YOU BEEN THINKING ABOUT HOW YOU MIGHT KILL YOURSELF?: YES
1. IN THE PAST MONTH, HAVE YOU WISHED YOU WERE DEAD OR WISHED YOU COULD GO TO SLEEP AND NOT WAKE UP?: NO
1. IN THE PAST MONTH, HAVE YOU WISHED YOU WERE DEAD OR WISHED YOU COULD GO TO SLEEP AND NOT WAKE UP?: NO
TOTAL  NUMBER OF INTERRUPTED ATTEMPTS LIFETIME: NO
6. HAVE YOU EVER DONE ANYTHING, STARTED TO DO ANYTHING, OR PREPARED TO DO ANYTHING TO END YOUR LIFE?: NO
5. HAVE YOU STARTED TO WORK OUT OR WORKED OUT THE DETAILS OF HOW TO KILL YOURSELF? DO YOU INTEND TO CARRY OUT THIS PLAN?: NO
2. HAVE YOU ACTUALLY HAD ANY THOUGHTS OF KILLING YOURSELF LIFETIME?: YES
2. HAVE YOU ACTUALLY HAD ANY THOUGHTS OF KILLING YOURSELF?: NO
TOTAL  NUMBER OF INTERRUPTED ATTEMPTS PAST 3 MONTHS: NO
TOTAL  NUMBER OF ABORTED OR SELF INTERRUPTED ATTEMPTS PAST LIFETIME: NO
ATTEMPT PAST THREE MONTHS: NO
4. HAVE YOU HAD THESE THOUGHTS AND HAD SOME INTENTION OF ACTING ON THEM?: NO

## 2021-10-11 ASSESSMENT — PATIENT HEALTH QUESTIONNAIRE - PHQ9
10. IF YOU CHECKED OFF ANY PROBLEMS, HOW DIFFICULT HAVE THESE PROBLEMS MADE IT FOR YOU TO DO YOUR WORK, TAKE CARE OF THINGS AT HOME, OR GET ALONG WITH OTHER PEOPLE: EXTREMELY DIFFICULT
SUM OF ALL RESPONSES TO PHQ QUESTIONS 1-9: 22
SUM OF ALL RESPONSES TO PHQ QUESTIONS 1-9: 22

## 2021-10-11 NOTE — TELEPHONE ENCOUNTER
----- [facility] sent at 10/11/2021 12:01 PM CDT -----  Scheduling Request    Patient Name: Dash Arizmendi  Location of programming: Virtual  Start Date: 10/14/2021  Group: A-2 TRACK 55+  [facility]. [id number]  9AM - 12PM  LAQUITA, W, TH   Attending Provider (MD): Leander  Number of visits to be scheduled: 36  Duration of Appointment in minutes: 180  Visit Type: Zoom - [id number]

## 2021-10-11 NOTE — PATIENT INSTRUCTIONS
Welcome to the Adult Mental Health Outpatient Programs. Thank you for choosing us at [facility]!     Managing mental health symptoms while balancing life stressors can be a struggle. Our mental health programming will provide you the therapy, education, skills and support needed to improve your well-being and to live a healthy and more manageable lifestyle.     After completing the Diagnostic Assessment, you will receive a recommendation for a level of care or specialty service that is right for you. Our Outpatient Mental Health programs are all group-based and allow you to meet with peers who are trying to manage similar symptoms or life circumstances in a safe and therapeutic setting.     Program Recommendations for: 55+ Outpatient Program    You will be scheduled to join the following program: 55+ Outpatient Program    You will be in the follow group /track:  A-2    Please attend:  Mondays, Wednesdays, and Thursdays     at:  9am-12pm    Start date: 10/14/2021     What will happen next?      You will receive a series of calls from our [facility] providers and/or schedulers to prepare you for your program participation.       Admission Call: Program staff will contact you after your diagnostic assessment to provide a brief check in and to complete the admission process. We will ask you about concerns that may need to be addressed right away. This could include: personal safety, insurance clarification, technology access, or another concern you may have. This call may occur days in advance or right before your first scheduled group.       Physical Health Screen Call:  A nurse will contact you either prior to admission or during your first week in the program to ask a few required physical health screening questions and discuss concerns as needed.      Provider/Scheduling Call: Program staff may also call to schedule an individual appointment with a psychiatrist or psychologist (therapist). This will  depend on your needs and may be required for the program that was recommended for you. This program requirement does NOT replace your follow up with your community provider.  However, it is important that you do NOT see your community psychiatric provider on the same day that you see the program psychiatrist. If you do, this could result in a denial from your insurance. Please let us know if you have any concerns and we will help you.      Disability or FMLA paperwork requests: Please coordinate with your community provider to complete paperwork. This will be easiest for you. Most of the time, they want the same provider to follow you during your time off. If you do not have a community provider, please schedule an appointment with one as soon as possible. The Partial Hospitalization Program provider may assist with paperwork if you have not already established care in the community. However, this is a short-term program and responsibility for paperwork must transfer to another provider when you discharge from the Partial Hospitalization Program. We do NOT have a provider to complete paperwork in Adult Day Treatment, Adult Dual Disorder Program or 55+ Program at this time.      WAIT LIST: If you are placed on the Wait List following your diagnostic assessment, you will receive a phone call within a few days to discuss a tentative start date and plan.  Please contact us at the phone number listed below at any time with additional questions or if you are choosing to be removed from the Wait List.      What if I still have questions or cannot attend as planned? :  55+ Outpatient Program [phone].     We hope to be able to answer your questions during the admission call. You can also reach out to us by contacting the program directly at: 55+ Outpatient Program [phone].  Sincerely,   Your Outpatient Adult Mental Health Program Team     SAFETY PLAN:     Step 1: Warning signs / cues (Thoughts, images, mood,  "situation, behavior) that a crisis may be developing:     ? Thoughts: \"I don't matter\", \"People would be better off without me\" and \"I'm a burden\"  ? Images: obsessive thoughts of death or dying: \"I don't think i'm going to die, but I don't think that i'm going to live\"  ? Thinking Processes: ruminations (can't stop thinking about my problems), racing thoughts, intrusive thoughts (bothersome, unwanted thoughts that come out of nowhere) and highly critical and negative thoughts.  ? Mood: worsening depression, hopelessness and helplessness  ? Behaviors: isolating/withdrawing , not taking care of myself, not taking care of my responsibilities and not sleeping enough  ? Situations: changes in symptoms: \"I think i'm just in the horrible loop of my mental health\"   ?    Step 2: Coping strategies - Things I can do to take my mind off of my problems without contacting another person (relaxation technique, physical activity):     ? Distress Tolerance Strategies:  Sleep and play with pet  ? Physical Activities: go for a walk  ? Focus on helpful thoughts:  \"I will get through this\"     Step 3: People and social settings that provide distraction:                 Wife and Son              Talk          Step 4: Remind myself of people and things that are important to me and worth living for:  Wife and Son      Step 5: When I am in crisis, I can ask these people to help me use my safety plan:                 Saima Das (spouse) [phone]     Step 6: Making the environment safe:      ? be around others     Step 7: Professionals or agencies I can contact during a crisis:     ? Suicide Prevention Lifeline: 0-947-722-TALK (5222)  ? Crisis Text Line Service (available 24 hours a day, 7 days a week): Text [place] to 791466  ? Call  **CRISIS (889767) from a cell phone to talk to a team of professionals who can help you.          Crisis Services By [facility]: Phone Number:   [place]     [phone]   [place]    [phone]   Keshav" [phone]   [place]    [phone]   [place]    [phone]   [place] [phone]   [place]     [phone]      ? Call 911 or go to my nearest emergency department.             I helped develop this safety plan and agree to use it when needed.  I have been given a copy of this plan.          Today s date:  10/11/2021  Adapted from Safety Plan Template 2008 Delicia Palma and Piter Kohli is reprinted with the express permission of the authors.  No portion of the Safety Plan Template may be reproduced without the express, written permission.  You can contact the authors at [email or web address] or [email or web address]

## 2021-10-11 NOTE — PROGRESS NOTES
"[facility]  Provider Name:  Tigre Stephen, [facility]      PATIENT'S NAME: Dash Arizmendi  PREFERRED NAME: Aren   PRONOUNS:   He/him    MRN: [id number]  : 1965  ADDRESS: [address] [address]  ACCT. NUMBER:  [id number]  DATE OF SERVICE: 10/11/21  START TIME: 11:00am   END TIME: 12:04pm   PREFERRED PHONE: [phone]  May we leave a program related message: Yes  SERVICE MODALITY:  Video Visit:      Provider verified identity through the following two step process.  Patient provided:  Patient  and Patient address    Telemedicine Visit: The patient's condition can be safely assessed and treated via synchronous audio and visual telemedicine encounter.      Reason for Telemedicine Visit: Services only offered telehealth    Originating Site (Patient Location): Patient's home    Distant Site (Provider Location): [facility]    Consent:  The patient/guardian has verbally consented to: the potential risks and benefits of telemedicine (video visit) versus in person care; bill my insurance or make self-payment for services provided; and responsibility for payment of non-covered services.     Patient would like the video invitation sent by:  My Chart    Mode of Communication:  Video Conference via Amwell    As the provider I attest to compliance with applicable laws and regulations related to telemedicine.    UNIVERSAL ADULT Mental Health DIAGNOSTIC ASSESSMENT    Identifying Information:  Patient is a 56 year old, CaucasianCaucasian.  The pronoun use throughout this assessment reflects the patient's chosen pronoun.  Patient was referred for an assessment by primary care providerprimary care provider.  Patient attended the session alone.     Chief Complaint:   The reason for seeking services at this time is: \"Depression, anxiety, learning to live with medication side effects (specifically tremors, " [facility]  Consult Note  Pathology    SUBJECTIVE:     Reason for Consultation:  Ms. Toth is a 32 y.o. female with PMH of SLE/APS with complicated pregnancy (delivered @ 27 wks baby girl on 3/20) who presents with acute onset left leg weakness and left T4 sensory level on 3/16, with 3/19 MRI showing mid C4- C7 transverse myelitis. Started on stress dose steroids and subsequently transferred to [facility] for further management. Neurology concerned for NMO given the clinical presentation and MRI findings, and would like to initate daily TPE x 5 every other day.    Acute management of NMO carries a Category II Grade 1B indication for therapeutic plasma exchange (TPE) via the 2016 Journal of Clinical Apheresis Guidelines (Sanders J et al. Journal of Clinical Apheresis 2016; 31:149-162.)     Per today's note, a triple lumen dialysis grade catheter was placed at [facility] without complications.  We will proceed with first TPE starting today, and will schedule subsequent treatments for Thurs/Sat/Mon/Wed.       Pertinent Medications: None contraindicated for TPE.    methylPREDNISolone sodium succinate (SOLU-MEDROL) 1,000 mg in dextrose 5 % 100 mL IVPB    Review of patient's allergies indicates:  Not on File    OBJECTIVE:     Pertinent Laboratory Data:   Complete Blood Count:   Lab Results   Component Value Date    HGB 6.6 (L) 03/21/2017    HCT 20.3 (L) 03/21/2017     03/21/2017    WBC 14.63 (H) 03/21/2017     Comprehensive Metabolic Panel:   Lab Results   Component Value Date     (L) 03/21/2017    K 4.1 03/21/2017     03/21/2017    CO2 19 (L) 03/21/2017     (H) 03/21/2017    BUN 29 (H) 03/21/2017    CREATININE 1.5 (H) 03/21/2017    CALCIUM 8.0 (L) 03/21/2017    PROT 7.0 03/21/2017    ALBUMIN 2.3 (L) 03/21/2017    BILITOT 0.2 03/21/2017    ALKPHOS 55 03/21/2017    AST 15 03/21/2017    ALT 7 (L) 03/21/2017    ANIONGAP 10 03/21/2017    EGFRNONAA 45.8 (A) 03/21/2017     Procedure:  "tinnitus, and \"brain fog\")\".  The problem(s) began 12/29/19.    Patient has attempted to resolve these concerns in the past through Therapy, Medication, PHP, Day Bridge, IOP and DBT. .    Social/Family History:  Patient reported they grew up in [place], [place].  They were raised by biological mother  .  Parents .  Patient reported that their childhood was \"I remember it from telling stories, but I don't have good clear memories from my childhood. My mother was a single parent and I was the oldest so a lot of responsibility fell on me. Once in high school I tried to stay away from home as much as I could. I would try to be at friends houses where I could just be a kid\".  Patient described their current relationships with family of origin as \"No relationship no contact\".     Patient did not identify Latter-day, ethnic or cultural issues relevant to therapy at this time. These factors will be addressed in the Preliminary Treatment plan. Patient identified their preferred language to be English. Patient reported they does not need the assistance of an  or other support involved in therapy.     Patient reported had no significant delays in developmental tasks.   Patient's highest education level was some college  .  Patient identified the following learning problems:\" speech in the first grade because I couldn't say the letter R\".  Modifications will not be used to assist communication in therapy.  Patient reports they are  able to understand written materials.    Patient reported the following relationship history .  Patient's current relationship status is  for 20 years.   Patient identified their sexual orientation as heterosexual.  Patient reported having 1 child(bree). Patient identified therapist;spouse as part of their support system.  Patient identified the quality of these relationships as fair,  .      Patient's current living/housing situation involves staying in own home/apartment.  " noncontrast MRI of the cervical spine    Technique: sagittal T1, T2, STIR and axial T2 and gradient images of the cervical spine without contrast.         Comparison: None    Findings: There is reversal of the normal cervical lordosis centered at the C4/C5 level. There is degenerative disc disease with disc desiccation and mild endplate degenerative change. On for degenerative change of the cervical vertebral body heights and contours are within normal limits without evidence for acute fracture. Craniocervical junction within normal limits. Cerebellar tonsils are appropriately located.    There is a long segment region of edema signal and enhancement in the central right aspect of the cervical spinal cord extending from mid C4 through mid C7 which measures approximately 4.6 cm in length. This is nonspecific and primarily concerning for focus of myelitis which may be inflammatory or infectious. Cord infarction felt to be less likely in light of configuration.  Evaluation somewhat limited by lack of contrast with gravid status.      C2/C3: No significant disc bulge, central canal or neural foraminal stenosis..    C3/C4: Bulging disc with partial effacement of ventral thecal sac without significant central canal or neuroforaminal stenosis..    C4/C5: Posterior disc osteophyte with effacement of ventral thecal sac with mild central canal stenosis without significant neural foraminal stenosis..    C5/C6: Posterior disc osteophyte with mild central canal stenosis without significant neural foraminal stenosis..    C6/C7: No significant disc bulge, central canal or neural foraminal stenosis..    C7/T1: No significant disc bulge, central canal or neural foraminal stenosis..        Case was discussed with Dr. Gonzalez and Dr. Sandy at 16:48:39 on 03/19/17       Impression       Abnormal cord signal edema and expansion in the central right aspect of the cord extending from mid C4 through mid C7. While nonspecific primarily  The immediate members of family and household include Car Das, 56,wife  and they report that housing is stable.    Patient is currently disabled.  Patient reports their finances are obtained through disability;[employer]DI disability. Patient does identify finances as a current stressor.      Patient reported that they have not been involved with the legal system.    . Patient does not report being under probation/ parole/ jurisdiction. They are not under any current court jurisdiction. .    Patient's Strengths and Limitations:  Patient identified the following strengths or resources that will help them succeed in treatment: family support. Things that may interfere with the patient's success in treatment include: none identified.     Personal and Family Medical History:  Patient does report a family history of mental health concerns.  Patient reports family history includes Mental Illness in his mother., Great aunt was institutionalized her entire life, believe it was schizophrenia.     Patient does report Mental Health Diagnosis and/or Treatment.  Patient Patient reported the following previous diagnoses which include(s): an Anxiety Disorder, a Bipolar Disorder and Depression.  Patient reported symptoms began Bipolar-1995, Anxiety-2003, depression-20185.   Patient has received mental health services in the past: therapy with, day treatment with [facility], psychiatry with [facility].  and partial hospitalization program [facility].  Psychiatric Hospitalizations: None.  Patient denies a history of civil commitment.  Patient is receiving other mental health services.  These include psychotherapy with Donn Mosley.  and psychiatry with Dr. Addison was my therapist and she went on leave. Waiting to be assigned a Psychiatris.  Next appointment: 12/08/2021.         Patient has had a physical exam to rule out medical causes for current symptoms.  Date of last physical exam was within the past year. Symptoms have developed  concerning for inflammatory/infectious myelitis. Cord infarction remains in the differential although felt less likely in light of configuration.    No evidence for cord hemorrhage.    Clinical correlation and followup advised      Electronically signed by: ROZINA GOLDSTEIN DO  Date: 03/19/17  Time: 16:51      Lumbar Puncture pending    NMO-IgG in serum and CSF pending    ASSESSMENT/PLAN:     Access: Trial[facility] RIJ    Number of Procedures: 5    Schedule: Today (3/21), Thurs, Sat, Mon, and Wed.    Volume: 4.5    Replacement Fluid: Albumin    Recommended Laboratory Studies: Complete Blood Count     Ulisses Godoy MD, MS, FASCP  Section of Transfusion Medicine & Blood Bank  Department of Pathology and Laboratory Medicine  [facility]  [phone] (Blood Bank Offices)  03/21/2017         since last physical exam and client was encouraged to follow up with PCP.  . The patient has a non-[place] Primary Care Provider. Their PCP is [facility]..  Patient reports no current medical concerns.  Patient denies any issues with pain..   There are significant appetite / nutritional concerns / weight changes.   Patient does not report a history of head injury / trauma / cognitive impairment.      Patient reports current meds as:   Outpatient Medications Marked as Taking for the 10/11/21 encounter (Hospital Encounter) with [facility]   Medication Sig     cariprazine (VRAYLAR) 3 MG CAPS capsule Take by mouth daily     levomilnacipran (FETZIMA ER) 40 MG 24 hr capsule Take by mouth daily     lithium ER (LITHOBID) 300 MG CR tablet Take 900 mg by mouth 2 times daily     QUEtiapine ER (SEROQUEL XR) 400 MG 24 hr tablet Take 25 mg by mouth once as needed       Medication Adherence:  Patient reports taking.  taking prescribed medications as prescribed.    Patient Allergies:    Allergies   Allergen Reactions     Pollen Extract      Powder Scent Fragrance      Risperidone Dizziness       Medical History:    Past Medical History:   Diagnosis Date     Depressive disorder      Uncomplicated asthma          Current Mental Status Exam:   Appearance:  Appropriate    Eye Contact:  Good   Psychomotor:  Normal       Gait / station:  no problem  Attitude / Demeanor: Cooperative   Speech      Rate / Production: Normal/ Responsive      Volume:  Normal  volume      Language:  no problems  Mood:   Normal  Affect:   Appropriate    Thought Content: Clear   Thought Process: Coherent       Associations: No loosening of associations  Insight:   Good   Judgment:  Intact   Orientation:  All  Attention/concentration: Good    Rating Scales:    PHQ9:    PHQ-9 SCORE 10/25/2018 10/29/2018 10/11/2021   PHQ-9 Total Score MyChart - - 22 (Severe depression)   PHQ-9 Total Score 8 9 22       GAD7:    JUAN PABLO-7 SCORE 10/25/2018 10/29/2018  10/11/2021   Total Score - - 14 (moderate anxiety)   Total Score 4 6 14     CGI:     First:Considering your total clinical experience with this particular patient population, how severe are the patient's symptoms at this time?: 5 (10/11/2021 11:00 AM)      Most recentCompared to the patient's condition at the START of treatment, this patient's condition is: 4 (10/11/2021 11:00 AM)      Substance Use:  Patient did not report a family history of substance use concerns; see medical history section for details.  Patient has not received chemical dependency treatment in the past.  Patient has not ever been to detox.      Patient is not currently receiving any chemical dependency treatment.           Substance History of use Age of first use Date of last use     Pattern and duration of use (include amounts and frequency)   Alcohol used in the past   04/20/1983 08/27/19 REPORTS SUBSTANCE USE: N/A   Cannabis   used in the past 01/02/2018 04/01/18 REPORTS SUBSTANCE USE: N/A     Amphetamines   never used     REPORTS SUBSTANCE USE: N/A   Cocaine/crack    never used       REPORTS SUBSTANCE USE: N/A   Hallucinogens never used         REPORTS SUBSTANCE USE: N/A   Inhalants never used         REPORTS SUBSTANCE USE: N/A   Heroin never used         REPORTS SUBSTANCE USE: N/A   Other Opiates never used     REPORTS SUBSTANCE USE: N/A   Benzodiazepine   never used     REPORTS SUBSTANCE USE: N/A   Barbiturates never used     REPORTS SUBSTANCE USE: N/A   Over the counter meds never used     REPORTS SUBSTANCE USE: N/A   Caffeine never used     REPORTS SUBSTANCE USE: N/A   Nicotine  never used     REPORTS SUBSTANCE USE: N/A   Other substances not listed above:  Identify:  never used     REPORTS SUBSTANCE USE: N/A     Patient reported the following problems as a result of their substance use: no problems, not applicable.     CAGE- AID:    CAGE-AID Total Score 10/11/2021   Total Score 0   Total Score MyChart 0 (A total score of 2 or greater  is considered clinically significant)       Substance Use: No symptoms    Based on the positive CAGE score and clinical interview there  are not indications of drug or alcohol abuse.      Significant Losses / Trauma / Abuse / Neglect Issues:   Patient did not  serve in the .  There are indications or report of significant loss, trauma, abuse or neglect issues related to: are no indications and client denies any losses, trauma, abuse, or neglect concerns.  Concerns for possible neglect are not present.     Safety Assessment:   Current Safety Concerns:  Schulter Suicide Severity Rating Scale (Lifetime/Recent)  Schulter Suicide Severity Rating (Lifetime/Recent) 10/11/2021   1. Wish to be Dead (Lifetime) No   1. Wish to be Dead (Recent) No   2. Non-Specific Active Suicidal Thoughts (Lifetime) Yes   2. Non-Specific Active Suicidal Thoughts (Recent) No   3. Active Suicidal Ideation with any Methods (Not Plan) Without Intent to Act (Lifetime) Yes   3. Active Suicidal Ideation with any Methods (Not Plan) Without Intent to Act (Recent) No   4. Active Suicidal Ideation with Some Intent to Act, Without Specific Plan (Lifetime) No   4. Active Suicidal Ideation with Some Intent to Act, Without Specific Plan (Recent) No   5. Active Suicidal Ideation with Specific Plan and Intent (Lifetime) No   5. Active Suicidal Ideation with Specific Plan and Intent (Recent) No   Actual Attempt (Lifetime) No   Actual Attempt (Past 3 Months) No   Has subject engaged in non-suicidal self-injurious behavior? (Lifetime) Yes   Has subject engaged in non-suicidal self-injurious behavior? (Past 3 Months) Yes   Interrupted Attempts (Lifetime) No   Interrupted Attempts (Past 3 Months) No   Aborted or Self-Interrupted Attempt (Lifetime) No   Aborted or Self-Interrupted Attempt (Past 3 Months) No   Preparatory Acts or Behavior (Lifetime) No   Preparatory Acts or Behavior (Past 3 Months) No     Patient denies current homicidal ideation and  behaviors.  Patient reports current self-injurious ideation.  Onset: 3 years ago , frequency: a few times a week, duration: 30 minutes, intensity: cutting enough that it bleeds for 10-15 minutes.  Client reports they are currently engaging in self-injurious behavior.  Self-injurious behaviors include: cutting.  Frequency of self-injurious behaviors: a few times a week.  Patient denied risk behaviors associated with substance use.  Patient denies any high risk behaviors associated with mental health symptoms.  Patient reports the following current concerns for their personal safety: None.  Patient reports there are not firearms in the house.       none.    History of Safety Concerns:  Patient denied a history of homicidal ideation.     Patient denied a history of personal safety concerns.    Patient denied a history of assaultive behaviors.    Patient denied a history of sexual assault behaviors.     Patient denied a history of risk behaviors associated with substance use.  Patient denies any history of high risk behaviors associated with mental health symptoms.  Patient reports the following protective factors: dedication to family or friends    Risk Plan:  See Recommendations for Safety and Risk Management Plan    Review of Symptoms per patient report:  Depression: Change in sleep, Lack of interest, Excessive or inappropriate guilt, Change in energy level, Difficulties concentrating, Change in appetite, Psychomotor slowing or agitation, Feelings of hopelessness, Feelings of helplessness, Low self-worth, Ruminations, Irritability, Feeling sad, down, or depressed, Withdrawn, Poor hygeine and Self-injurious behavior  Leonarda:  Elevated mood, Racing thoughts, Increased activity, Decreased need for sleep, Pressured speech and Restlessness  Psychosis: Auditory hallucinations and Visual hallucinations  Anxiety: Excessive worry, Nervousness, Sleep disturbance, Ruminations, Poor concentration and  "Irritability  Panic:  Palpitations, Tremors, Tingling, Numbness, Sense of impending doom, Hot or cold flashes and Triggers \"When I start thinking about my physical health, like haveing a lab test\"  Post Traumatic Stress Disorder:  No Symptoms   Eating Disorder: No Symptoms  ADD / ADHD:  No symptoms  Conduct Disorder: No symptoms  Autism Spectrum Disorder: No symptoms  Obsessive Compulsive Disorder: No Symptoms    Patient reports the following compulsive behaviors and treatment history: Hair Pulling - has not had treatment..      Diagnostic Criteria:   A. Excessive anxiety and worry about a number of events or activities (such as work or school performance).    - Restlessness or feeling keyed up or on edge.    - Being easily fatigued.    - Difficulty concentrating or mind going blank.    - Irritability.    - Sleep disturbance (difficulty falling or staying asleep, or restless unsatisfying sleep).   E. The anxiety, worry, or physical symptoms cause clinically significant distress or impairment in social, occupational, or other important areas of functioning.   History of Hypomania, symptoms included:  A. A distinct period of abnormally and persistently elevated, expansive, or irritable mood and abnormally and persistently increased activity or energy lasting at least 4 consecutive days and presentmost of the day, nearly every day.  B. During the period of mood disturbance and increased energy and activity, three (or more) of the following symptoms have persisted (four if the mood is only irritable), represent a nonticeable change from usual behaivor, and have been present to a degree:   - inflated self-esteem or grandiosity    - decreased need for sleep (e.g., feels rested after only 3 hours of sleep)    - more talkative than usual or pressure to keep talking    - flight of ideas or subjective experience that thoughts are racing   - distractibility  D. The disturbance in mood and the change in functioning are " observable by others   - Depressed mood. Note: In children and adolescents, can be irritable mood.     - Diminished interest or pleasure in all, or almost all, activities.    - Increased sleep.    - Fatigue or loss of energy.    - Feelings of worthlessness or inappropriate and excessive guilt.    - Diminished ability to think or concentrate, or indecisiveness.    - Recurrent thoughts of death (not just fear of dying), recurrent suicidal ideation without a specific plan, or a suicide attempt or a specific plan for committing suicide.   B) The symptoms cause clinically significant distress or impairment in social, occupational, or other important areas of functioning    Functional Status:  Patient reports the following functional impairments: self-care and social interactions.     WHODAS:   WHODAS 2.0 Total Score 10/8/2021   Total Score 38   Total Score MyChart 38     Programmatic care:  Current LOCUS was assessed and patient needs the following level of care based on score 55+  .    Clinical Summary:  1. Reason for assessment: Referral from therapist  .  2. Psychosocial, Cultural and Contextual Factors: NONE  .  3. Principal DSM5 Diagnoses  (Sustained by DSM5 Criteria Listed Above):   296.89 Bipolar II Disorder Depressed.  4. Other Diagnoses that is relevant to services:   296.32 (F33.1) Major Depressive Disorder, Recurrent Episode, Moderate _  300.02 (F41.1) Generalized Anxiety Disorder.  5. Provisional Diagnosis:  None.  6. Prognosis: Expect Improvement.  7. Likely consequences of symptoms if not treated: If untreated, patient's mental health will likely deteriorate and may require a higher level of care.  8. Client strengths include:  has a previous history of therapy and support of family, friends and providers .     Recommendations:     1. Plan for Safety and Risk Management:   A safety and risk management plan has been developed including: Patient consented to co-developed safety plan.  Safety and risk  management plan was completed.  Patient agreed to use safety plan should any safety concerns arise.  A copy was given to the patient..          Report to child / adult protection services was NA.     2. Patient's identified none.     3. Initial Treatment will focus on:    Depressed Mood   Anxiety   Mood Instability        4. Resources/Service Plan:    services are not indicated.   Modifications to assist communication are not indicated.   Additional disability accommodations are not indicated.      5. Collaboration:   Collaboration / coordination of treatment will be initiated with the following  support professionals: none.      6.  Referrals:   The following referral(s) will be initiated: 55+ Senior Outpatient Program. Next Scheduled Appointment: 10/14/2021.     A Release of Information has been obtained for the following: none.    7. LEESA:    LEESA:  Discussed the general effects of drugs and alcohol on health and well-being. Provider gave patient printed information about the effects of chemical use on their health and well being.     8. Records:   These were reviewed at time of assessment.   Information in this assessment was obtained from the medical record and  provided by patient who is a good historian.    Patient will have open access to their mental health medical record.      Provider Name/ Credentials:  MOI Henderson, ORALIA    2021                                    LOCUS Worksheet     Name: Dash Arizmendi MRN: [id number]    : 1965      Gender:  male    PMI:     Provider Name: Mei   Provider NPI:  [id number]    Actual level of Care Provided:  Therapy     Service(s) receiving or referred to:  55 Plus    Reason for Variance:  For symptom management due to worsening mental health symptoms and passive suicidal ideation.        Rating completed by: MOI Henderson, ORALIA        I. Risk of Harm:   2      Low Risk of Harm    II. Functional Status:   2      Mild  "Impairment    III. Co-Morbidity:   2      Minor Co-Morbidity    IV - A. Recovery Environment - Level of Stress:   3      Moderately Stress Environment    IV - B. Recovery Environment - Level of Support:   3      Limited Support in Environment    V. Treatment and Recovery History:   3      Moderate to Equivocal Response to Treatment and Recovery Management    VI. Engagement and Recovery Project:   3      Limited Engagement and Recovery       18 Composite Score    Level of Care Recommendation:   17 to 19       High Intensity Community Based Services                  Outpatient Mental Health Services - Adult    MY COPING PLAN FOR SAFETY    PATIENT'S NAME: Dash Arizmendi  MRN:   [id number]    SAFETY PLAN:    Step 1: Warning signs / cues (Thoughts, images, mood, situation, behavior) that a crisis may be developing:      Thoughts: \"I don't matter\", \"People would be better off without me\" and \"I'm a burden\"    Images: obsessive thoughts of death or dying: \"I don't think i'm going to die, but I don't think that i'm going to live\"    Thinking Processes: ruminations (can't stop thinking about my problems), racing thoughts, intrusive thoughts (bothersome, unwanted thoughts that come out of nowhere) and highly critical and negative thoughts.    Mood: worsening depression, hopelessness and helplessness    Behaviors: isolating/withdrawing , not taking care of myself, not taking care of my responsibilities and not sleeping enough    Situations: changes in symptoms: \"I think i'm just in the horrible loop of my mental health\"       Step 2: Coping strategies - Things I can do to take my mind off of my problems without contacting another person (relaxation technique, physical activity):      Distress Tolerance Strategies:  Sleep and play with pet    Physical Activities: go for a walk    Focus on helpful thoughts:  \"I will get through this\"    Step 3: People and social settings that provide distraction:     Wife and Son   Talk "      Step 4: Remind myself of people and things that are important to me and worth living for:  Wife and Son     Step 5: When I am in crisis, I can ask these people to help me use my safety plan:     Saima Das (spouse) [phone]    Step 6: Making the environment safe:       be around others    Step 7: Professionals or agencies I can contact during a crisis:      Suicide Prevention Lifeline: 2-218-841-BFXK (0198)    Crisis Text Line Service (available 24 hours a day, 7 days a week): Text [place] to 011582    Call  **CRISIS (441646) from a cell phone to talk to a team of professionals who can help you.    Crisis Services By [facility]: Phone Number:   [place]     [phone]   [place]    [phone]   [place]    [phone]   [place]    [phone]   [place]    [phone]   [place] [phone]   [place]     [phone]       Call 911 or go to my nearest emergency department.     I helped develop this safety plan and agree to use it when needed.  I have been given a copy of this plan.        Today s date:  10/11/2021  Adapted from Safety Plan Template 2008 Delicia aPlma and Piter Kohli is reprinted with the express permission of the authors.  No portion of the Safety Plan Template may be reproduced without the express, written permission.  You can contact the authors at [email or web address] or [email or web address]

## 2021-10-11 NOTE — TELEPHONE ENCOUNTER
Writer placed a phone call this morning to check in patient for a virtual video appointment at 11am. Unable to get a hold of patient, writer left a voicemail with writer's call back number. If patient does not respond, writer will make a second call to patient.

## 2021-10-12 ASSESSMENT — PATIENT HEALTH QUESTIONNAIRE - PHQ9: SUM OF ALL RESPONSES TO PHQ QUESTIONS 1-9: 22

## 2021-10-12 ASSESSMENT — ANXIETY QUESTIONNAIRES: GAD7 TOTAL SCORE: 14

## 2021-10-13 ENCOUNTER — TELEPHONE (OUTPATIENT)
Dept: BEHAVIORAL HEALTH | Facility: CLINIC | Age: 56
End: 2021-10-13

## 2021-10-13 NOTE — TELEPHONE ENCOUNTER
Writer called patient to complete admission questions for 55+ ADT A2. Patient denies having other questions at this time.

## 2021-10-14 ENCOUNTER — HOSPITAL ENCOUNTER (OUTPATIENT)
Dept: BEHAVIORAL HEALTH | Facility: CLINIC | Age: 56
End: 2021-10-14
Attending: FAMILY MEDICINE
Payer: COMMERCIAL

## 2021-10-14 PROCEDURE — 90853 GROUP PSYCHOTHERAPY: CPT | Mod: GT,95 | Performed by: SOCIAL WORKER

## 2021-10-14 PROCEDURE — 90853 GROUP PSYCHOTHERAPY: CPT | Mod: GT,95 | Performed by: COUNSELOR

## 2021-10-14 PROCEDURE — G0177 OPPS/PHP; TRAIN & EDUC SERV: HCPCS | Mod: GT,95 | Performed by: SOCIAL WORKER

## 2021-10-14 NOTE — GROUP NOTE
Service Modality:  Video Visit     Telemedicine Visit: The patient's condition can be safely assessed and treated via synchronous audio and visual telemedicine encounter.       Reason for Telemedicine Visit: Services only offered telehealth and due to COVID-19.     Originating Site (Patient Location): Patient's home     Distant Site (Provider Location): Provider Remote Setting- Home Office     Consent:  The patient/guardian has verbally consented to: the potential risks and benefits of telemedicine (video visit) versus in person care; bill my insurance or make self-payment for services provided; and responsibility for payment of non-covered services.      Patient would like the video invitation sent by:  My Chart     Mode of Communication:  Video Conference via Medical Zoom     As the provider I attest to compliance with applicable laws and regulations related to telemedicine.    Process Group Note    PATIENT'S NAME: Dash Arizmendi  MRN:   [id number]  :   1965  ACCT. NUMBER: [id number]  DATE OF SERVICE: 10/14/21  START TIME:  9:00 AM  END TIME:  9:50 AM  FACILITATOR: Mariangel Patiño [facility]  TOPIC:  Process Group    Diagnoses:  296.89 Bipolar II Disorder Depressed.  96.32 (F33.1) Major Depressive Disorder, Recurrent Episode, Moderate _  300.02 (F41.1) Generalized Anxiety Disorder.      [facility] 55+ Program  TRACK: A2    NUMBER OF PARTICIPANTS: 7          Data:    Session content: At the start of this group, patients were invited to check in by identifying themselves, describing their current emotional status, and identifying issues to address in this group.   Area(s) of treatment focus addressed in this session included Symptom Management, Personal Safety, Community Resources/Discharge Planning, Abstinence/Relapse Prevention, Develop / Improve Independent Living Skills and Develop Socialization / Interpersonal Relationship Skills.    Aren began the 55+ IOP today. He was welcomed and  introductions were given. Aren was able to self disclose his mood symptoms leading to his admission. He did endorse anxious mood. He reports hx of SIB by cutting. Denies safety issues or urges for self harm today. He is aware of coping skills for emotion regulation. He has taken DBT. He is practicing patience. Denies concerns for his weekend.      Therapeutic Interventions/Treatment Strategies:  Psychotherapist offered support, feedback and validation and reinforced use of skills. Treatment modalities used include Cognitive Behavioral Therapy.    Assessment:    Patient response:   Patient responded to session by verbalizing understanding    Possible barriers to participation / learning include: and no barriers identified    Health Issues:   None reported       Substance Use Review:   Substance Use: No active concerns identified.    Mental Status/Behavioral Observations  Appearance:   Appropriate   Eye Contact:   Good   Psychomotor Behavior: Normal   Attitude:   Cooperative  Interested Pleasant  Orientation:   All  Speech   Rate / Production: Normal    Volume:  Normal   Mood:    Anxious   Affect:    Appropriate   Thought Content:   Clear and Safety denies any current safety concerns including suicidal ideation, self-harm, and homicidal ideation  Thought Form:  Coherent  Goal Directed  Logical     Insight:    Good     Plan:     Safety Plan: No current safety concerns identified.  Recommended that patient call 911 or go to the local ED should there be a change in any of these risk factors.     Barriers to treatment: None identified    Patient Contracts (see media tab):  None    Substance Use: Not addressed in session     Continue or Discharge: Patient will continue in 55+ Program (55+) as planned. Patient is likely to benefit from learning and using skills as they work toward the goals identified in their treatment plan. Aren will continue for mood stabilization and symptom management education for mental health  recovery.      Mariangel Patiño, [facility]  October 14, 2021

## 2021-10-14 NOTE — GROUP NOTE
Service Modality:  Video Visit     Telemedicine Visit: The patient's condition can be safely assessed and treated via synchronous audio and visual telemedicine encounter.       Reason for Telemedicine Visit: Services only offered telehealth and due to COVID-19.     Originating Site (Patient Location): Patient's home     Distant Site (Provider Location): Provider Remote Setting- Home Office     Consent:  The patient/guardian has verbally consented to: the potential risks and benefits of telemedicine (video visit) versus in person care; bill my insurance or make self-payment for services provided; and responsibility for payment of non-covered services.      Patient would like the video invitation sent by:  My Chart     Mode of Communication:  Video Conference via Medical Zoom     As the provider I attest to compliance with applicable laws and regulations related to telemedicine.    Psychotherapy Group Note    PATIENT'S NAME: Dash Arizmendi  MRN:   [id number]  :   1965  ACCT. NUMBER: [id number]  DATE OF SERVICE: 10/14/21  START TIME: 11:00 AM  END TIME: 11:50 AM  FACILITATOR: Mariangel Patiño LICSW  TOPIC:  EBP Group: Symptom Awareness  [facility] 55+ Program  TRACK: A2    NUMBER OF PARTICIPANTS: 7    Summary of Group / Topics Discussed:  Symptom Awareness: Mood Disorders: Patients received a general overview of mood disorders including  anxiety disorders and how it relates to their current symptoms. The purpose is to promote understanding of their diagnoses and how it impacts their functioning. Patients reviewed their current awareness of symptoms and diagnoses. Patients received information regarding diagnoses, etiology, cultural, and environmental factors as well as impact on functioning.     Patient Session Goals / Objectives:    Discussed patient individual symptoms and experiences    Reviewed diagnostic criteria and etiology of diagnoses     Discussed strategies to deal with the anxiety  cycle        Patient Participation / Response:  Fully participated with the group by sharing personal reflections / insights and openly received / provided feedback with other participants.    Demonstrated understanding of topics discussed through group discussion and participation and Verbalized understanding of how awareness can benefit mental health symptoms     Treatment Plan:  Patient has a current master individualized treatment plan.  See Epic treatment plan for more information.    Mariangel Patiño, LICSW

## 2021-10-15 ENCOUNTER — TELEPHONE (OUTPATIENT)
Dept: BEHAVIORAL HEALTH | Facility: CLINIC | Age: 56
End: 2021-10-15

## 2021-10-15 NOTE — GROUP NOTE
Psychotherapy Group Note    PATIENT'S NAME: Dash Arizmendi  MRN:   [id number]  :   1965  ACCT. NUMBER: [id number]  DATE OF SERVICE: 10/14/21  START TIME: 10:00 AM  END TIME: 10:50 AM  FACILITATOR: Car Bustos LMFT  TOPIC: MH EBP Group: Specialty Awareness  [facility] 55+ Program  TRACK: A2    NUMBER OF PARTICIPANTS: 7    Summary of Group / Topics Discussed:  Specialty Topics: Life Transitions: The topic of life transitions was presented in order to help patients to better understand the challenges presented by life transitions, and how to best navigate them. Exploring the phases of transition and how one works through them was discussed. Patients were provided with information regarding community resources.     Patient Session Goals / Objectives:    Discussed the timing and nature of major life transitions    Explored how life transitions may impact mental health and functioning    Discussed coping strategies to manage symptoms and help with transitioning    Discussed and planned a successful transition    Service Modality:  Video Visit     Telemedicine Visit: The patient's condition can be safely assessed and treated via synchronous audio and visual telemedicine encounter.      Reason for Telemedicine Visit: Services only offered telehealth and due to COVID-19.    Originating Site (Patient Location): Patient's home    Distant Site (Provider Location): Provider Remote Setting- Home Office    Consent:  The patient/guardian has verbally consented to: the potential risks and benefits of telemedicine (video visit) versus in person care; bill my insurance or make self-payment for services provided; and responsibility for payment of non-covered services.     Patient would like the video invitation sent by:  My Chart    Mode of Communication:  Video Conference via Medical Zoom    As the provider I attest to compliance with applicable laws and regulations related to telemedicine.      Patient  Participation / Response:  Minimally participated, only when prompted / asked.    Demonstrated understanding of topics discussed through group discussion and participation    Treatment Plan:  Patient has an initial individualized treatment plan that was created as part of their diagnostic assessment / admission process.  A master individualized treatment plan is in the process of being developed with the patient and multi-disciplinary care team.    Car Bustos LMFT

## 2021-10-15 NOTE — TELEPHONE ENCOUNTER
"RN Review of Medical History / Physical Health Screen  Outpatient Mental Health Programs - [facility] Mental Health Day Treatment    PATIENT'S NAME: Dash Arizmendi  MRN:   [id number]  :   1965  ACCT. NUMBER: [id number]  CURRENT AGE:  56 year old    DATE OF DIAGNOSTIC ASSESSMENT: 10/11/21  DATE OF ADMISSION: 10/14/21     Please see Diagnostic Assessment for additional Medical History.     General Health:   Have you had any exposure to any communicable disease in the past 2-3 weeks? no     Are you aware of safe sex practices? yes   Do you have a history of seizures?     If so, do you have a seizure plan? Known triggers?     Notify patient that we will call 911 (if virtual) or a code (if in-person), if we were to witness seizure during group. no    no  no    yes     Nutrition:    Are you on a special diet? If yes, please explain:  no   Do you have any concerns regarding your nutritional status? If yes, please explain:  no   Have you had any appetite changes in the last 3 months?  Yes, but has spoken w/ PCP, decrease     Have you had any weight loss or weight gain in the last 3 months?  Yes, how much? Lost 14 pounds; depression; on hypothyroid meds and Vit D     Do you have a history of an eating disorder? no   Do you have a history of being in an eating disorder program? no     Patient height and weight recorded by RN in epic flowsheet: no No; Unable to measure  Because of temporary in-person programmatic suspension due to COVID-19 pandemic, all pt weights and heights will be collected through patient self-report an recorded in physical health screening progress note upon admission to the program.                            Height/Weight Review:  Patient reported height:     5'7\"   Patient reports weight:  Date last checked:  214 pounds   Any referrals/needs identified?                BMI Review:  Was the patient informed of BMI? no      Findings See above         Fall Risk:   Have you had " any falls in the past 3 months? no     Do you currently use any assistive devices for mobility?   no      Additional Comments/Assessment: Pt denies seizures (current/historical), dizziness, mobility concerns. No fall risk assessed; No safety concerns r/t falls.  (Reports some med SE of dizziness; psych provider is aware)  Has IT    Per completion of the Medical History / Physical Health Screen, is there a recommendation to see / follow up with a primary care physician/clinic or dentist?    Yes, Recommendations:   medications      Luz Kenney RN  10/15/2021

## 2021-10-18 ENCOUNTER — HOSPITAL ENCOUNTER (OUTPATIENT)
Dept: BEHAVIORAL HEALTH | Facility: CLINIC | Age: 56
End: 2021-10-18
Attending: PSYCHIATRY & NEUROLOGY
Payer: COMMERCIAL

## 2021-10-18 PROCEDURE — 90853 GROUP PSYCHOTHERAPY: CPT | Mod: GT,95

## 2021-10-18 PROCEDURE — 90853 GROUP PSYCHOTHERAPY: CPT | Mod: GT,95 | Performed by: SOCIAL WORKER

## 2021-10-18 PROCEDURE — 99215 OFFICE O/P EST HI 40 MIN: CPT | Performed by: PSYCHIATRY & NEUROLOGY

## 2021-10-18 NOTE — GROUP NOTE
Service Modality:  Video Visit     Telemedicine Visit: The patient's condition can be safely assessed and treated via synchronous audio and visual telemedicine encounter.       Reason for Telemedicine Visit: Services only offered telehealth and due to COVID-19.     Originating Site (Patient Location): Patient's home     Distant Site (Provider Location): Provider Remote Setting- Home Office     Consent:  The patient/guardian has verbally consented to: the potential risks and benefits of telemedicine (video visit) versus in person care; bill my insurance or make self-payment for services provided; and responsibility for payment of non-covered services.      Patient would like the video invitation sent by:  My Chart     Mode of Communication:  Video Conference via Medical Zoom     As the provider I attest to compliance with applicable laws and regulations related to telemedicine.    Psychoeducation Group Note    PATIENT'S NAME: Dash Arizmendi  MRN:   [id number]  :   1965  ACCT. NUMBER: [id number]  DATE OF SERVICE: 10/18/21  START TIME: 11:00 AM  END TIME: 11:50 AM  FACILITATOR: Mariangel Patiño LICSW  TOPIC: MH EBP Group: Health Maintenance  [facility] 55+ Program  TRACK: A2    NUMBER OF PARTICIPANTS: 7    Summary of Group / Topics Discussed:  Health Maintenance: Goal Setting: Meaningful goals can bring a sense of direction and purpose in life.  They also highlight our most important values. Patients were assisted by instructor to identify short term goals to promote their mental health recovery and improve overall health and wellness. Patients were educated on SMART goal setting framework as a strategy to increase outcomes and promote success.    Patient Session Goals / Objectives:  ? Explained the key concepts of SMART goal setting framework  ? Identified three goals successfully using SMART goal setting framework  ? Reviewed concept of balance in wellness as it pertains to goal setting         Patient Participation / Response:  Fully participated with the group by sharing personal reflections / insights and openly received / provided feedback with other participants.    Demonstrated understanding of topics discussed through group discussion and participation and Verbalized understanding of health maintenance topic    Treatment Plan:  Patient has a current master individualized treatment plan.  See Epic treatment plan for more information.    Mariangel Patiño, LICSW

## 2021-10-18 NOTE — ADDENDUM NOTE
Encounter addended by: Mariangel Patiño, LICSW on: 10/18/2021 4:41 PM   Actions taken: Charge Capture section accepted

## 2021-10-18 NOTE — GROUP NOTE
"Service Modality:  Video Visit     Telemedicine Visit: The patient's condition can be safely assessed and treated via synchronous audio and visual telemedicine encounter.       Reason for Telemedicine Visit: Services only offered telehealth and due to COVID-19.     Originating Site (Patient Location): Patient's home     Distant Site (Provider Location): Provider Remote Setting- Home Office     Consent:  The patient/guardian has verbally consented to: the potential risks and benefits of telemedicine (video visit) versus in person care; bill my insurance or make self-payment for services provided; and responsibility for payment of non-covered services.      Patient would like the video invitation sent by:  My Chart     Mode of Communication:  Video Conference via Medical Zoom     As the provider I attest to compliance with applicable laws and regulations related to telemedicine.    Group Note    PATIENT'S NAME: Dash Arizmendi  MRN:   [id number]  :   1965  ACCT. NUMBER: [id number]  DATE OF SERVICE: 10/18/21  START TIME:  9:00 AM  END TIME:  9:50 AM  FACILITATOR: Mariangel Patiño [facility]  TOPIC: MH Process Group    Diagnoses:  296.89 Bipolar II Disorder Depressed.  296.32 (F33.1) Major Depressive Disorder, Recurrent Episode, Moderate _  300.02 (F41.1) Generalized Anxiety Disorder.      [facility] 55+ Program  TRACK: A2    NUMBER OF PARTICIPANTS: 7          Data:    Session content: At the start of this group, patients were invited to check in by identifying themselves, describing their current emotional status, and identifying issues to address in this group.   Area(s) of treatment focus addressed in this session included Symptom Management, Personal Safety, Community Resources/Discharge Planning, Abstinence/Relapse Prevention, Develop / Improve Independent Living Skills and Develop Socialization / Interpersonal Relationship Skills.    rAen reported his mood symptoms are \"status quo.' He endorsed " "restlessness. Denies S/I or safety issues. He processed his feelings with the  Bipolar Disorder dx and how he finds himself \"here again\". He is medication compliant. His outpatient psychiatrist is on leave till December 8 and will be consulting with Dr. Tabor for the first time for medication management.  He is dealing with the medication side effect of Tinnitus. He continues to use coping skills for symptom management.      Therapeutic Interventions/Treatment Strategies:  Psychotherapist offered support, feedback and validation and reinforced use of skills. Treatment modalities used include Cognitive Behavioral Therapy.    Assessment:    Patient response:   Patient responded to session by verbalizing understanding    Possible barriers to participation / learning include: and no barriers identified    Health Issues:   Yes: Tinnitus       Substance Use Review:   Substance Use: No active concerns identified.    Mental Status/Behavioral Observations  Appearance:   Appropriate   Eye Contact:   Good   Psychomotor Behavior: Normal   Attitude:   Cooperative  Interested Pleasant  Orientation:   All  Speech   Rate / Production: Normal    Volume:  Normal   Mood:    Bipolar symptoms are stabilizing. Feelings restlessness.  Affect:    Appropriate  Worrisome   Thought Content:   Clear and Safety denies any current safety concerns including suicidal ideation, self-harm, and homicidal ideation  Thought Form:  Coherent  Goal Directed  Logical     Insight:    Good     Plan:     Safety Plan: No current safety concerns identified.  Recommended that patient call 911 or go to the local ED should there be a change in any of these risk factors.     Barriers to treatment: None identified    Patient Contracts (see media tab):  None    Substance Use: Not addressed in session     Continue or Discharge: Patient will continue in 55+ Program (55+) as planned. Patient is likely to benefit from learning and using skills as they work toward the " goals identified in their treatment plan. Aren will continue for mood stabilization and symptom management education for mental health recovery.      Mariangel Patiño, [facility]  October 18, 2021

## 2021-10-18 NOTE — GROUP NOTE
Psychotherapy Group Note    PATIENT'S NAME: Dash Arizmendi  MRN:   [id number]  :   1965  ACCT. NUMBER: [id number]  DATE OF SERVICE: 10/18/21  START TIME: 10:00 AM  END TIME: 10:50 AM  FACILITATOR: Mary Anne Olson LICSW  TOPIC: MH EBP Group: Specialty Awareness  [facility] 55+ Program  TRACK: A2                              Service Modality:  Video Visit     Telemedicine Visit: The patient's condition can be safely assessed and treated via synchronous audio and visual telemedicine encounter.      Reason for Telemedicine Visit: Services only offered telehealth    Originating Site (Patient Location): Patient's home    Distant Site (Provider Location): [facility] MENTAL HEALTH & ADDICTION SERVICES    Consent:  The patient/guardian has verbally consented to: the potential risks and benefits of telemedicine (video visit) versus in person care; bill my insurance or make self-payment for services provided; and responsibility for payment of non-covered services.     Patient would like the video invitation sent by:  My Chart    Mode of Communication:  Video Conference via Medical Zoom    As the provider I attest to compliance with applicable laws and regulations related to telemedicine.         NUMBER OF PARTICIPANTS: 7    Summary of Group / Topics Discussed:  Specialty Topics: Life Transitions/Healthy Aging: The topic of life transitions was presented in order to help patients to better understand the challenges presented by life transitions, and how to best navigate them. Exploring the phases of transition and how one works through them was discussed. Patients were provided with information regarding community resources.     Patient Session Goals / Objectives:    Discussed the timing and nature of major life transitions    Explored how life transitions may impact mental health and functioning    Discussed coping strategies to manage symptoms and help with transitioning    Discussed the Blue  Zones        Patient Participation / Response:  Fully participated with the group by sharing personal reflections / insights and openly received / provided feedback with other participants.    Demonstrated understanding of topics discussed through group discussion and participation    Treatment Plan:  Patient has an initial individualized treatment plan that was created as part of their diagnostic assessment / admission process.  A master individualized treatment plan is in the process of being developed with the patient and multi-disciplinary care team.    MARCOS LorenzoSW

## 2021-10-18 NOTE — PROGRESS NOTES
"Patient seen for a psychiatric eval for starting 55+ Senior day treatment    HPI:  He has been \"struggling\" with feeling restless, \"antsy\", \"can't sit still\" since Vraylar was increased last week from 1.5mg daily to 3mg daily.    History: he has bipolar illness with one manic spell in  when he was not on a neuroleptic or mood leveling med, and the remainder of his symptoms have been depression.  Current symptoms are not sleeping well (onset and maintenance problems), not showering, not eating well. Irritable.  With shame, he will cut and have an euphoric response.    Current Outpatient Medications   Medication     cariprazine (VRAYLAR) 3 MG CAPS capsule     lamoTRIgine (LAMICTAL) 25 MG tablet     levomilnacipran (FETZIMA ER) 40 MG 24 hr capsule     lithium ER (LITHOBID) 300 MG CR tablet     QUEtiapine ER (SEROQUEL XR) 400 MG 24 hr tablet     RisperiDONE (RISPERDAL PO)     SERTRALINE HCL PO     Topiramate (TOPAMAX PO)     No current facility-administered medications for this encounter.   Last lithium level by his report = 0.8  NOTE, only current meds are Vraylar 3mg daily, Fetzima 40mg daily, and Lithium 300mg daily    Patient Active Problem List   Diagnosis     Bipolar 1 disorder, depressed, moderate (H)     Bipolar 2 disorder (H)     No results found for this or any previous visit (from the past 2016 hour(s)).     Past meds: Lamictal, Depakote, Topamax, Abilify, Seroquel, Risperdal, Zoloft, Prozac, Paxil, Effexor, Cymbalta, Wellbutrin, Remeron, Inderal    Alcohol use: none  Genesight testing: intermediate metabolizer at CY, normal MTHFR    Medical: Asthma, low D, hypothyroid    General appearance: good  Alert.   Affect: fair  Mood: fair    Speech:  normal.   Eye contact:  good.    Psychomotor behavior: restless, rocking  Gait: normal.    Abnormal movements: no TD  Delusions: none  Hallucinations:  none  Thoughts: logical  Associations: intact  Judgement: good  Insight: good  Cognitions: intact in " conversation  Memory:  intact in conversation  Orientation: normal    Not suicidal.    Imp:  Bipolar 1, most recent depressed, severe, not psychotic  2.  Akathisia    Plan: decrease Vraylar to 1.5mg  2.  Benadryl 25mg caps, take 2 at night and one every 4 hours for restlessness  3.  Add Cypomel 10mg to augment antidepressant  4.  Add naltrexone 50mg for cutting urges, 1/2 daily for one week anen one daily  5.  Senior day treatment program  6.  See in 2 weeks      Video-Visit Details    Type of service:  Video Visit    Video Start Time (time video started): 1400    Video End Time (time video stopped): 1440    Originating Location (pt. Location): Home    Distant Location (provider location): Provider remote location    Mode of Communication:  Video Conference via [facility]    Physician has received verbal consent for a Video Visit from the patient? Yes      Bobby Tabor MD

## 2021-10-20 ENCOUNTER — HOSPITAL ENCOUNTER (OUTPATIENT)
Dept: BEHAVIORAL HEALTH | Facility: CLINIC | Age: 56
End: 2021-10-20
Attending: PSYCHIATRY & NEUROLOGY
Payer: COMMERCIAL

## 2021-10-20 ENCOUNTER — TELEPHONE (OUTPATIENT)
Dept: BEHAVIORAL HEALTH | Facility: CLINIC | Age: 56
End: 2021-10-20

## 2021-10-20 PROCEDURE — 90853 GROUP PSYCHOTHERAPY: CPT | Mod: GT,95 | Performed by: SOCIAL WORKER

## 2021-10-20 PROCEDURE — 90853 GROUP PSYCHOTHERAPY: CPT | Mod: GT,95

## 2021-10-20 NOTE — GROUP NOTE
Service Modality:  Video Visit     Telemedicine Visit: The patient's condition can be safely assessed and treated via synchronous audio and visual telemedicine encounter.       Reason for Telemedicine Visit: Services only offered telehealth and due to COVID-19.     Originating Site (Patient Location): Patient's home     Distant Site (Provider Location): Provider Remote Setting- Home Office     Consent:  The patient/guardian has verbally consented to: the potential risks and benefits of telemedicine (video visit) versus in person care; bill my insurance or make self-payment for services provided; and responsibility for payment of non-covered services.      Patient would like the video invitation sent by:  My Chart     Mode of Communication:  Video Conference via Medical Zoom     As the provider I attest to compliance with applicable laws and regulations related to telemedicine.    Psychotherapy Group Note    PATIENT'S NAME: Dash Arizmendi  MRN:   [id number]  :   1965  ACCT. NUMBER: [id number]  DATE OF SERVICE: 10/20/21  START TIME: 10:00 AM  END TIME: 10:50 AM  FACILITATOR: Mariangel Patiño LICSW  TOPIC:  EBP Group: Symptom Awareness  [facility] 55+ Program  TRACK: A    NUMBER OF PARTICIPANTS: 6    Summary of Group / Topics Discussed:  Symptom Awareness: Mood Disorders: Patients received a general overview of mood disorders including depressive disorders, anxiety disorders, and bipolar disorders and how it relates to their current symptoms. The purpose is to promote understanding of their diagnoses and how it impacts their functioning. Patients reviewed their current awareness of symptoms and diagnoses. Patients received information regarding diagnoses, etiology, cultural, and environmental factors as well as impact on functioning.     Patient Session Goals / Objectives:    Discussed patient individual symptoms and experiences    Reviewed diagnostic criteria and etiology of diagnoses          Patient Participation / Response:  Fully participated with the group by sharing personal reflections / insights and openly received / provided feedback with other participants.    Demonstrated understanding of topics discussed through group discussion and participation and Verbalized understanding of how awareness can benefit mental health symptoms     Treatment Plan:  Patient has a current master individualized treatment plan.  See Epic treatment plan for more information.    Mariangel Patiño, LICSW

## 2021-10-20 NOTE — PROGRESS NOTES
Acknowledgement of Current Treatment Plan       I have reviewed my treatment plan with my therapist, MEGAN Wolfe LICSW.   I agree with the plan as it is written in the electronic health record. (A-2 Track)      Name:      Signature:  Dash Arizmendi         Unable to sign   Dr Bobby Tabor MD  Psychiatrist      Mary Anne Olson, [facility]  Psychotherapist        MEGAN Wolfe LICSW   Psychotherapist     MEGAN Wolfe [facility]

## 2021-10-20 NOTE — GROUP NOTE
Service Modality:  Video Visit     Telemedicine Visit: The patient's condition can be safely assessed and treated via synchronous audio and visual telemedicine encounter.       Reason for Telemedicine Visit: Services only offered telehealth and due to COVID-19.     Originating Site (Patient Location): Patient's home     Distant Site (Provider Location): Provider Remote Setting- Home Office     Consent:  The patient/guardian has verbally consented to: the potential risks and benefits of telemedicine (video visit) versus in person care; bill my insurance or make self-payment for services provided; and responsibility for payment of non-covered services.      Patient would like the video invitation sent by:  My Chart     Mode of Communication:  Video Conference via Medical Zoom     As the provider I attest to compliance with applicable laws and regulations related to telemedicine.    Process Group Note    PATIENT'S NAME: Dash Arizmendi  MRN:   [id number]  :   1965  ACCT. NUMBER: [id number]  DATE OF SERVICE: 10/20/21  START TIME:  9:00 AM  END TIME:  9:50 AM  FACILITATOR: Mariangel Patiño [facility]  TOPIC:  Process Group    Diagnoses:  296.89 Bipolar II Disorder Depressed.  296.32 (F33.1) Major Depressive Disorder, Recurrent Episode, Moderate _  300.02 (F41.1) Generalized Anxiety Disorder.      [facility] 55+ Program  TRACK: A2    NUMBER OF PARTICIPANTS: 5          Data:    Session content: At the start of this group, patients were invited to check in by identifying themselves, describing their current emotional status, and identifying issues to address in this group.   Area(s) of treatment focus addressed in this session included Symptom Management, Personal Safety, Community Resources/Discharge Planning, Abstinence/Relapse Prevention and Develop / Improve Independent Living Skills.    Aren reports depressed mood. Denies S/I or safety issues. He consulted with Dr. Tabor for medication management for the  first time. There were medication changes including adding Naltrexone. He processed the stigma concerns. He is being prescribed this medication to manage impulses for SIB by cutting. He is working with his therapist (who he saw yesterday) to be aware of the emotional triggers. Denies SIB concerns today. He is aware of coping skills. He has a 100# 10 month old puppy he is training that offers emotional support.       Therapeutic Interventions/Treatment Strategies:  Psychotherapist offered support, feedback and validation and reinforced use of skills. Treatment modalities used include Cognitive Behavioral Therapy.    Assessment:    Patient response:   Patient responded to session by listening, focusing on goals, being attentive and verbalizing understanding    Possible barriers to participation / learning include: and no barriers identified    Health Issues:   None reported       Substance Use Review:   Substance Use: No active concerns identified.    Mental Status/Behavioral Observations  Appearance:   Appropriate   Eye Contact:   Good   Psychomotor Behavior: Normal   Attitude:   Cooperative  Interested Pleasant  Orientation:   All  Speech   Rate / Production: Normal    Volume:  Normal   Mood:    Depressed   Affect:    Appropriate   Thought Content:   Clear and Safety denies any current safety concerns including suicidal ideation, self-harm, and homicidal ideation  Thought Form:  Coherent  Goal Directed  Logical     Insight:    Good     Plan:     Safety Plan: No current safety concerns identified.  Recommended that patient call 911 or go to the local ED should there be a change in any of these risk factors.     Barriers to treatment: None identified    Patient Contracts (see media tab):  None    Substance Use: Not addressed in session     Continue or Discharge: Patient will continue in 55+ Program (55+) as planned. Patient is likely to benefit from learning and using skills as they work toward the goals identified in  their treatment plan. Aren will continue for mood stabilization and symptom management education for mental health recovery.      Mariangel Patiño, [facility]  October 20, 2021

## 2021-10-20 NOTE — GROUP NOTE
Psychotherapy Group Note    PATIENT'S NAME: Dash Arizmendi  MRN:   [id number]  :   1965  ACCT. NUMBER: [id number]  DATE OF SERVICE: 10/20/21  START TIME: 11:00 AM  END TIME: 11:50 AM  FACILITATOR: Mary Anne Olson LICSW  TOPIC: MH EBP Group: Specialty Awareness  [facility] 55+ Program  TRACK: A2                              Service Modality:  Video Visit     Telemedicine Visit: The patient's condition can be safely assessed and treated via synchronous audio and visual telemedicine encounter.      Reason for Telemedicine Visit: Services only offered telehealth    Originating Site (Patient Location): Patient's home    Distant Site (Provider Location): [facility] MENTAL HEALTH & ADDICTION SERVICES    Consent:  The patient/guardian has verbally consented to: the potential risks and benefits of telemedicine (video visit) versus in person care; bill my insurance or make self-payment for services provided; and responsibility for payment of non-covered services.     Patient would like the video invitation sent by:  My Chart    Mode of Communication:  Video Conference via Medical Zoom    As the provider I attest to compliance with applicable laws and regulations related to telemedicine.         NUMBER OF PARTICIPANTS: 6    Summary of Group / Topics Discussed:  Specialty Topics: Life Transitions/Aging part 2: The topic of life transitions was presented in order to help patients to better understand the challenges presented by life transitions, and how to best navigate them. Exploring the phases of transition and how one works through them was discussed. Patients were provided with information regarding community resources.     Patient Session Goals / Objectives:    Discussed the timing and nature of major life transitions    Explored how life transitions may impact mental health and functioning    Discussed the Blue Zones research    Discussed coping strategies to manage symptoms and help with  transitioning    Discussed and planned a successful transition        Patient Participation / Response:  Fully participated with the group by sharing personal reflections / insights and openly received / provided feedback with other participants.    Demonstrated understanding of topics discussed through group discussion and participation and Identified / Expressed readiness to act on skill suggestions discussed in topic    Treatment Plan:  Patient has an initial individualized treatment plan that was created as part of their diagnostic assessment / admission process.  A master individualized treatment plan is in the process of being developed with the patient and multi-disciplinary care team.    MARCOS LorenzoSW

## 2021-10-21 ENCOUNTER — HOSPITAL ENCOUNTER (OUTPATIENT)
Dept: BEHAVIORAL HEALTH | Facility: CLINIC | Age: 56
End: 2021-10-21
Attending: PSYCHIATRY & NEUROLOGY
Payer: COMMERCIAL

## 2021-10-21 PROCEDURE — 90853 GROUP PSYCHOTHERAPY: CPT | Mod: GT,95 | Performed by: SOCIAL WORKER

## 2021-10-21 PROCEDURE — 90853 GROUP PSYCHOTHERAPY: CPT | Mod: GT,95

## 2021-10-21 NOTE — GROUP NOTE
Psychotherapy Group Note    PATIENT'S NAME: Dash Arizmendi  MRN:   [id number]  :   1965  ACCT. NUMBER: [id number]  DATE OF SERVICE: 10/21/21  START TIME: 10:30 AM  END TIME: 11:20 AM  FACILITATOR: Mary Anne Olson LICSW  TOPIC: MH EBP Group: Coping Skills  [facility] 55+ Program  TRACK: A2                              Service Modality:  Video Visit     Telemedicine Visit: The patient's condition can be safely assessed and treated via synchronous audio and visual telemedicine encounter.      Reason for Telemedicine Visit: Services only offered telehealth    Originating Site (Patient Location): Patient's home    Distant Site (Provider Location): [facility] MENTAL HEALTH & ADDICTION SERVICES    Consent:  The patient/guardian has verbally consented to: the potential risks and benefits of telemedicine (video visit) versus in person care; bill my insurance or make self-payment for services provided; and responsibility for payment of non-covered services.     Patient would like the video invitation sent by:  My Chart    Mode of Communication:  Video Conference via Medical Zoom    As the provider I attest to compliance with applicable laws and regulations related to telemedicine.         NUMBER OF PARTICIPANTS: 7    Summary of Group / Topics Discussed:  Coping Skills: Anxiety Reduction:  Patients learned coping skills to manage anxiety.  Reviewed the benefits of applying the aforementioned coping strategies.  Patients explored how these strategies might be applied to daily stressors or distressing situations.    Patient Session Goals / Objectives:    Understand the purpose and benefits of applying anxiety reduction techniques    [place] coping skills to use to reduce and manage anxiety    Address barriers to utilizing coping skills when in distress.        Patient Participation / Response:  Fully participated with the group by sharing personal reflections / insights and openly received / provided  feedback with other participants.    Demonstrated understanding of topics discussed through group discussion and participation and Expressed understanding of the relevance / importance of coping skills at distressing times in life    Treatment Plan:  Patient has a current master individualized treatment plan.  See Epic treatment plan for more information.    Mary Anne Kee, LICSW

## 2021-10-21 NOTE — GROUP NOTE
Service Modality:  Video Visit     Telemedicine Visit: The patient's condition can be safely assessed and treated via synchronous audio and visual telemedicine encounter.       Reason for Telemedicine Visit: Services only offered telehealth and due to COVID-19.     Originating Site (Patient Location): Patient's home     Distant Site (Provider Location): Provider Remote Setting- Home Office     Consent:  The patient/guardian has verbally consented to: the potential risks and benefits of telemedicine (video visit) versus in person care; bill my insurance or make self-payment for services provided; and responsibility for payment of non-covered services.      Patient would like the video invitation sent by:  My Chart     Mode of Communication:  Video Conference via Medical Zoom     As the provider I attest to compliance with applicable laws and regulations related to telemedicine.    Process Group Note    PATIENT'S NAME: Dash Arizmendi  MRN:   [id number]  :   1965  ACCT. NUMBER: [id number]  DATE OF SERVICE: 10/21/21  START TIME:  9:30 AM  END TIME: 10:25 AM  FACILITATOR: Mariangel Patiño [facility]  TOPIC:  Process Group    Diagnoses:  296.89 Bipolar II Disorder Depressed.  296.32 (F33.1) Major Depressive Disorder, Recurrent Episode, Moderate _  300.02 (F41.1) Generalized Anxiety Disorder.      [facility] 55+ Program  TRACK: A2    NUMBER OF PARTICIPANTS: 8          Data:    Session content: At the start of this group, patients were invited to check in by identifying themselves, describing their current emotional status, and identifying issues to address in this group.   Area(s) of treatment focus addressed in this session included Symptom Management, Personal Safety, Community Resources/Discharge Planning, Abstinence/Relapse Prevention and Develop Socialization / Interpersonal Relationship Skills.    Aren reports improved mood-less depressed. Denies lorrie. He slept better yesterday. He feels groggy this  morning. He started Naltrexone. Denies S/I or safety issues. He has plans to play board games with family including cribbage.      Therapeutic Interventions/Treatment Strategies:  Psychotherapist offered support, feedback and validation and reinforced use of skills. Treatment modalities used include Cognitive Behavioral Therapy.    Assessment:    Patient response:   Patient responded to session by verbalizing understanding    Possible barriers to participation / learning include: and no barriers identified    Health Issues:   None reported       Substance Use Review:   Substance Use: No active concerns identified.    Mental Status/Behavioral Observations  Appearance:   Appropriate   Eye Contact:   Good   Psychomotor Behavior: Normal   Attitude:   Cooperative  Interested Pleasant  Orientation:   All  Speech   Rate / Production: Normal    Volume:  Normal   Mood:    Mood continues to improve. Denies lorrie. Less depressed.  Affect:    Appropriate   Thought Content:   Clear and Safety denies any current safety concerns including suicidal ideation, self-harm, and homicidal ideation  Thought Form:  Coherent  Goal Directed  Logical     Insight:    Good     Plan:     Safety Plan: No current safety concerns identified.  Recommended that patient call 911 or go to the local ED should there be a change in any of these risk factors.     Barriers to treatment: None identified    Patient Contracts (see media tab):  None    Substance Use: Not addressed in session     Continue or Discharge: Patient will continue in 55+ Program (55+) as planned. Patient is likely to benefit from learning and using skills as they work toward the goals identified in their treatment plan. Aren will continue for mood stabilization and symptom management education for mental health recovery.       Mariangel Patiño, [facility]  October 21, 2021

## 2021-10-21 NOTE — GROUP NOTE
Service Modality:  Video Visit     Telemedicine Visit: The patient's condition can be safely assessed and treated via synchronous audio and visual telemedicine encounter.       Reason for Telemedicine Visit: Services only offered telehealth and due to COVID-19.     Originating Site (Patient Location): Patient's home     Distant Site (Provider Location): Provider Remote Setting- Home Office     Consent:  The patient/guardian has verbally consented to: the potential risks and benefits of telemedicine (video visit) versus in person care; bill my insurance or make self-payment for services provided; and responsibility for payment of non-covered services.      Patient would like the video invitation sent by:  My Chart     Mode of Communication:  Video Conference via Medical Zoom     As the provider I attest to compliance with applicable laws and regulations related to telemedicine.    Psychotherapy Group Note    PATIENT'S NAME: Dash Arizmendi  MRN:   [id number]  :   1965  ACCT. NUMBER: [id number]  DATE OF SERVICE: 10/21/21  START TIME: 11:30 AM  END TIME: 12:20 PM  FACILITATOR: Mariangel Patiño LICSW  TOPIC:  EBP Group: Self-Awareness  [facility] 55+ Program  TRACK: A2    NUMBER OF PARTICIPANTS: 7    Summary of Group / Topics Discussed:  Self-Awareness: Self-Compassion: Patients received overview of key concepts in developing self-compassion. Patients discussed mindfulness, self-kindness, and finding common humanity. Patients identified their current approach to problems in their lives and learned skills for increasing self-compassion. Patients identified ways they can put self-compassion skills into practice and problem solve barriers to application of skills.     Patient Session Goals / Objectives:    Strong components of self-compassion    Identify ways to practice self-compassion in daily life    Problem solve barriers to self-compassion practice      Patient Participation / Response:  Fully  participated with the group by sharing personal reflections / insights and openly received / provided feedback with other participants.    Demonstrated understanding of topics discussed through group discussion and participation and Practiced skills in session    Treatment Plan:  Patient has a current master individualized treatment plan.  See Epic treatment plan for more information.    Mariangel Patiño, [facility]

## 2021-10-22 NOTE — PROGRESS NOTES
Patient Active Problem List   Diagnosis     Bipolar 1 disorder, depressed, moderate (H)     Bipolar 2 disorder (H)       Current Outpatient Medications:      cariprazine (VRAYLAR) 3 MG CAPS capsule, Take by mouth daily, Disp: , Rfl:      lamoTRIgine (LAMICTAL) 25 MG tablet, Take 1 po daily x 1 week then increase every week by 25mg until reaching target dose of 200 mg/d, Disp: 240 tablet, Rfl: 0     levomilnacipran (FETZIMA ER) 40 MG 24 hr capsule, Take by mouth daily, Disp: , Rfl:      lithium ER (LITHOBID) 300 MG CR tablet, Take 900 mg by mouth 2 times daily, Disp: , Rfl:      QUEtiapine ER (SEROQUEL XR) 400 MG 24 hr tablet, Take 25 mg by mouth once as needed, Disp: , Rfl:      RisperiDONE (RISPERDAL PO), , Disp: , Rfl:      SERTRALINE HCL PO, Take 100 mg by mouth 2 times daily, Disp: , Rfl:      Topiramate (TOPAMAX PO), Take 100 mg by mouth, Disp: , Rfl:   Psychiatry staffing: case discussed  Diagnosis:  As above;  Plus JUAN PABLO.  Saw me this week, doing well in participating

## 2021-10-24 ENCOUNTER — HEALTH MAINTENANCE LETTER (OUTPATIENT)
Age: 56
End: 2021-10-24

## 2021-10-25 ENCOUNTER — HOSPITAL ENCOUNTER (OUTPATIENT)
Dept: BEHAVIORAL HEALTH | Facility: CLINIC | Age: 56
End: 2021-10-25
Attending: PSYCHIATRY & NEUROLOGY
Payer: COMMERCIAL

## 2021-10-25 PROCEDURE — 90853 GROUP PSYCHOTHERAPY: CPT | Mod: GT,95 | Performed by: SOCIAL WORKER

## 2021-10-25 PROCEDURE — 90853 GROUP PSYCHOTHERAPY: CPT | Mod: GT,95

## 2021-10-25 NOTE — GROUP NOTE
Psychotherapy Group Note    PATIENT'S NAME: Dash Arizmendi  MRN:   [id number]  :   1965  ACCT. NUMBER: [id number]  DATE OF SERVICE: 10/25/21  START TIME: 10:00 AM  END TIME: 10:50 AM  FACILITATOR: Mary Anne Olson LICSW  TOPIC: MH EBP Group: Coping Skills  [facility] 55+ Program  TRACK: A2                              Service Modality:  Video Visit     Telemedicine Visit: The patient's condition can be safely assessed and treated via synchronous audio and visual telemedicine encounter.      Reason for Telemedicine Visit: Services only offered telehealth    Originating Site (Patient Location): Patient's home    Distant Site (Provider Location): [facility] MENTAL HEALTH & ADDICTION SERVICES    Consent:  The patient/guardian has verbally consented to: the potential risks and benefits of telemedicine (video visit) versus in person care; bill my insurance or make self-payment for services provided; and responsibility for payment of non-covered services.     Patient would like the video invitation sent by:  My Chart    Mode of Communication:  Video Conference via Medical Zoom    As the provider I attest to compliance with applicable laws and regulations related to telemedicine.         NUMBER OF PARTICIPANTS: 8    Summary of Group / Topics Discussed:  Coping Skills: Distraction: Patients learned to mindfully use distraction as a way to decrease heightened stress in the moment.  Patients will identified situations that necessitate healthy distraction strategies.  They explored ways to manage physical symptoms of distress using distraction. The group began to distinguish when this can be useful in their lives or when other strategies would be more relevant or helpful.    Patient Session Goals / Objectives:    Understand the purpose and benefits of using healthy distraction to decrease distress.    Process what happens in the body when using distraction strategies.    Demonstrate understanding of when to  use distraction strategies.    Explore patient s current distraction activities, and how to take a more intentional approach to the use of distraction.    Identify and problem solve barriers to applying distraction strategies.    Choose 1-2 healthy distraction strategies to apply during times of distress.        Patient Participation / Response:  Fully participated with the group by sharing personal reflections / insights and openly received / provided feedback with other participants.    Demonstrated understanding of topics discussed through group discussion and participation and Expressed understanding of the relevance / importance of coping skills at distressing times in life    Treatment Plan:  Patient has a current master individualized treatment plan.  See Epic treatment plan for more information.    Mary Anne Kee, LICSW

## 2021-10-25 NOTE — GROUP NOTE
Service Modality:  Video Visit     Telemedicine Visit: The patient's condition can be safely assessed and treated via synchronous audio and visual telemedicine encounter.       Reason for Telemedicine Visit: Services only offered telehealth and due to COVID-19.     Originating Site (Patient Location): Patient's home     Distant Site (Provider Location): Provider Remote Setting- Home Office     Consent:  The patient/guardian has verbally consented to: the potential risks and benefits of telemedicine (video visit) versus in person care; bill my insurance or make self-payment for services provided; and responsibility for payment of non-covered services.      Patient would like the video invitation sent by:  My Chart     Mode of Communication:  Video Conference via Medical Zoom     As the provider I attest to compliance with applicable laws and regulations related to telemedicine.    Process Group Note    PATIENT'S NAME: Dash Arizmendi  MRN:   [id number]  :   1965  ACCT. NUMBER: [id number]  DATE OF SERVICE: 10/25/21  START TIME:  9:00 AM  END TIME:  9:50 AM  FACILITATOR: Mariangel Patiño [facility]  TOPIC:  Process Group    Diagnoses:  296.89 Bipolar II Disorder Depressed.  296.32 (F33.1) Major Depressive Disorder, Recurrent Episode, Moderate _  300.02 (F41.1) Generalized Anxiety Disorder.      [facility] 55+ Program  TRACK: A2    NUMBER OF PARTICIPANTS: 8          Data:    Session content: At the start of this group, patients were invited to check in by identifying themselves, describing their current emotional status, and identifying issues to address in this group.   Area(s) of treatment focus addressed in this session included Symptom Management, Personal Safety, Community Resources/Discharge Planning, Abstinence/Relapse Prevention, Develop / Improve Independent Living Skills, Develop Socialization / Interpersonal Relationship Skills and Physical Health .    Aren reports anxious mood related to  medication side effects. Denies S/I or safety issues. He reports on the pattern of mood symptoms which are improving overall. Denies lorrie. He did start his new medication which could be resulting in medication side effects. He has increase ringing in his ears. Aren is using coping skills and internal resources. His 100# dog is also like a support animal.      Therapeutic Interventions/Treatment Strategies:  Psychotherapist offered support, feedback and validation and reinforced use of skills. Treatment modalities used include Cognitive Behavioral Therapy.    Assessment:    Patient response:   Patient responded to session by verbalizing understanding    Possible barriers to participation / learning include: and no barriers identified    Health Issues:   Yes: Coping with Tinnitus.       Substance Use Review:   Substance Use: No active concerns identified.    Mental Status/Behavioral Observations  Appearance:   Appropriate   Eye Contact:   Good   Psychomotor Behavior: Normal   Attitude:   Cooperative  Interested  Orientation:   All  Speech   Rate / Production: Normal    Volume:  Normal   Mood:    Anxious   Affect:    Appropriate  Worrisome   Thought Content:   Clear and Safety denies any current safety concerns including suicidal ideation, self-harm, and homicidal ideation  Thought Form:  Coherent  Goal Directed  Logical     Insight:    Good     Plan:     Safety Plan: No current safety concerns identified.  Recommended that patient call 911 or go to the local ED should there be a change in any of these risk factors.     Barriers to treatment: None identified    Patient Contracts (see media tab):  None    Substance Use: Not addressed in session     Continue or Discharge: Patient will continue in 55+ Program (55+) as planned. Patient is likely to benefit from learning and using skills as they work toward the goals identified in their treatment plan. Aren will continue for mood stabilization and symptom management  education for mental health recovery.      Mariangel Patiño, [facility]  October 25, 2021

## 2021-10-25 NOTE — GROUP NOTE
Service Modality:  Video Visit     Telemedicine Visit: The patient's condition can be safely assessed and treated via synchronous audio and visual telemedicine encounter.       Reason for Telemedicine Visit: Services only offered telehealth and due to COVID-19.     Originating Site (Patient Location): Patient's home     Distant Site (Provider Location): Provider Remote Setting- Home Office     Consent:  The patient/guardian has verbally consented to: the potential risks and benefits of telemedicine (video visit) versus in person care; bill my insurance or make self-payment for services provided; and responsibility for payment of non-covered services.      Patient would like the video invitation sent by:  My Chart     Mode of Communication:  Video Conference via Medical Zoom     As the provider I attest to compliance with applicable laws and regulations related to telemedicine.    Psychoeducation Group Note    PATIENT'S NAME: Dash Arizmendi  MRN:   [id number]  :   1965  ACCT. NUMBER: [id number]  DATE OF SERVICE: 10/25/21  START TIME: 11:00 AM  END TIME: 11:50 AM  FACILITATOR: Mariangel Patiño LICSW  TOPIC: MH EBP Group: Health Maintenance  [facility] 55+ Program  TRACK: A2    NUMBER OF PARTICIPANTS: 8    Summary of Group / Topics Discussed:  Health Maintenance: Goal Setting: Meaningful goals can bring a sense of direction and purpose in life.  They also highlight our most important values. Patients were assisted by instructor to identify short term goals to promote their mental health recovery and improve overall health and wellness. Patients were educated on SMART goal setting framework as a strategy to increase outcomes and promote success.    Patient Session Goals / Objectives:  ? Explained the key concepts of SMART goal setting framework  ? Identified three goals successfully using SMART goal setting framework  ? Reviewed concept of balance in wellness as it pertains to goal setting         Patient Participation / Response:  Fully participated with the group by sharing personal reflections / insights and openly received / provided feedback with other participants.    Demonstrated understanding of topics discussed through group discussion and participation and Verbalized understanding of health maintenance topic    Treatment Plan:  Patient has a current master individualized treatment plan.  See Epic treatment plan for more information.    Mariangel Patiño, LICSW

## 2021-10-27 ENCOUNTER — HOSPITAL ENCOUNTER (OUTPATIENT)
Dept: BEHAVIORAL HEALTH | Facility: CLINIC | Age: 56
End: 2021-10-27
Attending: PSYCHIATRY & NEUROLOGY
Payer: COMMERCIAL

## 2021-10-27 PROCEDURE — 90853 GROUP PSYCHOTHERAPY: CPT | Mod: GT,95

## 2021-10-27 NOTE — GROUP NOTE
Psychotherapy Group Note    PATIENT'S NAME: Dash Arizmendi  MRN:   [id number]  :   1965  ACCT. NUMBER: [id number]  DATE OF SERVICE: 10/27/21  START TIME:  9:00 AM  END TIME:  9:50 AM  FACILITATOR: Mary Anne Olson LICSW  TOPIC: MH EBP Group: Coping Skills  [facility] 55+ Program  TRACK: A2                              Service Modality:  Video Visit     Telemedicine Visit: The patient's condition can be safely assessed and treated via synchronous audio and visual telemedicine encounter.      Reason for Telemedicine Visit: Services only offered telehealth    Originating Site (Patient Location): Patient's home    Distant Site (Provider Location): [facility] MENTAL HEALTH & ADDICTION SERVICES    Consent:  The patient/guardian has verbally consented to: the potential risks and benefits of telemedicine (video visit) versus in person care; bill my insurance or make self-payment for services provided; and responsibility for payment of non-covered services.     Patient would like the video invitation sent by:  My Chart    Mode of Communication:  Video Conference via Medical Zoom    As the provider I attest to compliance with applicable laws and regulations related to telemedicine.         NUMBER OF PARTICIPANTS: 8    Summary of Group / Topics Discussed:  Coping Skills:Worry Management:  Patients learned about worry management techniques.  Reviewed the benefits of applying the aforementioned coping strategies.  Patients explored how these strategies might be applied to daily stressors or distressing situations.    Patient Session Goals / Objectives:    Understand the purpose and benefits of applying  coping strategies    No Name worry management skills    Address barriers to utilizing coping skills when in distress.        Patient Participation / Response:  Fully participated with the group by sharing personal reflections / insights and openly received / provided feedback with other  participants.    Demonstrated understanding of topics discussed through group discussion and participation and Expressed understanding of the relevance / importance of coping skills at distressing times in life    Treatment Plan:  Patient has a current master individualized treatment plan.  See Epic treatment plan for more information.    Mary Anne Kee, LICSW

## 2021-10-28 ENCOUNTER — HOSPITAL ENCOUNTER (OUTPATIENT)
Dept: BEHAVIORAL HEALTH | Facility: CLINIC | Age: 56
End: 2021-10-28
Attending: PSYCHIATRY & NEUROLOGY
Payer: COMMERCIAL

## 2021-10-28 PROCEDURE — 90853 GROUP PSYCHOTHERAPY: CPT | Mod: GT,95

## 2021-10-28 PROCEDURE — 90853 GROUP PSYCHOTHERAPY: CPT | Mod: GT,95 | Performed by: SOCIAL WORKER

## 2021-10-28 NOTE — GROUP NOTE
Psychotherapy Group Note    PATIENT'S NAME: Dash Arizmendi  MRN:   [id number]  :   1965  ACCT. NUMBER: [id number]  DATE OF SERVICE: 10/28/21  START TIME: 10:00 AM  END TIME: 10:50 AM  FACILITATOR: Mary Anne Olson LICSW  TOPIC: MH EBP Group: Coping Skills  [facility] 55+ Program  TRACK: A2                              Service Modality:  Video Visit     Telemedicine Visit: The patient's condition can be safely assessed and treated via synchronous audio and visual telemedicine encounter.      Reason for Telemedicine Visit: Services only offered telehealth    Originating Site (Patient Location): Patient's home    Distant Site (Provider Location): [facility] MENTAL HEALTH & ADDICTION SERVICES    Consent:  The patient/guardian has verbally consented to: the potential risks and benefits of telemedicine (video visit) versus in person care; bill my insurance or make self-payment for services provided; and responsibility for payment of non-covered services.     Patient would like the video invitation sent by:  My Chart    Mode of Communication:  Video Conference via Medical Zoom    As the provider I attest to compliance with applicable laws and regulations related to telemedicine.         NUMBER OF PARTICIPANTS: 8  Summary of Group / Topics Discussed:  Coping Skills:Worry Management Part 3:  Patients learned about worry management techniques.  Reviewed the benefits of applying the aforementioned coping strategies.  Patients explored how these strategies might be applied to daily stressors or distressing situations.     Patient Session Goals / Objectives:    Understand the purpose and benefits of applying  coping strategies    [place] worry management skills    Address barriers to utilizing coping skills when in distress.      Patient Participation / Response:  Fully participated with the group by sharing personal reflections / insights and openly received / provided feedback with other  participants.    Demonstrated understanding of topics discussed through group discussion and participation, Expressed understanding of the relevance / importance of coping skills at distressing times in life and Identified / Expressed personal readiness to practice new coping skills    Treatment Plan:  Patient has a current master individualized treatment plan.  See Epic treatment plan for more information.    MARCOS LorenzoSW

## 2021-10-28 NOTE — GROUP NOTE
Service Modality:  Video Visit     Telemedicine Visit: The patient's condition can be safely assessed and treated via synchronous audio and visual telemedicine encounter.       Reason for Telemedicine Visit: Services only offered telehealth and due to COVID-19.     Originating Site (Patient Location): Patient's home     Distant Site (Provider Location): Provider Remote Setting- Home Office     Consent:  The patient/guardian has verbally consented to: the potential risks and benefits of telemedicine (video visit) versus in person care; bill my insurance or make self-payment for services provided; and responsibility for payment of non-covered services.      Patient would like the video invitation sent by:  My Chart     Mode of Communication:  Video Conference via Medical Zoom     As the provider I attest to compliance with applicable laws and regulations related to telemedicine.    Psychotherapy Group Note    PATIENT'S NAME: Dash Arizmendi  MRN:   [id number]  :   1965  ACCT. NUMBER: [id number]  DATE OF SERVICE: 10/28/21  START TIME: 11:00 AM  END TIME: 11:50 AM  FACILITATOR: Mariangel Patiño LICSW  TOPIC:  EBP Group: Relationship Skills  [facility] 55+ Program  TRACK: A2    NUMBER OF PARTICIPANTS: 8    Summary of Group / Topics Discussed:  Relationship Skills: Relationship Mapping: Patients identified different types of relationships they have in their life and examined if there is conflict or closeness, the degree of conflict or closeness, and the reason for conflict. The goal of this topic is to help patients gain awareness of the relationships they have with others, identify types of conflict in patients  lives and how they impact symptoms/functioning, and identify how they can improve relationships with relationship and interpersonal skills they have learned.     Patient Session Goals / Objectives:    Familiarized patients with awareness to the different types of relationships they may have  with different people in their lives    Discussed and practiced strategies to promote healthier understanding of interpersonal relationships with a focus on awareness of conflict, the causes of conflict, and the ways to transform conflict    Discussed the roles of support in mental health recovery.      Patient Participation / Response:  Fully participated with the group by sharing personal reflections / insights and openly received / provided feedback with other participants.    Demonstrated understanding of topics discussed through group discussion and participation, Demonstrated understanding of relationship skills and communication skills and Practiced skills in session    Treatment Plan:  Patient has a current master individualized treatment plan.  See Epic treatment plan for more information.    Mariangel Patiño, [facility]

## 2021-10-28 NOTE — GROUP NOTE
Service Modality:  Video Visit     Telemedicine Visit: The patient's condition can be safely assessed and treated via synchronous audio and visual telemedicine encounter.       Reason for Telemedicine Visit: Services only offered telehealth and due to COVID-19.     Originating Site (Patient Location): Patient's home     Distant Site (Provider Location): Provider Remote Setting- Home Office     Consent:  The patient/guardian has verbally consented to: the potential risks and benefits of telemedicine (video visit) versus in person care; bill my insurance or make self-payment for services provided; and responsibility for payment of non-covered services.      Patient would like the video invitation sent by:  My Chart     Mode of Communication:  Video Conference via Medical Zoom     As the provider I attest to compliance with applicable laws and regulations related to telemedicine.    Process Group Note    PATIENT'S NAME: Dash Arizmendi  MRN:   [id number]  :   1965  ACCT. NUMBER: [id number]  DATE OF SERVICE: 10/28/21  START TIME:  9:00 AM  END TIME:  9:50 AM  FACILITATOR: Mariangel Patiño [facility]  TOPIC:  Process Group    Diagnoses:  296.89 Bipolar II Disorder Depressed.  296.32 (F33.1) Major Depressive Disorder, Recurrent Episode, Moderate _  300.02 (F41.1) Generalized Anxiety Disorder.      [facility] 55+ Program  TRACK: A2    NUMBER OF PARTICIPANTS: 8          Data:    Session content: At the start of this group, patients were invited to check in by identifying themselves, describing their current emotional status, and identifying issues to address in this group.   Area(s) of treatment focus addressed in this session included Symptom Management, Personal Safety, Community Resources/Discharge Planning, Abstinence/Relapse Prevention, Develop / Improve Independent Living Skills, Develop Socialization / Interpersonal Relationship Skills and Physical Health .    Aren reports mood is flat. Denies lorrie.  He has worry related to awaiting a prescription refill from his provider's office for anti-depressant medication. It has been four days. He thinks he will get the refill today. He reports sleep continues to be interrupted. He is using coping skills which includes getting outside. He has a contractor coming on Monday to remove a 20 year old tree from his yard. Denies S/I or safety issues. He attended with his Orthodontist and will have his braces on longer. He has no concerns for his weekend.     Therapeutic Interventions/Treatment Strategies:  Psychotherapist offered support, feedback and validation and reinforced use of skills. Treatment modalities used include Cognitive Behavioral Therapy.    Assessment:    Patient response:   Patient responded to session by verbalizing understanding    Possible barriers to participation / learning include: and no barriers identified    Health Issues:   Yes: Sleep disturbance, Associated Psychological Distress  Has to have orthodonic braces on longer than expected.       Substance Use Review:   Substance Use: No active concerns identified.    Mental Status/Behavioral Observations  Appearance:   Appropriate   Eye Contact:   Good   Psychomotor Behavior: Normal   Attitude:   Cooperative  Interested Pleasant  Orientation:   All  Speech   Rate / Production: Normal    Volume:  Normal   Mood:    Mood is flat. Denies lorrie.  Affect:    Appropriate  Flat  Worrisome   Thought Content:   Clear and Safety denies any current safety concerns including suicidal ideation, self-harm, and homicidal ideation  Thought Form:  Coherent  Goal Directed  Logical     Insight:    Good     Plan:     Safety Plan: No current safety concerns identified.  Recommended that patient call 911 or go to the local ED should there be a change in any of these risk factors.     Barriers to treatment: None identified    Patient Contracts (see media tab):  None    Substance Use: Not addressed in session     Continue or  Discharge: Patient will continue in 55+ Program (55+) as planned. Patient is likely to benefit from learning and using skills as they work toward the goals identified in their treatment plan. Aren will have Monday off due to his power being turned off when a tree will be removed. He plans on returning on Wednesday, November 3.      Mariangel Patiño, [facility]  October 28, 2021

## 2021-11-03 ENCOUNTER — HOSPITAL ENCOUNTER (OUTPATIENT)
Dept: BEHAVIORAL HEALTH | Facility: CLINIC | Age: 56
End: 2021-11-03
Attending: PSYCHIATRY & NEUROLOGY
Payer: COMMERCIAL

## 2021-11-03 PROCEDURE — 90853 GROUP PSYCHOTHERAPY: CPT | Mod: GT,95 | Performed by: SOCIAL WORKER

## 2021-11-03 PROCEDURE — 90853 GROUP PSYCHOTHERAPY: CPT | Mod: GT,95

## 2021-11-03 NOTE — GROUP NOTE
Psychotherapy Group Note    PATIENT'S NAME: Dash Arizmendi  MRN:   [id number]  :   1965  ACCT. NUMBER: [id number]  DATE OF SERVICE: 21  START TIME:  9:00 AM  END TIME:  9:50 AM  FACILITATOR: Mary Anne Olson LICSW  TOPIC: MH EBP Group: Coping Skills  [facility] 55+ Program  TRACK: A2                              Service Modality:  Video Visit     Telemedicine Visit: The patient's condition can be safely assessed and treated via synchronous audio and visual telemedicine encounter.      Reason for Telemedicine Visit: Services only offered telehealth    Originating Site (Patient Location): Patient's home    Distant Site (Provider Location): [facility] MENTAL HEALTH & ADDICTION SERVICES    Consent:  The patient/guardian has verbally consented to: the potential risks and benefits of telemedicine (video visit) versus in person care; bill my insurance or make self-payment for services provided; and responsibility for payment of non-covered services.     Patient would like the video invitation sent by:  My Chart    Mode of Communication:  Video Conference via Medical Zoom    As the provider I attest to compliance with applicable laws and regulations related to telemedicine.         NUMBER OF PARTICIPANTS: 7    Summary of Group / Topics Discussed:  Coping Skills: Additional Coping Skills:  Patients learned about stress management skills.  Reviewed the benefits of applying the aforementioned coping strategies.  Patients explored how these strategies might be applied to daily stressors or distressing situations.    Patient Session Goals / Objectives:    Understand the purpose and benefits of applying stress management coping strategies    Identified those skills that may be helpful    Address barriers to utilizing coping skills when in distress.        Patient Participation / Response:  Fully participated with the group by sharing personal reflections / insights and openly received / provided  feedback with other participants.    Demonstrated understanding of topics discussed through group discussion and participation, Expressed understanding of the relevance / importance of coping skills at distressing times in life and Identified / Expressed personal readiness to practice new coping skills    Treatment Plan:  Patient has a current master individualized treatment plan.  See Epic treatment plan for more information.    Mary Anne Kee, LICSW

## 2021-11-03 NOTE — GROUP NOTE
Psychotherapy Group Note    PATIENT'S NAME: Dash Arizmendi  MRN:   [id number]  :   1965  ACCT. NUMBER: [id number]  DATE OF SERVICE: 21  START TIME: 11:00 AM  END TIME: 11:50 AM  FACILITATOR: Mary Anne Olson LICSW  TOPIC: MH EBP Group: Coping Skills  [facility] 55+ Program  TRACK: A2                              Service Modality:  Video Visit     Telemedicine Visit: The patient's condition can be safely assessed and treated via synchronous audio and visual telemedicine encounter.      Reason for Telemedicine Visit: Services only offered telehealth    Originating Site (Patient Location): Patient's home    Distant Site (Provider Location): [facility] MENTAL HEALTH & ADDICTION SERVICES    Consent:  The patient/guardian has verbally consented to: the potential risks and benefits of telemedicine (video visit) versus in person care; bill my insurance or make self-payment for services provided; and responsibility for payment of non-covered services.     Patient would like the video invitation sent by:  My Chart    Mode of Communication:  Video Conference via Medical Zoom    As the provider I attest to compliance with applicable laws and regulations related to telemedicine.         NUMBER OF PARTICIPANTS: 7  Summary of Group / Topics Discussed:  Foundations of Health: Sleep part 2: Discussed sleep issues and connection with mental illness. Discussed sleep hygiene techniques and ways to improve sleep.     Patient Session Goals / Objectives:  1. Described the effect and purpose of sleep on the brain and identify the amount of sleep needed  2. Identify sleep hygiene techniques  3. Described the connection between sleep disturbances and mental illness  ? Increased knowledge about treatments for sleep disorders         Patient Participation / Response:  Fully participated with the group by sharing personal reflections / insights and openly received / provided feedback with other  participants.    Demonstrated understanding of topics discussed through group discussion and participation, Expressed understanding of the relevance / importance of coping skills at distressing times in life and Identified / Expressed personal readiness to practice new coping skills    Treatment Plan:  Patient has a current master individualized treatment plan.  See Epic treatment plan for more information.    MARCOS LorenzoSW

## 2021-11-03 NOTE — GROUP NOTE
Service Modality:  Video Visit     Telemedicine Visit: The patient's condition can be safely assessed and treated via synchronous audio and visual telemedicine encounter.       Reason for Telemedicine Visit: Services only offered telehealth and due to COVID-19.     Originating Site (Patient Location): Patient's home     Distant Site (Provider Location): Provider Remote Setting- Home Office     Consent:  The patient/guardian has verbally consented to: the potential risks and benefits of telemedicine (video visit) versus in person care; bill my insurance or make self-payment for services provided; and responsibility for payment of non-covered services.      Patient would like the video invitation sent by:  My Chart     Mode of Communication:  Video Conference via Medical Zoom     As the provider I attest to compliance with applicable laws and regulations related to telemedicine.    Process Group Note    PATIENT'S NAME: Dash Arizmendi  MRN:   [id number]  :   1965  ACCT. NUMBER: [id number]  DATE OF SERVICE: 21  START TIME: 10:00 AM  END TIME: 10:50 AM  FACILITATOR: Mariangel Patiño [facility]  TOPIC:  Process Group    Diagnoses:  296.89 Bipolar II Disorder Depressed.  296.32 (F33.1) Major Depressive Disorder, Recurrent Episode, Moderate _  300.02 (F41.1) Generalized Anxiety Disorder.      [facility] 55+ Program  TRACK: A2    NUMBER OF PARTICIPANTS: 7          Data:    Session content: At the start of this group, patients were invited to check in by identifying themselves, describing their current emotional status, and identifying issues to address in this group.   Area(s) of treatment focus addressed in this session included Symptom Management, Personal Safety, Community Resources/Discharge Planning, Abstinence/Relapse Prevention, Develop / Improve Independent Living Skills, Develop Socialization / Interpersonal Relationship Skills and Physical Health .    Aren reports depressed mood.  Denies S/I  or safety issues. He processed how he coped with not having his antidepressant medication for four days. He was able to get his prescription refilled and is now taking his medication. He reports working with his MD. He has been trying to sleep more due to physical discomfort/withdrawl. He is aware of coping skills for symptom management. He continues to consult with his outpatient therapist. He is interested in a county .      Therapeutic Interventions/Treatment Strategies:  Psychotherapist offered support, feedback and validation and reinforced use of skills. Treatment modalities used include Cognitive Behavioral Therapy.    Assessment:    Patient response:   Patient responded to session by verbalizing understanding    Possible barriers to participation / learning include: and no barriers identified    Health Issues:   Yes: Feeling uncomfortable physicaly due to restarting to take antidepressant medication after being off for four days. His provider was not available for refill.        Substance Use Review:   Substance Use: No active concerns identified.    Mental Status/Behavioral Observations  Appearance:   Appropriate   Eye Contact:   Good   Psychomotor Behavior: Normal   Attitude:   Cooperative  Interested  Orientation:   All  Speech   Rate / Production: Normal    Volume:  Normal   Mood:    Depressed   Affect:    Appropriate   Thought Content:   Clear and Safety denies any current safety concerns including suicidal ideation, self-harm, and homicidal ideation  Thought Form:  Coherent  Goal Directed  Logical     Insight:    Good     Plan:     Safety Plan: No current safety concerns identified.  Recommended that patient call 911 or go to the local ED should there be a change in any of these risk factors.     Barriers to treatment: None    Patient Contracts (see media tab):  None    Substance Use: Not addressed in session     Continue or Discharge: Patient will continue in 55+ Program (55+) as planned.  Patient is likely to benefit from learning and using skills as they work toward the goals identified in their treatment plan. Aren will continue for mood stabilization and symptom management education for mental health management.      Mariangel Patiño, [facility]  November 3, 2021

## 2021-11-04 ENCOUNTER — HOSPITAL ENCOUNTER (OUTPATIENT)
Dept: BEHAVIORAL HEALTH | Facility: CLINIC | Age: 56
End: 2021-11-04
Attending: PSYCHIATRY & NEUROLOGY
Payer: COMMERCIAL

## 2021-11-04 PROCEDURE — 90853 GROUP PSYCHOTHERAPY: CPT | Mod: GT,95

## 2021-11-04 PROCEDURE — 90853 GROUP PSYCHOTHERAPY: CPT | Mod: GT,95 | Performed by: SOCIAL WORKER

## 2021-11-04 NOTE — GROUP NOTE
Service Modality:  Video Visit     Telemedicine Visit: The patient's condition can be safely assessed and treated via synchronous audio and visual telemedicine encounter.      Reason for Telemedicine Visit: Services only offered telehealth    Originating Site (Patient Location): Patient's home    Distant Site (Provider Location): Provider Remote Setting- Home Office    Consent:  The patient/guardian has verbally consented to: the potential risks and benefits of telemedicine (video visit) versus in person care; bill my insurance or make self-payment for services provided; and responsibility for payment of non-covered services.     Patient would like the video invitation sent by:  My Chart    Mode of Communication:  Video Conference via Medical Zoom    As the provider I attest to compliance with applicable laws and regulations related to telemedicine.      Psychotherapy Group Note    PATIENT'S NAME: Dash Arizmendi  MRN:   [id number]  :   1965  ACCT. NUMBER: [id number]  DATE OF SERVICE: 21  START TIME: 10:00 AM  END TIME: 10:50 AM  FACILITATOR: Concepcion Coto LMFT  TOPIC:  EBP Group: Coping Skills  [facility] 55+ Program  TRACK: A2    NUMBER OF PARTICIPANTS: 7    Summary of Group / Topics Discussed:  Coping Skills: Relapse Planning: Patients discussed and increased understanding of how anticipating and planning for possible increased symptoms is a proactive way to reduce the likelihood of relapse.  Patients shared individualized factors that may lead to increased symptoms and decompensation in functioning.  Each patient developed a relapse prevention plan designed to help them recognize when they may have increasing symptoms requiring them to take action and notify their key supports and care team.      Patient Session Goals / Objectives:    Identify and understand what factors may contribute to symptom relapse.    Identify actions that may be taken to manage  increased symptoms/stressors.    Create an individualized written relapse plan    Problem solve barriers to plan creation and implementation    Share relapse plan with key support people        Patient Participation / Response:  Moderately participated, sharing some personal reflections / insights and adequately adequately received / provided feedback with other participants.    Demonstrated understanding of topics discussed through group discussion and participation and Demonstrated knowledge of when to consider using a variety of coping skills in daily life    Treatment Plan:  Patient has a current master individualized treatment plan.  See Epic treatment plan for more information.    NICOLETTE Saenz

## 2021-11-04 NOTE — GROUP NOTE
Service Modality:  Video Visit     Telemedicine Visit: The patient's condition can be safely assessed and treated via synchronous audio and visual telemedicine encounter.       Reason for Telemedicine Visit: Services only offered telehealth and due to COVID-19.     Originating Site (Patient Location): Patient's home     Distant Site (Provider Location): Provider Remote Setting- Home Office     Consent:  The patient/guardian has verbally consented to: the potential risks and benefits of telemedicine (video visit) versus in person care; bill my insurance or make self-payment for services provided; and responsibility for payment of non-covered services.      Patient would like the video invitation sent by:  My Chart     Mode of Communication:  Video Conference via Medical Zoom     As the provider I attest to compliance with applicable laws and regulations related to telemedicine.    Psychotherapy Group Note    PATIENT'S NAME: Dash Arizmendi  MRN:   [id number]  :   1965  ACCT. NUMBER: [id number]  DATE OF SERVICE: 21  START TIME: 11:00 AM  END TIME: 11:50 AM  FACILITATOR: Mariangel Patiño LICSW  TOPIC:  EBP Group: Specialty Awareness  [facility] 55+ Program  TRACK: A2    NUMBER OF PARTICIPANTS: 8    Summary of Group / Topics Discussed:  Specialty Topics: Hope: The topic of hope was presented in order to help patients better understand the symptoms of hopelessness and how to become more hopeful. Patients discussed their current awareness of the topic and relevance to their functioning. Individual experiences with symptoms and treatment options were also discussed. Patients explored options for ongoing/future treatment and symptom management.      Patient Session Goals / Objectives:    Discussed definition of hopelessness    Discussed how hopelessness impacts functioning    Set a plan to utilize skills to reduce hopelessness        Patient Participation / Response:  Fully participated with the  group by sharing personal reflections / insights and openly received / provided feedback with other participants.    Demonstrated understanding of topics discussed through group discussion and participation, Verbalized understanding of ways to proactively manage illness and Practiced skills in session    Treatment Plan:  Patient has a current master individualized treatment plan.  See Epic treatment plan for more information.    Mariangel Patiño, LICSW

## 2021-11-04 NOTE — GROUP NOTE
Service Modality:  Video Visit     Telemedicine Visit: The patient's condition can be safely assessed and treated via synchronous audio and visual telemedicine encounter.       Reason for Telemedicine Visit: Services only offered telehealth and due to COVID-19.     Originating Site (Patient Location): Patient's home     Distant Site (Provider Location): Provider Remote Setting- Home Office     Consent:  The patient/guardian has verbally consented to: the potential risks and benefits of telemedicine (video visit) versus in person care; bill my insurance or make self-payment for services provided; and responsibility for payment of non-covered services.      Patient would like the video invitation sent by:  My Chart     Mode of Communication:  Video Conference via Medical Zoom     As the provider I attest to compliance with applicable laws and regulations related to telemedicine.    Process Group Note    PATIENT'S NAME: Dash Arizmendi  MRN:   [id number]  :   1965  ACCT. NUMBER: [id number]  DATE OF SERVICE: 21  START TIME:  9:00 AM  END TIME:  9:50 AM  FACILITATOR: Mariangel Patiño [facility]  TOPIC:  Process Group    Diagnoses:  296.89 Bipolar II Disorder Depressed.  296.32 (F33.1) Major Depressive Disorder, Recurrent Episode, Moderate _  300.02 (F41.1) Generalized Anxiety Disorder.      [facility] 55+ Program  TRACK: A2    NUMBER OF PARTICIPANTS: 8          Data:    Session content: At the start of this group, patients were invited to check in by identifying themselves, describing their current emotional status, and identifying issues to address in this group.   Area(s) of treatment focus addressed in this session included Symptom Management, Personal Safety, Abstinence/Relapse Prevention, Develop / Improve Independent Living Skills and Physical Health .    Aren reports increased mood stability. Denies S/I or safety issues. He feels lethargic in the morning related to his medication. He reports  medication compliance. Sleep is good. He continues to use coping skills for symptom management. He reports no issues or concerns over the weekend.      Therapeutic Interventions/Treatment Strategies:  Psychotherapist offered support, feedback and validation and reinforced use of skills. Treatment modalities used include Cognitive Behavioral Therapy.    Assessment:    Patient response:   Patient responded to session by accepting feedback, giving feedback, listening, focusing on goals, being attentive, accepting support and verbalizing understanding    Possible barriers to participation / learning include: and no barriers identified    Health Issues:   Yes: Feeling lethargic from Melatonin       Substance Use Review:   Substance Use: No active concerns identified.    Mental Status/Behavioral Observations  Appearance:   Appropriate   Eye Contact:   Good   Psychomotor Behavior: Normal   Attitude:   Cooperative  Interested Pleasant  Orientation:   All  Speech   Rate / Production: Normal    Volume:  Normal   Mood:    Calm mood. Less depressed. Denies lorrie.  Affect:    Appropriate   Thought Content:   Clear and Safety denies any current safety concerns including suicidal ideation, self-harm, and homicidal ideation  Thought Form:  Coherent  Goal Directed  Logical     Insight:    Good     Plan:     Safety Plan: No current safety concerns identified.  Recommended that patient call 911 or go to the local ED should there be a change in any of these risk factors.     Barriers to treatment: None identified    Patient Contracts (see media tab):  None    Substance Use: Not addressed in session     Continue or Discharge: Patient will continue in 55+ Program (55+) as planned. Patient is likely to benefit from learning and using skills as they work toward the goals identified in their treatment plan. Aren will continue for mood stabilization and symptom management education for mental health recovery.      Mariangel Patiño,  LIC  November 4, 2021

## 2021-11-08 ENCOUNTER — HOSPITAL ENCOUNTER (OUTPATIENT)
Dept: BEHAVIORAL HEALTH | Facility: CLINIC | Age: 56
End: 2021-11-08
Attending: PSYCHIATRY & NEUROLOGY
Payer: COMMERCIAL

## 2021-11-08 PROCEDURE — 90853 GROUP PSYCHOTHERAPY: CPT | Mod: GT,95 | Performed by: SOCIAL WORKER

## 2021-11-08 PROCEDURE — 90853 GROUP PSYCHOTHERAPY: CPT | Mod: GT,95

## 2021-11-08 NOTE — GROUP NOTE
Service Modality:  Video Visit     Telemedicine Visit: The patient's condition can be safely assessed and treated via synchronous audio and visual telemedicine encounter.       Reason for Telemedicine Visit: Services only offered telehealth and due to COVID-19.     Originating Site (Patient Location): Patient's home     Distant Site (Provider Location): Provider Remote Setting- Home Office     Consent:  The patient/guardian has verbally consented to: the potential risks and benefits of telemedicine (video visit) versus in person care; bill my insurance or make self-payment for services provided; and responsibility for payment of non-covered services.      Patient would like the video invitation sent by:  My Chart     Mode of Communication:  Video Conference via Medical Zoom     As the provider I attest to compliance with applicable laws and regulations related to telemedicine.    Process Group Note    PATIENT'S NAME: Dash Arizmendi  MRN:   [id number]  :   1965  ACCT. NUMBER: [id number]  DATE OF SERVICE: 21  START TIME:  9:00 AM  END TIME:  9:50 AM  FACILITATOR: Mariangel Patiño [facility]  TOPIC:  Process Group    Diagnoses:  296.89 Bipolar II Disorder Depressed.  296.32 (F33.1) Major Depressive Disorder, Recurrent Episode, Moderate _  300.02 (F41.1) Generalized Anxiety Disorder.      [facility] 55+ Program  TRACK: A2    NUMBER OF PARTICIPANTS: 7          Data:    Session content: At the start of this group, patients were invited to check in by identifying themselves, describing their current emotional status, and identifying issues to address in this group.   Area(s) of treatment focus addressed in this session included Symptom Management, Personal Safety, Community Resources/Discharge Planning, Abstinence/Relapse Prevention, Develop / Improve Independent Living Skills and Develop Socialization / Interpersonal Relationship Skills.    Aren reports less depressed and less anxious mood. He has  some rumination.  Denies S/I, SIB or safety issues. He processed planting 650 tulips in his yard to have a nice spring. He feels sore from this activity.  He reports interrupted sleep with a feeling of doom and gloom as if something bad will happen. Aren is engaged in Progressive Muscle relaxation. He reports medication compliance. He is using coping skills for symptom management.      Therapeutic Interventions/Treatment Strategies:  Psychotherapist offered support, feedback and validation and reinforced use of skills. Treatment modalities used include Cognitive Behavioral Therapy.    Assessment:    Patient response:   Patient responded to session by verbalizing understanding    Possible barriers to participation / learning include: and no barriers identified    Health Issues:   Yes: Sleep disturbance, Associated Psychological Distress       Substance Use Review:   Substance Use: No active concerns identified.    Mental Status/Behavioral Observations  Appearance:   Appropriate   Eye Contact:   Good   Psychomotor Behavior: Normal   Attitude:   Cooperative  Interested  Orientation:   All  Speech   Rate / Production: Normal    Volume:  Normal   Mood:    Anxious   Affect:    Appropriate   Thought Content:   Rumination and Safety denies any current safety concerns including suicidal ideation, self-harm, and homicidal ideation  Thought Form:  Coherent  Goal Directed  Logical     Insight:    Good     Plan:     Safety Plan: No current safety concerns identified.  Recommended that patient call 911 or go to the local ED should there be a change in any of these risk factors.     Barriers to treatment: None identified    Patient Contracts (see media tab):  None    Substance Use: Not addressed in session     Continue or Discharge: Patient will continue in 55+ Program (55+) as planned. Patient is likely to benefit from learning and using skills as they work toward the goals identified in their treatment plan. Aren will continue  for mood stabilization and symptom management education for mental health recovery.      Mariangel Patiño, [facility]  November 8, 2021

## 2021-11-08 NOTE — GROUP NOTE
Psychoeducation Group Note    PATIENT'S NAME: Dash Arizmendi  MRN:   [id number]  :   1965  ACCT. NUMBER: [id number]  DATE OF SERVICE: 21  START TIME: 10:00 AM  END TIME: 10:50 AM  FACILITATOR: Mary Anne Olson LICSW  TOPIC: CHUCKY RN Group: Health Maintenance  [facility] 55+ Program  TRACK: A2                              Service Modality:  Video Visit     Telemedicine Visit: The patient's condition can be safely assessed and treated via synchronous audio and visual telemedicine encounter.      Reason for Telemedicine Visit: Services only offered telehealth    Originating Site (Patient Location): Patient's home    Distant Site (Provider Location): [facility] MENTAL HEALTH & ADDICTION SERVICES    Consent:  The patient/guardian has verbally consented to: the potential risks and benefits of telemedicine (video visit) versus in person care; bill my insurance or make self-payment for services provided; and responsibility for payment of non-covered services.     Patient would like the video invitation sent by:  My Chart    Mode of Communication:  Video Conference via Medical Zoom    As the provider I attest to compliance with applicable laws and regulations related to telemedicine.         NUMBER OF PARTICIPANTS: 6    Summary of Group / Topics Discussed:  Health Maintenance: Discharge planning/Community resources: Patients worked on completing an instructor-facilitated discharge planning activity. Discharge planning begins for all patients after admission. Competent discharge planning promotes a successful transition and decreases the likelihood of mental health relapse. In this group, all dimensions of wellness were reviewed to assess for needs/discharge readiness. These dimensions included: physical, emotional, occupational/productivity, environmental, social, spiritual, intellectual, and financial. Patients worked on completing/updating their discharge planning and identifying their treatment  needs prior to time of discharge.     Patient Session Goals / Objectives:  ? Identified unmet treatment needs to accomplish before discharge  ? Completed all dimensions of the discharge planning packet  ? Participated in the planning process, make phone calls, set up appointments, got connected with community resources, followed up with treatment team as needed         Patient Participation / Response:  Moderately participated, sharing some personal reflections / insights and adequately adequately received / provided feedback with other participants.    Demonstrated understanding of topics discussed through group discussion and participation and Identified / Expressed personal readiness to practice skills    Treatment Plan:  Patient has a current master individualized treatment plan.  See Epic treatment plan for more information.    Mary Anne Kee, LICSW

## 2021-11-08 NOTE — GROUP NOTE
[address]  Video Visit Progress Note    Marquez Waterman verbally consents to a Telemedicine Visit on 06/10/20. This visit is conducted using Telemedicine with live, interactive audio and video.     Kaila Service Modality:  Video Visit     Telemedicine Visit: The patient's condition can be safely assessed and treated via synchronous audio and visual telemedicine encounter.       Reason for Telemedicine Visit: Services only offered telehealth and due to COVID-19.     Originating Site (Patient Location): Patient's home     Distant Site (Provider Location): Provider Remote Setting- Home Office     Consent:  The patient/guardian has verbally consented to: the potential risks and benefits of telemedicine (video visit) versus in person care; bill my insurance or make self-payment for services provided; and responsibility for payment of non-covered services.      Patient would like the video invitation sent by:  My Chart     Mode of Communication:  Video Conference via Medical Zoom     As the provider I attest to compliance with applicable laws and regulations related to telemedicine.    Psychoeducation Group Note    PATIENT'S NAME: Dash Arizmendi  MRN:   [id number]  :   1965  ACCT. NUMBER: [id number]  DATE OF SERVICE: 21  START TIME: 11:00 AM  END TIME: 11:50 AM  FACILITATOR: Mariangel Patiño LICSW  TOPIC:  RN Group: Health Maintenance  [facility] 55+ Program  TRACK: A2    NUMBER OF PARTICIPANTS: 7    Summary of Group / Topics Discussed:  Health Maintenance: Goal Setting: Meaningful goals can bring a sense of direction and purpose in life.  They also highlight our most important values. Patients were assisted by instructor to identify short term goals to promote their mental health recovery and improve overall health and wellness. Patients were educated on SMART goal setting framework as a strategy to increase outcomes and promote success.    Patient Session Goals / Objectives:  ? Explained the key concepts of SMART goal setting framework  ? Identified three goals successfully using SMART goal setting framework  ? Reviewed concept of balance in wellness as it pertains to goal setting         Patient Participation / Response:  Fully participated with the group by sharing personal reflections / insights and openly received / provided feedback with other participants.    Demonstrated understanding of topics discussed through group discussion and participation and Verbalized understanding of health maintenance topic    Treatment Plan:  Patient has a current master individualized treatment plan.  See Epic treatment plan for more information.    Mariangel Patiño, LICSW       tobacco: Never Used    Substance and Sexual Activity      Alcohol use: Yes        Frequency: Monthly or less        Comment: rarely      Drug use: No      Sexual activity: Not on file      FAMILY HISTORY:   Family History   Problem Relation Age of Onset 1.025  1.005 - 1.030 Final   • Blood Urine 11/13/2019 TRACE  Negative Final   • PH Urine 11/13/2019 7.0  4.5 - 8.0 Final   • Protein Urine 11/13/2019 NEG  Negative/Trace mg/dL Final   • Urobilinogen Urine 11/13/2019 0.2  0.0 - 1.9 mg/dL Final   • Nitrite U to evaluate her further for potential demyelinating disease as most of her symptoms are unilateral.  If the MRIs are negative, then I would recommend she restart formal physical therapy and have an EMG performed of the lower and upper extremities.   I will weakness    Pablo Meneses MD  Physical Medicine and Rehabilitation/Sports Medicine  [facility] [place]

## 2021-11-10 ENCOUNTER — HOSPITAL ENCOUNTER (OUTPATIENT)
Dept: BEHAVIORAL HEALTH | Facility: CLINIC | Age: 56
End: 2021-11-10
Attending: PSYCHIATRY & NEUROLOGY
Payer: COMMERCIAL

## 2021-11-10 PROCEDURE — 90853 GROUP PSYCHOTHERAPY: CPT | Mod: GT,95

## 2021-11-10 PROCEDURE — 90853 GROUP PSYCHOTHERAPY: CPT | Mod: GT,95 | Performed by: SOCIAL WORKER

## 2021-11-10 NOTE — GROUP NOTE
Service Modality:  Video Visit     Telemedicine Visit: The patient's condition can be safely assessed and treated via synchronous audio and visual telemedicine encounter.       Reason for Telemedicine Visit: Services only offered telehealth and due to COVID-19.     Originating Site (Patient Location): Patient's home     Distant Site (Provider Location): Provider Remote Setting- Home Office     Consent:  The patient/guardian has verbally consented to: the potential risks and benefits of telemedicine (video visit) versus in person care; bill my insurance or make self-payment for services provided; and responsibility for payment of non-covered services.      Patient would like the video invitation sent by:  My Chart     Mode of Communication:  Video Conference via Medical Zoom     As the provider I attest to compliance with applicable laws and regulations related to telemedicine.    Process Group Note    PATIENT'S NAME: Dash Arizmendi  MRN:   [id number]  :   1965  ACCT. NUMBER: [id number]  DATE OF SERVICE: 11/10/21  START TIME: 10:00 AM  END TIME: 10:50 AM  FACILITATOR: Mariangel Patiño [facility]  TOPIC:  Process Group    Diagnoses:  296.89 Bipolar II Disorder Depressed.  396.32 (F33.1) Major Depressive Disorder, Recurrent Episode, Moderate _  300.02 (F41.1) Generalized Anxiety Disorder.      [facility] 55+ Program  TRACK: A2    NUMBER OF PARTICIPANTS: 8          Data:    Session content: At the start of this group, patients were invited to check in by identifying themselves, describing their current emotional status, and identifying issues to address in this group.   Area(s) of treatment focus addressed in this session included Symptom Management, Personal Safety, Community Resources/Discharge Planning, Abstinence/Relapse Prevention, Develop / Improve Independent Living Skills and Develop Socialization / Interpersonal Relationship Skills.    Aren reports elevated mood symptoms: anxiety, sad and  depressed with interrupted sleep. Denies S/I or safety issues. He processed getting a call from his niece in [place] that his younger brother passed away. He is awaiting a call back from his niece. He and his wife are discussing their plans. His wife is being supportive. He was able to identify his emotions and is using coping skills.       Therapeutic Interventions/Treatment Strategies:  Psychotherapist offered support, feedback and validation and reinforced use of skills. Treatment modalities used include Cognitive Behavioral Therapy.    Assessment:    Patient response:   Patient responded to session by verbalizing understanding    Possible barriers to participation / learning include: and no barriers identified    Health Issues:   Yes: Sleep disturbance, Associated Psychological Distress       Substance Use Review:   Substance Use: No active concerns identified.    Mental Status/Behavioral Observations  Appearance:   Appropriate   Eye Contact:   Good   Psychomotor Behavior: Normal   Attitude:   Cooperative  Interested Pleasant  Orientation:   All  Speech   Rate / Production: Normal    Volume:  Normal   Mood:    Anxious  Depressed  Sad   Affect:    Appropriate  Worrisome   Thought Content:   Clear and Safety denies any current safety concerns including suicidal ideation, self-harm, and homicidal ideation  Thought Form:  Coherent  Goal Directed  Logical     Insight:    Good     Plan:     Safety Plan: No current safety concerns identified.  Recommended that patient call 911 or go to the local ED should there be a change in any of these risk factors.     Barriers to treatment: None identified    Patient Contracts (see media tab):  None    Substance Use: Not addressed in session     Continue or Discharge: Patient will continue in 55+ Program (55+) as planned. Patient is likely to benefit from learning and using skills as they work toward the goals identified in their treatment plan. Naveedn will continue for mood  stabilization and symptom management education for mental health recovery.      Mariangel Patiño, [facility]  November 10, 2021

## 2021-11-10 NOTE — GROUP NOTE
Psychoeducation Group Note    PATIENT'S NAME: Dash Arizmendi  MRN:   [id number]  :   1965  ACCT. NUMBER: [id number]  DATE OF SERVICE: 11/10/21  START TIME: 11:00 AM  END TIME: 11:50 AM  FACILITATOR: Mary Anne Olson LICSW  TOPIC: CHUCKY RN Group: [facility] 55+ Program  TRACK: A2                              Service Modality:  Video Visit     Telemedicine Visit: The patient's condition can be safely assessed and treated via synchronous audio and visual telemedicine encounter.      Reason for Telemedicine Visit: Services only offered telehealth    Originating Site (Patient Location): Patient's home    Distant Site (Provider Location): [facility] MENTAL HEALTH & ADDICTION SERVICES    Consent:  The patient/guardian has verbally consented to: the potential risks and benefits of telemedicine (video visit) versus in person care; bill my insurance or make self-payment for services provided; and responsibility for payment of non-covered services.     Patient would like the video invitation sent by:  My Chart    Mode of Communication:  Video Conference via Medical Zoom    As the provider I attest to compliance with applicable laws and regulations related to telemedicine.         NUMBER OF PARTICIPANTS: 7    Summary of Group / Topics Discussed:  Foundations of Health: Exercise: Why exercise: Patients evaluated and discussed their current exercise behaviors/physical activity routine and identified barriers/obstacles to meeting daily physical activity recommendations. Patients were educated on the four types of exercise: endurance, strength, balance, and flexibility. Patients were educated on the benefits of getting daily physical activity, safety tips for beginning an exercise program, and fitness goal setting strategies.     Patient Session Goals / Objectives:  ? Explained the emotional and physical benefits of exercise  ? Identified how exercise and activity affects bodily  function  ? Listed ways to incorporate exercise into daily routine      Patient Participation / Response:  Moderately participated, sharing some personal reflections / insights and adequately adequately received / provided feedback with other participants.    Demonstrated understanding of topics discussed through group discussion and participation and Identified / Expressed personal readiness to practice skills    Treatment Plan:  Patient has a current master individualized treatment plan.  See Epic treatment plan for more information.    Mary Anne Kee, LICSW

## 2021-11-10 NOTE — GROUP NOTE
Psychoeducation Group Note    PATIENT'S NAME: Dash Arizmendi  MRN:   [id number]  :   1965  ACCT. NUMBER: [id number]  DATE OF SERVICE: 11/10/21  START TIME:  9:00 AM  END TIME:  9:50 AM  FACILITATOR: Mary Anne Olson LICSW  TOPIC: CHUCKY RN Group: Health Maintenance  [facility] 55+ Program  TRACK: A2                              Service Modality:  Video Visit     Telemedicine Visit: The patient's condition can be safely assessed and treated via synchronous audio and visual telemedicine encounter.      Reason for Telemedicine Visit: Services only offered telehealth    Originating Site (Patient Location): Patient's home    Distant Site (Provider Location): [facility] MENTAL HEALTH & ADDICTION SERVICES    Consent:  The patient/guardian has verbally consented to: the potential risks and benefits of telemedicine (video visit) versus in person care; bill my insurance or make self-payment for services provided; and responsibility for payment of non-covered services.     Patient would like the video invitation sent by:  My Chart    Mode of Communication:  Video Conference via Medical Zoom    As the provider I attest to compliance with applicable laws and regulations related to telemedicine.         NUMBER OF PARTICIPANTS: 8    Summary of Group / Topics Discussed:  Health Maintenance: Discharge planning/Community resources part 2: Patients worked on completing an instructor-facilitated discharge planning activity. Discharge planning begins for all patients after admission. Competent discharge planning promotes a successful transition and decreases the likelihood of mental health relapse. In this group, all dimensions of wellness were reviewed to assess for needs/discharge readiness. These dimensions included: physical, emotional, occupational/productivity, environmental, social, spiritual, intellectual, and financial. Patients worked on completing/updating their discharge planning and identifying their  treatment needs prior to time of discharge.     Patient Session Goals / Objectives:  ? Identified unmet treatment needs to accomplish before discharge  ? Completed all dimensions of the discharge planning packet  ? Participated in the planning process, make phone calls, set up appointments, got connected with community resources, followed up with treatment team as needed         Patient Participation / Response:  Fully participated with the group by sharing personal reflections / insights and openly received / provided feedback with other participants.    Demonstrated understanding of topics discussed through group discussion and participation and Identified / Expressed personal readiness to practice skills    Treatment Plan:  Patient has a current master individualized treatment plan.  See Epic treatment plan for more information.    Mary Anne Kee, LICSW

## 2021-11-11 ENCOUNTER — HOSPITAL ENCOUNTER (OUTPATIENT)
Dept: BEHAVIORAL HEALTH | Facility: CLINIC | Age: 56
End: 2021-11-11
Attending: PSYCHIATRY & NEUROLOGY
Payer: COMMERCIAL

## 2021-11-11 PROCEDURE — 90853 GROUP PSYCHOTHERAPY: CPT | Mod: GT,95 | Performed by: SOCIAL WORKER

## 2021-11-11 PROCEDURE — 90853 GROUP PSYCHOTHERAPY: CPT | Mod: GT,95

## 2021-11-11 NOTE — GROUP NOTE
Service Modality:  Video Visit     Telemedicine Visit: The patient's condition can be safely assessed and treated via synchronous audio and visual telemedicine encounter.       Reason for Telemedicine Visit: Services only offered telehealth and due to COVID-19.     Originating Site (Patient Location): Patient's home     Distant Site (Provider Location): Provider Remote Setting- Home Office     Consent:  The patient/guardian has verbally consented to: the potential risks and benefits of telemedicine (video visit) versus in person care; bill my insurance or make self-payment for services provided; and responsibility for payment of non-covered services.      Patient would like the video invitation sent by:  My Chart     Mode of Communication:  Video Conference via Medical Zoom     As the provider I attest to compliance with applicable laws and regulations related to telemedicine.    Psychotherapy Group Note    PATIENT'S NAME: Dash Arizmendi  MRN:   [id number]  :   1965  ACCT. NUMBER: [id number]  DATE OF SERVICE: 21  START TIME: 11:00 AM  END TIME: 11:50 AM  FACILITATOR: Mariangel Patiño [facility]  TOPIC:  EBP Group: [facility] 55+ Program  TRACK: A2    NUMBER OF PARTICIPANTS: 8    Summary of Group / Topics Discussed:  Mindfulness: What is Mindfulness: Patients received an overview on what mindfulness is and how mindfulness can benefit general health, mental health symptoms, and stressors. The history of mindfulness, its application to mental health therapies, and key concepts were also discussed. Patients discussed current awareness, knowledge, and practice of mindfulness skills. Patients also discussed barriers to mindfulness practice.     Patient Session Goals / Objectives:    Demonstrated understanding of key concepts and application to daily life    Identified when/how to use mindfulness     Resolved barriers to practice    Identified plan to use mindfulness in daily  life      Patient Participation / Response:  Fully participated with the group by sharing personal reflections / insights and openly received / provided feedback with other participants.    Demonstrated understanding of topics discussed through group discussion and participation and Demonstrated understanding of mindfulness skills and benefits of practice    Treatment Plan:  Patient has a current master individualized treatment plan.  See Epic treatment plan for more information.    Mariangel Patiño, LICSW

## 2021-11-11 NOTE — GROUP NOTE
Psychotherapy Group Note    PATIENT'S NAME: Dash Arizmendi  MRN:   [id number]  :   1965  ACCT. NUMBER: [id number]  DATE OF SERVICE: 21  START TIME: 10:00 AM  END TIME: 10:50 AM  FACILITATOR: Mary Anne Olson LICSW  TOPIC: MH EBP Group: Behavioral Activation  [facility] 55+ Program  TRACK: A2                              Service Modality:  Video Visit     Telemedicine Visit: The patient's condition can be safely assessed and treated via synchronous audio and visual telemedicine encounter.      Reason for Telemedicine Visit: Services only offered telehealth    Originating Site (Patient Location): Patient's home    Distant Site (Provider Location): [facility] MENTAL HEALTH & ADDICTION SERVICES    Consent:  The patient/guardian has verbally consented to: the potential risks and benefits of telemedicine (video visit) versus in person care; bill my insurance or make self-payment for services provided; and responsibility for payment of non-covered services.     Patient would like the video invitation sent by:  My Chart    Mode of Communication:  Video Conference via Medical Zoom    As the provider I attest to compliance with applicable laws and regulations related to telemedicine.         NUMBER OF PARTICIPANTS: 8    Summary of Group / Topics Discussed:  Behavioral Activation: Activity Scheduling/Routines:Patients explored how they currently spend their time, and how specific behaviors impact thoughts and feelings.  The group explored the effect of negative and positive activities on mood states and thought patterns.  Patients identified activities that help to improve mood and thinking patterns, and developed a plan to implement positive activities between sessions.      Patient Session Goals / Objectives:    Identify impact of current behaviors on thoughts and mood    Identify 2-3 behavioral changes that could have a positive impact on thoughts and mood    Prepare to make desired behavioral  change: Create a change plan / activity schedule      Patient Participation / Response:  Fully participated with the group by sharing personal reflections / insights and openly received / provided feedback with other participants.    Demonstrated understanding of topics discussed through group discussion and participation, Expressed understanding of the relationship between behaviors, thoughts, and feelings and Identified / Expressed personal readiness to make behavioral change    Treatment Plan:  Patient has a current master individualized treatment plan.  See Epic treatment plan for more information.    Mary Anne Kee, MARCOSSW

## 2021-11-11 NOTE — GROUP NOTE
Service Modality:  Video Visit     Telemedicine Visit: The patient's condition can be safely assessed and treated via synchronous audio and visual telemedicine encounter.       Reason for Telemedicine Visit: Services only offered telehealth and due to COVID-19.     Originating Site (Patient Location): Patient's home     Distant Site (Provider Location): Provider Remote Setting- Home Office     Consent:  The patient/guardian has verbally consented to: the potential risks and benefits of telemedicine (video visit) versus in person care; bill my insurance or make self-payment for services provided; and responsibility for payment of non-covered services.      Patient would like the video invitation sent by:  My Chart     Mode of Communication:  Video Conference via Medical Zoom     As the provider I attest to compliance with applicable laws and regulations related to telemedicine.    Process Group Note    PATIENT'S NAME: Dash Arizmendi  MRN:   [id number]  :   1965  ACCT. NUMBER: [id number]  DATE OF SERVICE: 21  START TIME:  9:00 AM  END TIME:  9:50 AM  FACILITATOR: Mariangel Patiño [facility]  TOPIC:  Process Group    Diagnoses:  296.89 Bipolar II Disorder Depressed.  296.32 (F33.1) Major Depressive Disorder, Recurrent Episode, Moderate _  300.02 (F41.1) Generalized Anxiety Disorder.      [facility] 55+ Program  TRACK: A2    NUMBER OF PARTICIPANTS: 8          Data:    Session content: At the start of this group, patients were invited to check in by identifying themselves, describing their current emotional status, and identifying issues to address in this group.   Area(s) of treatment focus addressed in this session included Symptom Management, Personal Safety, Community Resources/Discharge Planning, Abstinence/Relapse Prevention, Develop / Improve Independent Living Skills and Develop Socialization / Interpersonal Relationship Skills.    Abisai reports sad mood, feel preoccupied in thinking related  to the passing of his brother. He reports low appetite and interrupted sleep. Denies S/I or safety issues. He is using coping skills for symptom management including mindfulness. He will consult with his outpatient therapist.       Therapeutic Interventions/Treatment Strategies:  Psychotherapist offered support, feedback and validation and reinforced use of skills. Treatment modalities used include Cognitive Behavioral Therapy.    Assessment:    Patient response:   Patient responded to session by verbalizing understanding    Possible barriers to participation / learning include: and no barriers identified    Health Issues:   Yes: Sleep disturbance, Associated Psychological Distress       Substance Use Review:   Substance Use: No active concerns identified.    Mental Status/Behavioral Observations  Appearance:   Appropriate   Eye Contact:   Good   Psychomotor Behavior: Normal   Attitude:   Cooperative  Interested  Orientation:   All  Speech   Rate / Production: Normal    Volume:  Normal   Mood:    Sad   Affect:    Appropriate   Thought Content:   Rumination and Safety denies any current safety concerns including suicidal ideation, self-harm, and homicidal ideation  Thought Form:  Coherent  Goal Directed  Logical     Insight:    Good     Plan:     Safety Plan: No current safety concerns identified.  Recommended that patient call 911 or go to the local ED should there be a change in any of these risk factors.     Barriers to treatment: None identified    Patient Contracts (see media tab):  None    Substance Use: Not addressed in session     Continue or Discharge: Patient will continue in 55+ Program (55+) as planned. Patient is likely to benefit from learning and using skills as they work toward the goals identified in their treatment plan. Aren will continue for mood stabilization and symptom management education for mental health recovery.      Mariangel Patiño, [facility]  November 11, 2021

## 2021-11-11 NOTE — PROGRESS NOTES
Psychiatry Provider Staffing Note    Late entry-case discussed 11/10    Met with treatment team to discuss case, progress.    Dash Arizmendi is a 56 year old male enrolled in 55+ Intensive Outpatient track A2.      Medications:   Current Outpatient Medications   Medication     cariprazine (VRAYLAR) 3 MG CAPS capsule     lamoTRIgine (LAMICTAL) 25 MG tablet     levomilnacipran (FETZIMA ER) 40 MG 24 hr capsule     lithium ER (LITHOBID) 300 MG CR tablet     QUEtiapine ER (SEROQUEL XR) 400 MG 24 hr tablet     RisperiDONE (RISPERDAL PO)     SERTRALINE HCL PO     Topiramate (TOPAMAX PO)     No current facility-administered medications for this encounter.         Diagnoses reviewed and include:  Patient Active Problem List   Diagnosis     Bipolar 1 disorder, depressed, moderate (H)     Bipolar 2 disorder (H)       Pertinent/updates:    Bipolar 2    Care team notes and discussion:    Ran out of medications for 4 days, no acute events but was uncomfortable    Has refilled, now back on usual regimen, feeling better    No suicidal ideation, no other safety concerns.      Patient continues to meet criteria for program.  Continue plan of care.    Flex Casey MD on 11/11/2021 at 9:48 AM

## 2021-11-15 ENCOUNTER — HOSPITAL ENCOUNTER (OUTPATIENT)
Dept: BEHAVIORAL HEALTH | Facility: CLINIC | Age: 56
End: 2021-11-15
Attending: PSYCHIATRY & NEUROLOGY
Payer: COMMERCIAL

## 2021-11-15 DIAGNOSIS — F31.32 BIPOLAR 1 DISORDER, DEPRESSED, MODERATE (H): Primary | ICD-10-CM

## 2021-11-15 PROCEDURE — 90853 GROUP PSYCHOTHERAPY: CPT | Mod: GT,95 | Performed by: SOCIAL WORKER

## 2021-11-15 PROCEDURE — 99214 OFFICE O/P EST MOD 30 MIN: CPT | Mod: GT | Performed by: PSYCHIATRY & NEUROLOGY

## 2021-11-15 PROCEDURE — 90853 GROUP PSYCHOTHERAPY: CPT | Mod: GT,95

## 2021-11-15 RX ORDER — LORAZEPAM 0.5 MG/1
0.5 TABLET ORAL 4 TIMES DAILY PRN
Qty: 8 TABLET | Refills: 0 | Status: SHIPPED | OUTPATIENT
Start: 2021-11-15

## 2021-11-15 RX ORDER — LEVOTHYROXINE SODIUM 50 UG/1
1 TABLET ORAL DAILY
COMMUNITY
Start: 2021-08-16 | End: 2022-08-16

## 2021-11-15 RX ORDER — LORAZEPAM 0.5 MG/1
0.5 TABLET ORAL 4 TIMES DAILY PRN
COMMUNITY
Start: 2020-11-19 | End: 2021-11-15

## 2021-11-15 RX ORDER — CHOLECALCIFEROL (VITAMIN D3) 50 MCG
1 TABLET ORAL DAILY
COMMUNITY

## 2021-11-15 RX ORDER — NALTREXONE HYDROCHLORIDE 50 MG/1
50 TABLET, FILM COATED ORAL DAILY
COMMUNITY
Start: 2021-10-20

## 2021-11-15 NOTE — GROUP NOTE
Psychotherapy Group Note    PATIENT'S NAME: Dash Arizmendi  MRN:   [id number]  :   1965  ACCT. NUMBER: [id number]  DATE OF SERVICE: 11/15/21  START TIME: 10:00 AM  END TIME: 10:50 AM  FACILITATOR: Mary Anne Olson LICSW  TOPIC: MH EBP Group: Symptom Awareness  [facility] 55+ Program  TRACK: A2                              Service Modality:  Video Visit     Telemedicine Visit: The patient's condition can be safely assessed and treated via synchronous audio and visual telemedicine encounter.      Reason for Telemedicine Visit: Services only offered telehealth    Originating Site (Patient Location): Patient's home    Distant Site (Provider Location): [facility] MENT[facility]    Consent:  The patient/guardian has verbally consented to: the potential risks and benefits of telemedicine (video visit) versus in person care; bill my insurance or make self-payment for services provided; and responsibility for payment of non-covered services.     Patient would like the video invitation sent by:  My Chart    Mode of Communication:  Video Conference via Medical Zoom    As the provider I attest to compliance with applicable laws and regulations related to telemedicine.         NUMBER OF PARTICIPANTS: 8    Summary of Group / Topics Discussed:  Symptom Awareness: Mood Disorders: Patients received a general overview of depression and how it relates to their current symptoms. The purpose is to promote understanding of their diagnoses and how it impacts their functioning. Patients reviewed their current awareness of symptoms and diagnoses. Patients received information regarding diagnoses, etiology, cultural, and environmental factors as well as impact on functioning.     Patient Session Goals / Objectives:    Discussed patient individual symptoms and experiences    Reviewed diagnostic criteria and etiology of diagnoses       Patient Participation / Response:  Moderately participated, sharing  some personal reflections / insights and adequately adequately received / provided feedback with other participants.    Demonstrated understanding of topics discussed through group discussion and participation and Demonstrated understanding of how information regarding symptoms can assist in management of symptoms    Treatment Plan:  Patient has a current master individualized treatment plan.  See Epic treatment plan for more information.    Mary Anne Kee, MARCOSSW

## 2021-11-15 NOTE — PROGRESS NOTES
"[facility]   Adult Mental Health Outpatient Programs  Provider Interval History Note    Program: 55+ Intensive Outpatient   Track: A2    PATIENT'S NAME: Dash Arizmendi  MRN:   [id number]  :   1965  ACCT. NUMBER: [id number]  DATE OF SERVICE: 11/15/21  CALL/VIDEO START TIME: 0900  CALL/VIDEO END TIME: 0925      Outpatient Providers:  Psychiatrist/Provider: Dr. Suarez (now on extended leave since July, has been seeing colleagues), will see new permanent psychiatrist Dr. Burnham   Psychotherapist: Donn Mosley      Interval History:  \"I ended up stopping the Vraylar.\" Aren presents for today for follow-up and ongoing program supervision. Endorses also stopped Cytomel per PCP recommendations (was euthyroid on lab checks). Was taking Benadryl for adverse effects from Vraylar; has also stopped that.    Brother  unexpectedly recently. This is understandably occupying a lot of his thoughts. Is trying to decide if he will be going to [place] for the memorial, trying to get a COVID booster before traveling.    Symptoms:    Sleep: still insomnia, but naps during the day    Appetite still poor    \"brain fog,\" still poor concentration, which is his primary concern as it is incompatible with work    Ongoing self-injury (cutting). Is tracking frequency with his therapist  o Naltrexone started for cutting last month. Has not seen notable decrease in frequency of non-suicidal self-injury     Substance use:    none    Reactions/thoughts about program:    Was in PHP in person years ago, felt community was particularly helpful    Remote feels less supportive    Other concerns:    History propranolol helped with tremors      Risk Assessment:    Suicidal ideation: has chronic thoughts with no stated plan, somewhat exacerbated    Thoughts of non-suicidal self-injury: endorsed    Recent self-injurious behavior: endorsed    Homicidal ideation: denied    Other safety " "concerns: denied      Medications:  Medications reviewed and updated in [facility] with patient today. Patient is taking medications as prescribed.     Adverse effects: tremor with lithium, otherwise denies        Laboratory Results:  Most recent labs reviewed and no new labs.     Metrics:  PHQ-9 scores:   PHQ-9 SCORE 10/25/2018 10/29/2018 10/11/2021 10/13/2021   PHQ-9 Total Score MyChart - - 22 (Severe depression) 23 (Severe depression)   PHQ-9 Total Score 8 9 22 23       JUAN PABLO-7 scores:   JUAN PABLO-7 SCORE 10/29/2018 10/11/2021 10/13/2021   Total Score - 14 (moderate anxiety) 14 (moderate anxiety)   Total Score 6 14 14       CSSR-S: No flowsheet data found.      Mental Status Examination (limited due to video virtual visit format):  Vital Signs: There were no vitals taken for this virtual visit.  Appearance: adequately groomed, appears stated age, and in no apparent distress.  Attitude: cooperative   Eye Contact: good to the extent that can be determined in a video visit  Muscle Strength and Tone: no gross abnormalities based on remote observation  Psychomotor Behavior:  no evidence of tardive dyskinesia, dystonia, or tics based on remote observation  Gait and Station: normal, no gross abnormalities based on remote observation  Speech: clear, coherent, normal prosody, regular rate, regular rhythm and fluent  Associations: No loosening of associations  Thought Process: coherent and goal directed  Thought Content: no evidence of suicidal ideation or homicidal ideation, no evidence of psychotic thought, thoughts of self-harm, which are increased, no auditory hallucinations present and no visual hallucinations present  Mood: \"depressed\"  Affect: mood congruent, intensity is normal, constricted mobility, restricted range and reactive  Insight: good  Judgment: intact, adequate for safety  Impulse Control: intact  Oriented to: time, place, person and situation  Attention Span and Concentration: normal  Language: Intact  Recent and " Remote Memory: intact to interview. Not formally assessed. No amnesia.  Fund of Knowledge: appropriate        Diagnosis/es:  296.52 Bipolar I Disorder Current or Most Recent Episode Depressed, Moderate  300.02 (F41.1) Generalized Anxiety Disorder    Assessment:  Aren presents today for follow up. Endorses recent low mood as a result of the death of his brother, which is a healthy reaction to a loss. Concentration remains poor. Has tried buspirone in the past with ill effect. Has discussed methylphenidate with another provider, I discussed it with him again today. Ultimately decided not to make medication changes today given other stressors and factors.      Bipolar disorder  o Overall stable  o No change/increase in lorrie since stopping cariprazine  o Concentration remains poor  o No change to medication today  o Consider stimulant in the future  o Encouraged sleep hygeine as improvement in rest will help concentration      Anxiety   o Overall stable   o Exacerbated by loss   o Refill lorazepam for help with flying,   - Discussed that use of benzos to suppress grief would be counterproductive and that he should try half a tablet where possible      Ongoing programmatic care:  o Continues to benefit  o Continues to meet criteria  o Continue program      I feel this patient does not meet criteria for an involuntary hold and is appropriate for treatment at an outpatient level of care.      Treatment Plan:  Medications:    Continue at current dose/schedule:   o Lorazepam (8 tab supply sent today)  o levomilnacipran   o Lithium   o Naltrexone    Continue all other medications as reviewed per electronic medical record today    Continue therapy as planned:    Enrolled in 55+ Intensive Outpatient    Continue with individual therapist as appropriate    Safety plan reviewed:    To the Emergency Department as needed or call after hours crisis line at [phone] or [phone]. [place] Crisis Text Line: Text [place] to  300195 or Suicide LifeLine Chat: suicidepreventionlifeline.org/chat    Follow-up:     schedule an appointment with me or another program provider in approximately 4 weeks or sooner if needed.  Call [facility] at [phone] to schedule.    Follow up with outpatient provider as planned or sooner if needed for acute medical concerns.    Questions or concerns:    Call the psychiatric nurse line with medication questions or concerns at [phone].    Nortal ASt may be used to communicate with your provider, but this is not intended to be used for emergencies.        Treatment Objective(s) Addressed in This Session:  The purpose of today's virtual visit is for this writer to provide oversight of patient's care while receiving program services. Specific treatment goals addressed included personal safety, symptoms stabilization and management, wellness and mental health, and community resources/discharge planning.     This author or another program provider will follow up with the patient as noted above.     Patient continues to meet criteria for recommended level of care: 55+ Intensive Outpatient  Patient would be at reasonable risk of requiring a higher level of care in the absence of current services.    Patient agrees with the current plan of care.    Flex Casey MD  11/15/21      Visit Details:  Type of service:  Video Visit    Start/End Time: see above    Originating Location (pt. Location): Home in [place]    Distant Location (provider location): Provider Remote Setting- Home Office    Platform used for Video Visit: Thea    Physician has received verbal consent for a Video Visit from the patient? Yes    35 minutes spent on the date of the encounter doing chart review, patient visit, documentation and discussion with other provider(s)     This document completed in part using Dragon Medical One dictation software.  Please excuse any inadvertent word or phrase substitutions.

## 2021-11-15 NOTE — GROUP NOTE
Service Modality:  Video Visit     Telemedicine Visit: The patient's condition can be safely assessed and treated via synchronous audio and visual telemedicine encounter.       Reason for Telemedicine Visit: Services only offered telehealth and due to COVID-19.     Originating Site (Patient Location): Patient's home     Distant Site (Provider Location): Provider Remote Setting- Home Office     Consent:  The patient/guardian has verbally consented to: the potential risks and benefits of telemedicine (video visit) versus in person care; bill my insurance or make self-payment for services provided; and responsibility for payment of non-covered services.      Patient would like the video invitation sent by:  My Chart     Mode of Communication:  Video Conference via Medical Zoom     As the provider I attest to compliance with applicable laws and regulations related to telemedicine.    Psychoeducation Group Note    PATIENT'S NAME: Dash Arizmendi  MRN:   [id number]  :   1965  ACCT. NUMBER: [id number]  DATE OF SERVICE: 11/15/21  START TIME: 11:00 AM  END TIME: 11:50 AM  FACILITATOR: Mariangel Patiño LICSW  TOPIC: MH EBP Group: Health Maintenance  [facility] 55+ Program  TRACK: A2    NUMBER OF PARTICIPANTS: 8    Summary of Group / Topics Discussed:  Health Maintenance: Goal Setting: Meaningful goals can bring a sense of direction and purpose in life.  They also highlight our most important values. Patients were assisted by instructor to identify short term goals to promote their mental health recovery and improve overall health and wellness. Patients were educated on SMART goal setting framework as a strategy to increase outcomes and promote success.    Patient Session Goals / Objectives:  ? Explained the key concepts of SMART goal setting framework  ? Identified three goals successfully using SMART goal setting framework  ? Reviewed concept of balance in wellness as it pertains to goal setting         Patient Participation / Response:  Fully participated with the group by sharing personal reflections / insights and openly received / provided feedback with other participants.    Demonstrated understanding of topics discussed through group discussion and participation and Verbalized understanding of health maintenance topic    Treatment Plan:  Patient has a current master individualized treatment plan.  See Epic treatment plan for more information.    Mariangel Patiño, LICSW

## 2021-11-15 NOTE — GROUP NOTE
Service Modality:  Video Visit     Telemedicine Visit: The patient's condition can be safely assessed and treated via synchronous audio and visual telemedicine encounter.       Reason for Telemedicine Visit: Services only offered telehealth and due to COVID-19.     Originating Site (Patient Location): Patient's home     Distant Site (Provider Location): Provider Remote Setting- Home Office     Consent:  The patient/guardian has verbally consented to: the potential risks and benefits of telemedicine (video visit) versus in person care; bill my insurance or make self-payment for services provided; and responsibility for payment of non-covered services.      Patient would like the video invitation sent by:  My Chart     Mode of Communication:  Video Conference via Medical Zoom     As the provider I attest to compliance with applicable laws and regulations related to telemedicine.    Process Group Note    PATIENT'S NAME: Dash Arizmendi  MRN:   [id number]  :   1965  ACCT. NUMBER: [id number]  DATE OF SERVICE: 11/15/21  START TIME:  9:00 AM  END TIME:  9:50 AM  FACILITATOR: Mariangel Patiño [facility]  TOPIC:  Process Group    Diagnoses:  296.89 Bipolar II Disorder Depressed.  296.32 (F33.1) Major Depressive Disorder, Recurrent Episode, Moderate _  300.02 (F41.1) Generalized Anxiety Disorder.      [facility] 55+ Program  TRACK: A2  NUMBER OF PARTICIPANTS: 8          Data:    Session content: At the start of this group, patients were invited to check in by identifying themselves, describing their current emotional status, and identifying issues to address in this group.   Area(s) of treatment focus addressed in this session included Symptom Management, Personal Safety, Community Resources/Discharge Planning, Abstinence/Relapse Prevention, Develop / Improve Independent Living Skills and Develop Socialization / Interpersonal Relationship Skills.    Aren reports sad, depressed, irritable and angry mood with  preoccupation with thoughts. Denies S/I or safety issues. He processed the loss associated with the passing of his brother as well as the family dynamics. He has been estranged from his family the past 8 years. He and his wife are naming needs and plans to go to [place]. His brother will not be cremated till next week. He has been using coping skills for symptom management including self soothing through the senses.      Therapeutic Interventions/Treatment Strategies:  Psychotherapist offered support, feedback and validation and reinforced use of skills. Treatment modalities used include Cognitive Behavioral Therapy.    Assessment:    Patient response:   Patient responded to session by accepting feedback, listening, focusing on goals, being attentive and verbalizing understanding    Possible barriers to participation / learning include: and no barriers identified    Health Issues:   None reported-considering getting a COVID booster shot due to travel.        Substance Use Review:   Substance Use: No active concerns identified.    Mental Status/Behavioral Observations  Appearance:   Appropriate   Eye Contact:   Good   Psychomotor Behavior: Normal   Attitude:   Cooperative  Interested  Orientation:   All  Speech   Rate / Production: Normal    Volume:  Normal   Mood:    Angry  Irritable  Sad  Grieving  Affect:    Appropriate  Worrisome   Thought Content:   Rumination and Safety denies any current safety concerns including suicidal ideation, self-harm, and homicidal ideation  Thought Form:  Coherent  Goal Directed  Logical     Insight:    Good     Plan:     Safety Plan: No current safety concerns identified.  Recommended that patient call 911 or go to the local ED should there be a change in any of these risk factors.     Barriers to treatment: None identified    Patient Contracts (see media tab):  None    Substance Use: Not addressed in session     Continue or Discharge: Patient will continue in 55+ Program (55+) as  planned. Patient is likely to benefit from learning and using skills as they work toward the goals identified in their treatment plan. Aern will continue for mood stabilization and symptom management education for mental health recovery.      Mariangel Patiño, [facility]  November 15, 2021

## 2021-11-17 ENCOUNTER — HOSPITAL ENCOUNTER (OUTPATIENT)
Dept: BEHAVIORAL HEALTH | Facility: CLINIC | Age: 56
End: 2021-11-17
Attending: PSYCHIATRY & NEUROLOGY
Payer: COMMERCIAL

## 2021-11-17 PROCEDURE — 90853 GROUP PSYCHOTHERAPY: CPT | Mod: GT,95

## 2021-11-17 PROCEDURE — 90853 GROUP PSYCHOTHERAPY: CPT | Mod: GT,95 | Performed by: SOCIAL WORKER

## 2021-11-17 NOTE — GROUP NOTE
Psychotherapy Group Note    PATIENT'S NAME: Dash Arizmendi  MRN:   [id number]  :   1965  ACCT. NUMBER: [id number]  DATE OF SERVICE: 21  START TIME: 11:00 AM  END TIME: 11:50 AM  FACILITATOR: Mary Anne Olson LICSW  TOPIC: MH EBP Group: Behavioral Activation  [facility] 55+ Program  TRACK: A2                              Service Modality:  Video Visit     Telemedicine Visit: The patient's condition can be safely assessed and treated via synchronous audio and visual telemedicine encounter.      Reason for Telemedicine Visit: Services only offered telehealth    Originating Site (Patient Location): Patient's home    Distant Site (Provider Location): [facility] MENTAL HEALTH & ADDICTION SERVICES    Consent:  The patient/guardian has verbally consented to: the potential risks and benefits of telemedicine (video visit) versus in person care; bill my insurance or make self-payment for services provided; and responsibility for payment of non-covered services.     Patient would like the video invitation sent by:  My Chart    Mode of Communication:  Video Conference via Medical Zoom    As the provider I attest to compliance with applicable laws and regulations related to telemedicine.         NUMBER OF PARTICIPANTS: 9    Summary of Group / Topics Discussed:  Behavioral Activation: Motivation and Procrastination: Patients explored how they currently spend their time, identifying thoughts and feelings that are motivating and serve to increase desired behaviors.  They also examined behaviors that contribute to procrastination.  Different types of procrastination behaviors were identified, and strategies to reduce individual procrastination and increase motivation were explored and practiced.  Patients identified ways to increase goal-directed activities to enhance mood and reduce symptoms.        Patient Session Goals / Objectives:    Identify current patterns of procrastination behavior and how they  influence thoughts and moods, and inhibit motivation.    Identify behaviors that can be implemented that contribute to improving thoughts and feelings, motivation, and reduce symptoms.    Identify and develop a plan to increase activities that promote a sense of accomplishment and competence.    Practice scheduling positive activities / behaviors into daily routines.      Patient Participation / Response:  Fully participated with the group by sharing personal reflections / insights and openly received / provided feedback with other participants.    Demonstrated understanding of topics discussed through group discussion and participation and Expressed understanding of the relationship between behaviors, thoughts, and feelings    Treatment Plan:  Patient has a current master individualized treatment plan.  See Epic treatment plan for more information.    Mary Anne Kee, LICSW

## 2021-11-17 NOTE — GROUP NOTE
Psychotherapy Group Note    PATIENT'S NAME: Dash Arizmendi  MRN:   [id number]  :   1965  ACCT. NUMBER: [id number]  DATE OF SERVICE: 21  START TIME:  9:00 AM  END TIME:  9:50 AM  FACILITATOR: Mary Anne Olson LICSW  TOPIC: MH EBP Group: Relationship Skills  [facility] 55+ Program  TRACK: A2                              Service Modality:  Video Visit     Telemedicine Visit: The patient's condition can be safely assessed and treated via synchronous audio and visual telemedicine encounter.      Reason for Telemedicine Visit: Services only offered telehealth    Originating Site (Patient Location): Patient's home    Distant Site (Provider Location): [facility] MENTAL HEALTH & ADDICTION SERVICES    Consent:  The patient/guardian has verbally consented to: the potential risks and benefits of telemedicine (video visit) versus in person care; bill my insurance or make self-payment for services provided; and responsibility for payment of non-covered services.     Patient would like the video invitation sent by:  My Chart    Mode of Communication:  Video Conference via Medical Zoom    As the provider I attest to compliance with applicable laws and regulations related to telemedicine.         NUMBER OF PARTICIPANTS: 9    Summary of Group / Topics Discussed:  Relationship Skills: Conflict Resolution: Topic of conflict resolution in interpersonal communication was discussed. Patients received an overview of how communication skills during times of conflict impact symptoms and functioning. Patients discussed their current communication challenges and how this impacts their functioning. Patients also verbalized understanding of skills learned and practiced in session.     Patient Session Goals / Objectives:    Identified and discussed patient individual challenges with communication    Presented and practiced effective communication skills in session    Assisted patients in implementing more effective  communication skills in their relationships      Patient Participation / Response:  Fully participated with the group by sharing personal reflections / insights and openly received / provided feedback with other participants.    Demonstrated understanding of topics discussed through group discussion and participation and Demonstrated understanding of relationship skills and communication skills    Treatment Plan:  Patient has a current master individualized treatment plan.  See Epic treatment plan for more information.    Mary Anne Kee, LICSW

## 2021-11-17 NOTE — GROUP NOTE
Service Modality:  Video Visit     Telemedicine Visit: The patient's condition can be safely assessed and treated via synchronous audio and visual telemedicine encounter.       Reason for Telemedicine Visit: Services only offered telehealth and due to COVID-19.     Originating Site (Patient Location): Patient's home     Distant Site (Provider Location): Provider Remote Setting- Home Office     Consent:  The patient/guardian has verbally consented to: the potential risks and benefits of telemedicine (video visit) versus in person care; bill my insurance or make self-payment for services provided; and responsibility for payment of non-covered services.      Patient would like the video invitation sent by:  My Chart     Mode of Communication:  Video Conference via Medical Zoom     As the provider I attest to compliance with applicable laws and regulations related to telemedicine.    Process Group Note    PATIENT'S NAME: Dash Arizmendi  MRN:   [id number]  :   1965  ACCT. NUMBER: [id number]  DATE OF SERVICE: 21  START TIME: 10:00 AM  END TIME: 10:50 AM  FACILITATOR: Mariangel Patiño [facility]  TOPIC:  Process Group    Diagnoses:  296.89 Bipolar II Disorder Depressed.  296.32 (F33.1) Major Depressive Disorder, Recurrent Episode, Moderate _  300.02 (F41.1) Generalized Anxiety Disorder.      [facility] 55+ Program  TRACK: A2    NUMBER OF PARTICIPANTS: 9          Data:    Session content: At the start of this group, patients were invited to check in by identifying themselves, describing their current emotional status, and identifying issues to address in this group.   Area(s) of treatment focus addressed in this session included Symptom Management, Personal Safety, Community Resources/Discharge Planning, Abstinence/Relapse Prevention, Develop / Improve Independent Living Skills and Develop Socialization / Interpersonal Relationship Skills.    Aren reports sad and anxious mood with racing thoughts.  Denies S/I or safety issues. He reports interrupted sleep and has been up since 4:00 am. He continues to process bereavement. He is awaiting to hear back from his niece about  plans so he can make his plans to travel to [place]. He did get his COVID booster. Aren reports medication compliance. He is finding the Ativan helpful. He continues to consult with his individual therapist and is using coping skills for symptom management.      Therapeutic Interventions/Treatment Strategies:  Psychotherapist offered support, feedback and validation and reinforced use of skills. Treatment modalities used include Cognitive Behavioral Therapy.    Assessment:    Patient response:   Patient responded to session by verbalizing understanding    Possible barriers to participation / learning include: and no barriers identified    Health Issues:   Yes: Sleep disturbance, Associated Psychological Distress       Substance Use Review:   Substance Use: No active concerns identified.    Mental Status/Behavioral Observations  Appearance:   Appropriate   Eye Contact:   Good   Psychomotor Behavior: Normal   Attitude:   Cooperative  Interested  Orientation:   All  Speech   Rate / Production: Normal    Volume:  Normal   Mood:    Anxious  Sad   Affect:    Appropriate  Worrisome   Thought Content:   Rumination and Safety denies any current safety concerns including suicidal ideation, self-harm, and homicidal ideation  Thought Form:  Coherent  Goal Directed  Logical     Insight:    Good     Plan:     Safety Plan: No current safety concerns identified.  Recommended that patient call 911 or go to the local ED should there be a change in any of these risk factors.     Barriers to treatment: None identified    Patient Contracts (see media tab):  None    Substance Use: Not addressed in session     Continue or Discharge: Patient will continue in 55+ Program (55+) as planned. Patient is likely to benefit from learning and using skills as they work  toward the goals identified in their treatment plan. Aren will continue for mood stabilization and symptom management education for mental health recovery.      Mariangel Patiño, [facility]  November 17, 2021

## 2021-11-18 ENCOUNTER — HOSPITAL ENCOUNTER (OUTPATIENT)
Dept: BEHAVIORAL HEALTH | Facility: CLINIC | Age: 56
End: 2021-11-18
Attending: PSYCHIATRY & NEUROLOGY
Payer: COMMERCIAL

## 2021-11-18 PROCEDURE — 90853 GROUP PSYCHOTHERAPY: CPT | Mod: GT,95 | Performed by: SOCIAL WORKER

## 2021-11-18 PROCEDURE — 90853 GROUP PSYCHOTHERAPY: CPT | Mod: GT,95

## 2021-11-18 NOTE — GROUP NOTE
Service Modality:  Video Visit     Telemedicine Visit: The patient's condition can be safely assessed and treated via synchronous audio and visual telemedicine encounter.       Reason for Telemedicine Visit: Services only offered telehealth and due to COVID-19.     Originating Site (Patient Location): Patient's home     Distant Site (Provider Location): Provider Remote Setting- Home Office     Consent:  The patient/guardian has verbally consented to: the potential risks and benefits of telemedicine (video visit) versus in person care; bill my insurance or make self-payment for services provided; and responsibility for payment of non-covered services.      Patient would like the video invitation sent by:  My Chart     Mode of Communication:  Video Conference via Medical Zoom     As the provider I attest to compliance with applicable laws and regulations related to telemedicine.    Process Group Note    PATIENT'S NAME: Dash Arizmendi  MRN:   [id number]  :   1965  ACCT. NUMBER: [id number]  DATE OF SERVICE: 21  START TIME:  9:00 AM  END TIME:  9:50 AM  FACILITATOR: Mariangel Patiño [facility]  TOPIC:  Process Group    Diagnoses:  296.89 Bipolar II Disorder Depressed.  296.32 (F33.1) Major Depressive Disorder, Recurrent Episode, Moderate _  300.02 (F41.1) Generalized Anxiety Disorder.      [facility] 55+ Program  TRACK: A2    NUMBER OF PARTICIPANTS: 7          Data:    Session content: At the start of this group, patients were invited to check in by identifying themselves, describing their current emotional status, and identifying issues to address in this group.   Area(s) of treatment focus addressed in this session included Symptom Management, Personal Safety, Community Resources/Discharge Planning, Abstinence/Relapse Prevention, Develop / Improve Independent Living Skills, Develop Socialization / Interpersonal Relationship Skills and Physical Health .    Aren continues to cope with bereavement.  He did hear back from his niece about  arrangements which will be most likely the first week in December. He is concerned about traveling with COVID. He is considering  his needs and ways to be supportive. He has childhood pictures he will sort through for his niece. He was encouraged to give himself a time limit. He is dealing with the side effects related to the COVID booster. He consulted with his outpatient therapist. He is using coping skills for symptom management.         Therapeutic Interventions/Treatment Strategies:  Psychotherapist offered support, feedback and validation and reinforced use of skills. Treatment modalities used include Cognitive Behavioral Therapy.    Assessment:    Patient response:   Patient responded to session by verbalizing understanding    Possible barriers to participation / learning include: and no barriers identified    Health Issues:   Yes: Side effects related to COVID booster       Substance Use Review:   Substance Use: No active concerns identified.    Mental Status/Behavioral Observations  Appearance:   Appropriate   Eye Contact:   Good   Psychomotor Behavior: Normal   Attitude:   Cooperative  Interested  Orientation:   All  Speech   Rate / Production: Normal    Volume:  Normal   Mood:    Anxious  Sad  Grieving  Affect:    Appropriate  Worrisome   Thought Content:   Rumination and Safety denies any current safety concerns including suicidal ideation, self-harm, and homicidal ideation  Thought Form:  Coherent  Goal Directed  Logical     Insight:    Good     Plan:     Safety Plan: No current safety concerns identified.  Recommended that patient call 911 or go to the local ED should there be a change in any of these risk factors.     Barriers to treatment: None identified    Patient Contracts (see media tab):  None    Substance Use: Not addressed in session     Continue or Discharge: Patient will continue in 55+ Program (55+) as planned. Patient is likely to benefit from  learning and using skills as they work toward the goals identified in their treatment plan. Aren will continue for mood stabilization and symptom management education for mental health recovery.        Mariangel Patiño, [facility]  November 18, 2021

## 2021-11-18 NOTE — GROUP NOTE
Psychotherapy Group Note    PATIENT'S NAME: Dash Arizmendi  MRN:   [id number]  :   1965  ACCT. NUMBER: [id number]  DATE OF SERVICE: 21  START TIME: 10:00 AM  END TIME: 10:50 AM  FACILITATOR: Mary Anne Olson LICSW  TOPIC: MH EBP Group: Self-Awareness  [facility] 55+ Program  TRACK: A2                              Service Modality:  Video Visit     Telemedicine Visit: The patient's condition can be safely assessed and treated via synchronous audio and visual telemedicine encounter.      Reason for Telemedicine Visit: Services only offered telehealth    Originating Site (Patient Location): Patient's home    Distant Site (Provider Location): [facility] MENTAL HEALTH & ADDICTION SERVICES    Consent:  The patient/guardian has verbally consented to: the potential risks and benefits of telemedicine (video visit) versus in person care; bill my insurance or make self-payment for services provided; and responsibility for payment of non-covered services.     Patient would like the video invitation sent by:  My Chart    Mode of Communication:  Video Conference via Medical Zoom    As the provider I attest to compliance with applicable laws and regulations related to telemedicine.         NUMBER OF PARTICIPANTS: 8    Summary of Group / Topics Discussed:  Self-Awareness: Gratitude/GLADLYGO: Topic focused on assisting patients in identifying alejo concepts in gratitude and acknowledging progress. Patients discussed the benefits of practicing gratitude and its impact on mood improvement, mindfulness, and perspective. Patients worked to increase time spent on recognition and appreciation of what is positive and working in their lives. Patients discussed the concepts and benefits of feeling grateful. The goal is to reduce rumination and negative thinking resulting in increased mindfulness and resilience. Patients specifically discussed how they can practice and problem solve barriers to daily gratitude  practice.     Patient Session Goals / Objectives:    [place] the concept and benefits of gratitude    Identified ways to practice gratitude in daily life    Problem solved barriers to practicing gratitude      Patient Participation / Response:  Fully participated with the group by sharing personal reflections / insights and openly received / provided feedback with other participants.    Demonstrated understanding of topics discussed through group discussion and participation and Practiced skills in session    Treatment Plan:  Patient has a current master individualized treatment plan.  See Epic treatment plan for more information.    MARCOS LorenzoSW

## 2021-11-18 NOTE — GROUP NOTE
Service Modality:  Video Visit     Telemedicine Visit: The patient's condition can be safely assessed and treated via synchronous audio and visual telemedicine encounter.       Reason for Telemedicine Visit: Services only offered telehealth and due to COVID-19.     Originating Site (Patient Location): Patient's home     Distant Site (Provider Location): Provider Remote Setting- Home Office     Consent:  The patient/guardian has verbally consented to: the potential risks and benefits of telemedicine (video visit) versus in person care; bill my insurance or make self-payment for services provided; and responsibility for payment of non-covered services.      Patient would like the video invitation sent by:  My Chart     Mode of Communication:  Video Conference via Medical Zoom     As the provider I attest to compliance with applicable laws and regulations related to telemedicine.    Psychotherapy Group Note    PATIENT'S NAME: Dash Arizmendi  MRN:   [id number]  :   1965  ACCT. NUMBER: [id number]  DATE OF SERVICE: 21  START TIME: 11:00 AM  END TIME: 11:50 AM  FACILITATOR: Mariangel Patiño [facility]  TOPIC:  EBP Group: Coping Skills  [facility] 55+ Program  TRACK: A2    NUMBER OF PARTICIPANTS: 7    Summary of Group / Topics Discussed:  Coping Skills: Self-Soothe: Patients learned to apply self-soothe as a way to decrease heightened stress in the moment.  Patients identified situations that necessitate self-soothe strategies.  They focused on ways to manage physical symptoms of distress using the senses. They discussed how to distinguish when this can be useful in their lives when other strategies are more relevant or helpful.    Patient Session Goals / Objectives:    Understand the purpose of using the senses to decrease distress    Process what happens in the body when using self-soothe strategies    Demonstrate understanding of when to use self-soothe strategies    Identify and problem solve  barriers to applying self-soothe strategies.    Choose 1-2 self-soothe strategies to apply during times of distress.        Patient Participation / Response:  Fully participated with the group by sharing personal reflections / insights and openly received / provided feedback with other participants.    Demonstrated understanding of topics discussed through group discussion and participation    Treatment Plan:  Patient has a current master individualized treatment plan.  See Epic treatment plan for more information.    Mariangel Patiño, AMRCOSSW

## 2021-11-22 ENCOUNTER — HOSPITAL ENCOUNTER (OUTPATIENT)
Dept: BEHAVIORAL HEALTH | Facility: CLINIC | Age: 56
End: 2021-11-22
Attending: PSYCHIATRY & NEUROLOGY
Payer: COMMERCIAL

## 2021-11-22 PROCEDURE — 90853 GROUP PSYCHOTHERAPY: CPT | Mod: GT,95

## 2021-11-22 NOTE — GROUP NOTE
Psychotherapy Group Note    PATIENT'S NAME: Dash Arizmendi  MRN:   [id number]  :   1965  ACCT. NUMBER: [id number]  DATE OF SERVICE: 21  START TIME: 11:00 AM  END TIME: 11:50 AM  FACILITATOR: Lana Botello LGSW  TOPIC: MH EBP Group: Behavioral Activation  [facility] 55+ Program  TRACK: A2    NUMBER OF PARTICIPANTS:     Summary of Group / Topics Discussed:  Behavioral Activation: Activity Scheduling:Patients explored how they currently spend their time, and how specific behaviors impact thoughts and feelings.  The group explored the effect of negative and positive activities on mood states and thought patterns.  Patients identified activities that help to improve mood and thinking patterns, and developed a plan to implement positive activities between sessions.      Patient Session Goals / Objectives:    Identify impact of current behaviors on thoughts and mood    Identify 2-3 behavioral changes that could have a positive impact on thoughts and mood    Prepare to make desired behavioral change: Create a change plan / activity schedule                                    Service Modality:  Video Visit     Telemedicine Visit: The patient's condition can be safely assessed and treated via synchronous audio and visual telemedicine encounter.      Reason for Telemedicine Visit: Services only offered telehealth    Originating Site (Patient Location): Patient's home    Distant Site (Provider Location): Provider Remote Setting- Home Office    Consent:  The patient/guardian has verbally consented to: the potential risks and benefits of telemedicine (video visit) versus in person care; bill my insurance or make self-payment for services provided; and responsibility for payment of non-covered services.     Patient would like the video invitation sent by:  My Chart    Mode of Communication:  Video Conference via Medical Zoom    As the provider I attest to compliance with applicable laws and regulations related  to telemedicine.            Patient Participation / Response:  Fully participated with the group by sharing personal reflections / insights and openly received / provided feedback with other participants.    Demonstrated understanding of topics discussed through group discussion and participation, Expressed understanding of the relationship between behaviors, thoughts, and feelings and Shared experiences and challenges with making behavioral changes    Treatment Plan:  Patient has a current master individualized treatment plan.  See Epic treatment plan for more information.    Lana Botello LGSW

## 2021-11-22 NOTE — GROUP NOTE
"Psychotherapy Group Note    PATIENT'S NAME: Dash Arizmendi  MRN:   [id number]  :   1965  ACCT. NUMBER: [id number]  DATE OF SERVICE: 21  START TIME: 10:00 AM  END TIME: 10:50 AM  FACILITATOR: Mary Anne Olson LICSW  TOPIC: MH EBP Group: Coping Skills  [facility] 55+ Program  TRACK: A2                              Service Modality:  Video Visit     Telemedicine Visit: The patient's condition can be safely assessed and treated via synchronous audio and visual telemedicine encounter.      Reason for Telemedicine Visit: Services only offered telehealth    Originating Site (Patient Location): Patient's home    Distant Site (Provider Location): [facility] MENTAL HEALTH & ADDICTION SERVICES    Consent:  The patient/guardian has verbally consented to: the potential risks and benefits of telemedicine (video visit) versus in person care; bill my insurance or make self-payment for services provided; and responsibility for payment of non-covered services.     Patient would like the video invitation sent by:  My Chart    Mode of Communication:  Video Conference via Medical Zoom    As the provider I attest to compliance with applicable laws and regulations related to telemedicine.         NUMBER OF PARTICIPANTS: 8    Summary of Group / Topics Discussed:  Coping Skills: Additional Coping Skills:  Patients discussed and practiced \"Do Positive Experiences Stick to Your Ribs\" handout.  Reviewed the benefits of applying the aforementioned coping strategies.  Patients explored how these strategies might be applied to daily stressors or distressing situations.    Patient Session Goals / Objectives:    Understand the purpose and benefits of applying these coping strategies    Practiced noticing positive experiences    Address barriers to utilizing coping skills when in distress.        Patient Participation / Response:  Fully participated with the group by sharing personal reflections / insights and openly " received / provided feedback with other participants.    Demonstrated understanding of topics discussed through group discussion and participation, Expressed understanding of the relevance / importance of coping skills at distressing times in life and Identified / Expressed personal readiness to practice new coping skills    Treatment Plan:  Patient has a current master individualized treatment plan.  See Epic treatment plan for more information.    Mary Anne Kee, LICSW

## 2021-11-22 NOTE — GROUP NOTE
"Process Group Note    PATIENT'S NAME: Dash Arizmendi  MRN:   [id number]  :   1965  ACCT. NUMBER: [id number]  DATE OF SERVICE: 21  START TIME:  9:00 AM  END TIME:  9:50 AM  FACILITATOR: Lana Botello LGSW  TOPIC:  Process Group    Diagnoses:  296.89 Bipolar II Disorder Depressed.  296.32 (F33.1) Major Depressive Disorder, Recurrent Episode, Moderate _  300.02 (F41.1) Generalized Anxiety Disorder.       [facility] [place] 55+ Program  TRACK: A2    NUMBER OF PARTICIPANTS:                                       Service Modality:  Video Visit     Telemedicine Visit: The patient's condition can be safely assessed and treated via synchronous audio and visual telemedicine encounter.      Reason for Telemedicine Visit: Services only offered telehealth    Originating Site (Patient Location): Patient's home    Distant Site (Provider Location): Provider Remote Setting- Home Office    Consent:  The patient/guardian has verbally consented to: the potential risks and benefits of telemedicine (video visit) versus in person care; bill my insurance or make self-payment for services provided; and responsibility for payment of non-covered services.     Patient would like the video invitation sent by:  My Chart    Mode of Communication:  Video Conference via Medical Zoom    As the provider I attest to compliance with applicable laws and regulations related to telemedicine.                Data:    Session content: At the start of this group, patients were invited to check in by identifying themselves, describing their current emotional status, and identifying issues to address in this group.   Area(s) of treatment focus addressed in this session included Symptom Management, Personal Safety and Community Resources/Discharge Planning.  Client reported having a \"rough weekend\".  Client reported being \"weepy\" and having increased anxiety symptoms.  Client was unsure what caused his worsened mood but guessed that it was due to " "events related to his brother's recent passing.  Client explained that he has been unable to connect with his niece, who is handling  arrangements.  Client reported a plan to be persistent with outreach until his niece responds.  Client explained that feelings of helplessness related to this situation caused him to cut himself over the weekend as an outlet.  Writer talked to client about ways to resist urges to self harm.  Client reported talking with his therapist about ways to resist urges, including wearing a rubber band, putting an ice cube on his wrist and practicing mindfulness.  Client explained that these techniques \"only delayed\" his use of self-harm.  Writer encouraged client to continue exploring different coping strategies and commended his efforts to resist self harm urges.  Client reported Dr. Tabor recently prescribing him a medication that would \"help with self-harm\".  Client denied current   SI or SIB.     Therapeutic Interventions/Treatment Strategies:  Psychotherapist offered support, feedback and validation and reinforced use of skills. Treatment modalities used include Cognitive Behavioral Therapy. Interventions include Coping Skills: Discussed how the use of intentional \"in the moment\" actions can help reduce distress and Reviewed patients current calming practices and discussed a more formal way of practicing and accessing skills.    Assessment:    Patient response:   Patient responded to session by accepting feedback and listening    Possible barriers to participation / learning include: and no barriers identified    Health Issues:   None reported       Substance Use Review:   Substance Use: No active concerns identified.    Mental Status/Behavioral Observations  Appearance:   Appropriate   Eye Contact:   Good   Psychomotor Behavior: Normal   Attitude:   Cooperative  Interested Pleasant  Orientation:   All  Speech   Rate / Production: Normal    Volume:  Normal   Mood:    Depressed "   Affect:    Appropriate   Thought Content:   Clear and Safety reports  thoughts of self-harm  Thought Form:  Coherent  Logical     Insight:    Good     Plan:     Safety Plan: No current safety concerns identified.  Recommended that patient call 911 or go to the local ED should there be a change in any of these risk factors.     Barriers to treatment: None identified    Patient Contracts (see media tab):  None    Substance Use: Not addressed in session     Continue or Discharge: Patient will continue in 55+ Program (55+) as planned. Patient is likely to benefit from learning and using skills as they work toward the goals identified in their treatment plan.      ERMELINDA Spencer  November 22, 2021

## 2021-11-24 ENCOUNTER — HOSPITAL ENCOUNTER (OUTPATIENT)
Dept: BEHAVIORAL HEALTH | Facility: CLINIC | Age: 56
End: 2021-11-24
Attending: PSYCHIATRY & NEUROLOGY
Payer: COMMERCIAL

## 2021-11-24 PROCEDURE — 90853 GROUP PSYCHOTHERAPY: CPT | Mod: GT,95

## 2021-11-24 NOTE — GROUP NOTE
Psychoeducation Group Note    PATIENT'S NAME: Dash Arizmendi  MRN:   [id number]  :   1965  ACCT. NUMBER: [id number]  DATE OF SERVICE: 21  START TIME: 11:00 AM  END TIME: 11:50 AM  FACILITATOR: Mary Anne Olson LICSW  TOPIC: CHUCKY RN Group: Health Maintenance  [facility] 55+ Program  TRACK: 7                              Service Modality:  Video Visit     Telemedicine Visit: The patient's condition can be safely assessed and treated via synchronous audio and visual telemedicine encounter.      Reason for Telemedicine Visit: Services only offered telehealth    Originating Site (Patient Location): Patient's home    Distant Site (Provider Location): [facility] MENTAL HE[facility] & ADDICTION SERVICES    Consent:  The patient/guardian has verbally consented to: the potential risks and benefits of telemedicine (video visit) versus in person care; bill my insurance or make self-payment for services provided; and responsibility for payment of non-covered services.     Patient would like the video invitation sent by:  My Chart    Mode of Communication:  Video Conference via Medical Zoom    As the provider I attest to compliance with applicable laws and regulations related to telemedicine.         NUMBER OF PARTICIPANTS: 7    Summary of Group / Topics Discussed:  Health Maintenance: Weekend planning: Patients were given time to complete a weekend plan of what they will do to promote wellness and sobriety over the weekend when they do not have the structure of group. Patients were encouraged to review progress on their treatment goals and were challenged to identify ways to work toward meeting them. Patients identified and discussed foreseeable barriers to success over the weekend and then developed a plan to overcome them. Patients reviewed their distress coping skills and social support network and discussed this with the group.       Patient Session Goals / Objectives:    ?    Identified activities to  engage in that promote balance in wellness  ?    Distinguished possible barriers to success over the weekend and created a plan to overcome them  ?    Listed distress coping skills and identified social support network to utilize if in crisis during the weekend        Patient Participation / Response:  Fully participated with the group by sharing personal reflections / insights and openly received / provided feedback with other participants.    Demonstrated understanding of topics discussed through group discussion and participation and Identified / Expressed personal readiness to practice skills    Treatment Plan:  Patient has a current master individualized treatment plan.  See Epic treatment plan for more information.    Mary Anne Kee, LICSW

## 2021-11-24 NOTE — GROUP NOTE
Psychotherapy Group Note    PATIENT'S NAME: Dash Arizmendi  MRN:   [id number]  :   1965  ACCT. NUMBER: [id number]  DATE OF SERVICE: 21  START TIME: 10:00 AM  END TIME: 10:50 AM  FACILITATOR: Lana Botello LGSW  TOPIC: MH EBP Group: Relationship Skills  [facility] 55+ Program  TRACK: A2    NUMBER OF PARTICIPANTS: 7    Summary of Group / Topics Discussed:  Relationship Skills: Dependencies: Patients were provided with a general overview of different types of dependencies and how they relate to symptoms and functioning. The purpose is to help patients identify the different types of dependencies, how they may be impacted by them, and to gain awareness and skills to work towards healthier relationships.    Patient Session Goals / Objectives:    Familiarized patients with the concept of interpersonal relationships and their characteristics.      Discussed and practiced strategies to promote healthier understanding of interpersonal relationships with a focus on dependencies    Identified types of dependencies in patient s lives and how they impact their symptoms and functioning                                      Service Modality:  Video Visit     Telemedicine Visit: The patient's condition can be safely assessed and treated via synchronous audio and visual telemedicine encounter.      Reason for Telemedicine Visit: Services only offered telehealth    Originating Site (Patient Location): Patient's home    Distant Site (Provider Location): Provider Remote Setting- Home Office    Consent:  The patient/guardian has verbally consented to: the potential risks and benefits of telemedicine (video visit) versus in person care; bill my insurance or make self-payment for services provided; and responsibility for payment of non-covered services.     Patient would like the video invitation sent by:  My Chart    Mode of Communication:  Video Conference via Medical Zoom    As the provider I attest to compliance  with applicable laws and regulations related to telemedicine.            Patient Participation / Response:  Moderately participated, sharing some personal reflections / insights and adequately adequately received / provided feedback with other participants.    Demonstrated understanding of topics discussed through group discussion and participation and Demonstrated understanding of relationship skills and communication skills    Treatment Plan:  Patient has a current master individualized treatment plan.  See Epic treatment plan for more information.    Lana Botello LGSW

## 2021-11-24 NOTE — GROUP NOTE
"Process Group Note    PATIENT'S NAME: Dash Arizmendi  MRN:   [id number]  :   1965  ACCT. NUMBER: [id number]  DATE OF SERVICE: 21  START TIME:  9:00 AM  END TIME:  9:50 AM  FACILITATOR: Lana Botello LGSW  TOPIC:  Process Group    Diagnoses:  296.89 Bipolar II Disorder Depressed.  296.32 (F33.1) Major Depressive Disorder, Recurrent Episode, Moderate _  300.02 (F41.1) Generalized Anxiety Disorder.       [facility] 55+ Program  TRACK: A2    NUMBER OF PARTICIPANTS: 7                                      Service Modality:  Video Visit     Telemedicine Visit: The patient's condition can be safely assessed and treated via synchronous audio and visual telemedicine encounter.      Reason for Telemedicine Visit: Services only offered telehealth    Originating Site (Patient Location): Patient's home    Distant Site (Provider Location): Provider Remote Setting- Home Office    Consent:  The patient/guardian has verbally consented to: the potential risks and benefits of telemedicine (video visit) versus in person care; bill my insurance or make self-payment for services provided; and responsibility for payment of non-covered services.     Patient would like the video invitation sent by:  My Chart    Mode of Communication:  Video Conference via Medical Zoom    As the provider I attest to compliance with applicable laws and regulations related to telemedicine.                Data:    Session content: At the start of this group, patients were invited to check in by identifying themselves, describing their current emotional status, and identifying issues to address in this group.   Area(s) of treatment focus addressed in this session included Symptom Management, Personal Safety and Community Resources/Discharge Planning.  Client reported having an \"exhausting day emotionally\" yesterday with an individual therapist appointment, a depression/anxiety men's group and going through family pictures in preparation for " "his brother's .  Client reports the pictures triggering remembering repressed memories of his family.  \"I chose not to remember a lot of stuff\".    Client reported feeling as though his brother's death was being \"dragged on\" by his family as it has been 2.5 weeks of  planning.  Client reported no communication from his niece, despite his continued outreach attempts.  Client cites his wife as a support with making family calls related to planning his brother's .  Writer asked client what he was doing to take care of himself to recharge.  Client reported wanting to make a couple calls today and intentionally taking Thursday off.  \"I will probably think about family stuff but I am not doing anything about it\".  Client denied current SI and SIB.      Therapeutic Interventions/Treatment Strategies:  Psychotherapist offered support, feedback and validation and reinforced use of skills. Treatment modalities used include Cognitive Behavioral Therapy. Interventions include Coping Skills: Discussed use of self-soothe skills to decrease distress in the body and Relationship Skills: Encouraged development and maintenance  of healthy boundaries.    Assessment:    Patient response:   Patient responded to session by accepting feedback and listening    Possible barriers to participation / learning include: and no barriers identified    Health Issues:   None reported       Substance Use Review:   Substance Use: No active concerns identified.    Mental Status/Behavioral Observations  Appearance:   Appropriate   Eye Contact:   Good   Psychomotor Behavior: Normal   Attitude:   Cooperative  Interested Pleasant  Orientation:   All  Speech   Rate / Production: Normal    Volume:  Normal   Mood:    Depressed   Affect:    Appropriate   Thought Content:   Clear and Safety denies any current safety concerns including suicidal ideation, self-harm, and homicidal ideation  Thought Form:  Coherent  Logical     Insight:    Good "     Plan:     Safety Plan: No current safety concerns identified.  Recommended that patient call 911 or go to the local ED should there be a change in any of these risk factors.     Barriers to treatment: None identified    Patient Contracts (see media tab):  None    Substance Use: Not addressed in session     Continue or Discharge: Patient will continue in 55+ Program (55+) as planned. Patient is likely to benefit from learning and using skills as they work toward the goals identified in their treatment plan.      ERMELINDA Spencer  November 24, 2021

## 2021-11-29 ENCOUNTER — HOSPITAL ENCOUNTER (OUTPATIENT)
Dept: BEHAVIORAL HEALTH | Facility: CLINIC | Age: 56
End: 2021-11-29
Attending: PSYCHIATRY & NEUROLOGY
Payer: COMMERCIAL

## 2021-11-29 ENCOUNTER — TELEPHONE (OUTPATIENT)
Dept: BEHAVIORAL HEALTH | Facility: CLINIC | Age: 56
End: 2021-11-29
Payer: COMMERCIAL

## 2021-11-29 PROCEDURE — 90853 GROUP PSYCHOTHERAPY: CPT | Mod: GT,95

## 2021-11-29 NOTE — GROUP NOTE
Psychotherapy Group Note    PATIENT'S NAME: Dash Arizmendi  MRN:   [id number]  :   1965  ACCT. NUMBER: [id number]  DATE OF SERVICE: 21  START TIME: 10:00 AM  END TIME: 10:50 AM  FACILITATOR: Mary Anne Olson LICSW  TOPIC: MH EBP Group: Relationship Skills  [facility] 55+ Program  TRACK: A2                              Service Modality:  Video Visit     Telemedicine Visit: The patient's condition can be safely assessed and treated via synchronous audio and visual telemedicine encounter.      Reason for Telemedicine Visit: Services only offered telehealth    Originating Site (Patient Location): Patient's home    Distant Site (Provider Location): [facility] MENTAL HEALTH & ADDI[facility]    Consent:  The patient/guardian has verbally consented to: the potential risks and benefits of telemedicine (video visit) versus in person care; bill my insurance or make self-payment for services provided; and responsibility for payment of non-covered services.     Patient would like the video invitation sent by:  My Chart    Mode of Communication:  Video Conference via Medical Zoom    As the provider I attest to compliance with applicable laws and regulations related to telemedicine.         NUMBER OF PARTICIPANTS: 7    Summary of Group / Topics Discussed:  Relationship Skills: Assertive Communication: Patients were provided with a general overview of assertive communication skills and how practicing assertive communication skills will assist patients in developing healthier and more effective relationships. Patients reviewed their current awareness on ability to practice assertive communication, ways to increase assertive communication, and identified/problem solved barriers to assertive communication.     Patient Session Goals / Objectives:    Identified and discussed patient individual challenges with communication    Presented and practiced effective communication skills in session    Assisted  patients in implementing more effective communication skills in their relationships      Patient Participation / Response:  Fully participated with the group by sharing personal reflections / insights and openly received / provided feedback with other participants.    Demonstrated understanding of topics discussed through group discussion and participation and Demonstrated understanding of relationship skills and communication skills    Treatment Plan:  Patient has a current master individualized treatment plan.  See Epic treatment plan for more information.    Mary Anne Kee, LICSW

## 2021-11-29 NOTE — GROUP NOTE
Psychotherapy Group Note    PATIENT'S NAME: Dash Arizmendi  MRN:   [id number]  :   1965  ACCT. NUMBER: [id number]  DATE OF SERVICE: 21  START TIME: 11:00 AM  END TIME: 11:50 AM  FACILITATOR: Lana Botello LGSW  TOPIC:  EBP Group: Behavioral Activation  [facility] 55+ Program  TRACK: A2    NUMBER OF PARTICIPANTS:     Summary of Group / Topics Discussed:  Behavioral Activation: Activity Scheduling:Patients explored how they currently spend their time, and how specific behaviors impact thoughts and feelings.  The group explored the effect of negative and positive activities on mood states and thought patterns.  Patients identified activities that help to improve mood and thinking patterns, and developed a plan to implement positive activities between sessions.      Patient Session Goals / Objectives:    Identify impact of current behaviors on thoughts and mood    Identify 2-3 behavioral changes that could have a positive impact on thoughts and mood    Prepare to make desired behavioral change: Create a change plan / activity schedule      Patient Participation / Response:  Fully participated with the group by sharing personal reflections / insights and openly received / provided feedback with other participants.    Demonstrated understanding of topics discussed through group discussion and participation and Expressed understanding of the relationship between behaviors, thoughts, and feelings    Treatment Plan:  Patient has a current master individualized treatment plan.  See Epic treatment plan for more information.    ERMELINDA Spencer

## 2021-11-29 NOTE — GROUP NOTE
"Process Group Note    PATIENT'S NAME: Dash Arizmendi  MRN:   [id number]  :   1965  ACCT. NUMBER: [id number]  DATE OF SERVICE: 21  START TIME:  9:00 AM  END TIME:  9:50 AM  FACILITATOR: Lana Botello LGSW  TOPIC:  Process Group    Diagnoses:  296.89 Bipolar II Disorder Depressed.  296.32 (F33.1) Major Depressive Disorder, Recurrent Episode, Moderate _  300.02 (F41.1) Generalized Anxiety Disorder.       [facility] 55+ Program  TRACK: A2    NUMBER OF PARTICIPANTS: 7                                      Service Modality:  Video Visit     Telemedicine Visit: The patient's condition can be safely assessed and treated via synchronous audio and visual telemedicine encounter.      Reason for Telemedicine Visit: Services only offered telehealth    Originating Site (Patient Location): Patient's home    Distant Site (Provider Location): Provider Remote Setting- Home Office    Consent:  The patient/guardian has verbally consented to: the potential risks and benefits of telemedicine (video visit) versus in person care; bill my insurance or make self-payment for services provided; and responsibility for payment of non-covered services.     Patient would like the video invitation sent by:  My Chart    Mode of Communication:  Video Conference via Medical Zoom    As the provider I attest to compliance with applicable laws and regulations related to telemedicine.                Data:    Session content: At the start of this group, patients were invited to check in by identifying themselves, describing their current emotional status, and identifying issues to address in this group.   Area(s) of treatment focus addressed in this session included Symptom Management, Personal Safety and Community Resources/Discharge Planning.  Client reported having an \"uneventful\" thanksgiving with him, his wife and adult child.   Client reported getting ahold of his niece via text message.  Client reported coordinating with his " "friend from his hometown to  and ship some items from his brother's house to client's house.  Client reported this causing increased rumination and negative self talk. \"I was thinking maybe I am to blame or I am bad\".  Peers offered supportive feedback and strategies on overcoming negative self talk.  Client reported coping with his increased stress by sleeping from Friday to Sunday.  \"I was avoiding myself\". Client reported a \"kerfuffle\" with his antidepressant prescription being approved by his doctor but on back order until December 1st.  Client reported fear of side effects and the potential he will decide to stop taking the medication all together as risks. Client did not report any suicidal ideation, plan or intent.  Writer offered to communicate with Dr. Casey to explore ways to avoid missing a dose.  Writer sent the following to the RN triage teams chat:    [12:04 PM] Lana Botello      RE: Aren [place] in A2   Naveedanamika said his [facility]ending [facility] to [facility] provider  recently approved a refill for his 40mg Fetzima but that his pharmacy ([facility]) did not have it in stock for another two days.  Aren took his last dose today.  Aren is concerned with missing a dose due to side effects he has experienced before.  However, Aren said his biggest concern with the delay is that he might feel inclined to stop taking the medicine.  Is there anything Dr. Casey or our team can do to make sure he does not miss a dose?  Aren does not currently get his meds managed by Dr. Casey.    Writer will follow up with client to problem solve.        Therapeutic Interventions/Treatment Strategies:  Psychotherapist offered support, feedback and validation and reinforced use of skills. Treatment modalities used include Cognitive Behavioral Therapy. Interventions include Coping Skills: Assisted patient in identifying 1-2 healthy distraction skills to reduce overall distress and Relapse Prevention: " Discussed strategies to stay medication compliant..    Assessment:    Patient response:   Patient responded to session by accepting feedback and listening    Possible barriers to participation / learning include: and no barriers identified    Health Issues:   None reported       Substance Use Review:   Substance Use: No active concerns identified.    Mental Status/Behavioral Observations  Appearance:   Appropriate   Eye Contact:   Good   Psychomotor Behavior: Normal   Attitude:   Cooperative  Interested Pleasant  Orientation:   All  Speech   Rate / Production: Normal    Volume:  Normal   Mood:    Depressed   Affect:    Appropriate   Thought Content:   Clear and Safety denies any current safety concerns including suicidal ideation, self-harm, and homicidal ideation  Thought Form:  Coherent  Logical     Insight:    Good     Plan:     Safety Plan: No current safety concerns identified.  Recommended that patient call 911 or go to the local ED should there be a change in any of these risk factors.     Barriers to treatment: None identified    Patient Contracts (see media tab):  None    Substance Use: Not addressed in session     Continue or Discharge: Patient will continue in 55+ Program (55+) as planned. Patient is likely to benefit from learning and using skills as they work toward the goals identified in their treatment plan.      ERMELINDA Spencer  November 29, 2021

## 2021-11-29 NOTE — TELEPHONE ENCOUNTER
Writer called patient to follow up about his medication concern after bringing it up in group therapy this morning.  See below for a summary of this medication concern (sent to the RN Triage Team):     Aren said his [facility] provider recently approved a refill for his 40mg Fetzima but that his pharmacy ([facility] [address] in [place]) did not have it in  stock for another two days.  Aren took his last dose today.  Aren is concerned with missing a dose due to side effects he has experienced before.  However, Aren said his biggest  concern with the delay is that he might feel inclined to stop taking the medicine.  Is there anything Dr. Casey or our team can do to make sure he does not miss a dose?  Aren  does not currently get his meds managed by Dr. Casey.     At the suggestion of RN Luz Kenney, writer recommended for client to see if the prescription can be transferred to another [facility] that has the medication in stock.  Patient agreed to call his pharmacy and see if other locations had the prescription in stock.  Writer agreed to connect with client further if there were any other options discussed with Dr. Casey's team.    ERMELINDA Spencer on 11/29/2021 at 2:33 PM

## 2021-12-01 ENCOUNTER — HOSPITAL ENCOUNTER (OUTPATIENT)
Dept: BEHAVIORAL HEALTH | Facility: CLINIC | Age: 56
End: 2021-12-01
Attending: PSYCHIATRY & NEUROLOGY
Payer: COMMERCIAL

## 2021-12-01 PROCEDURE — 90853 GROUP PSYCHOTHERAPY: CPT | Mod: GT,95

## 2021-12-01 PROCEDURE — 90853 GROUP PSYCHOTHERAPY: CPT | Mod: GT,95 | Performed by: SOCIAL WORKER

## 2021-12-01 NOTE — GROUP NOTE
Service Modality:  Video Visit     Telemedicine Visit: The patient's condition can be safely assessed and treated via synchronous audio and visual telemedicine encounter.       Reason for Telemedicine Visit: Services only offered telehealth and due to COVID-19.     Originating Site (Patient Location): Patient's home     Distant Site (Provider Location): Provider Remote Setting- Home Office     Consent:  The patient/guardian has verbally consented to: the potential risks and benefits of telemedicine (video visit) versus in person care; bill my insurance or make self-payment for services provided; and responsibility for payment of non-covered services.      Patient would like the video invitation sent by:  My Chart     Mode of Communication:  Video Conference via Medical Zoom     As the provider I attest to compliance with applicable laws and regulations related to telemedicine.    Psychotherapy Group Note    PATIENT'S NAME: Dash Arizmendi  MRN:   [id number]  :   1965  ACCT. NUMBER: [id number]  DATE OF SERVICE: 21  START TIME: 11:00 AM  END TIME: 11:50 AM  FACILITATOR: Mariangel Patiño [facility]  TOPIC:  EBP Group: Coping Skills  [facility] 55+ Program  TRACK: A2    NUMBER OF PARTICIPANTS: 7    Summary of Group / Topics Discussed:  Coping Skills: Additional Coping Skills:  Patients discussed and practiced relapse prevention planning which included making coping cards.  Reviewed the benefits of applying the aforementioned coping strategies.  Patients explored how these strategies might be applied to daily stressors or distressing situations as well as mental health management.    Patient Session Goals / Objectives:    Understand the purpose and benefits of applying coping strategies    Discussed coping cards as a clinical tool    Reviewed community resources for aftercare planning        Patient Participation / Response:  Fully participated with the group by sharing personal reflections /  insights and openly received / provided feedback with other participants.    Demonstrated understanding of topics discussed through group discussion and participation and Practiced 2-3 new coping skills in session    Treatment Plan:  Patient has a current master individualized treatment plan.  See Epic treatment plan for more information.    Mariangel Patiño, LICSW

## 2021-12-01 NOTE — GROUP NOTE
"Process Group Note    PATIENT'S NAME: Dash Arizmendi  MRN:   [id number]  :   1965  ACCT. NUMBER: [id number]  DATE OF SERVICE: 21  START TIME:  9:00 AM  END TIME:  9:50 AM  FACILITATOR: Lana Botello LGSW  TOPIC:  Process Group    Diagnoses:  296.89 Bipolar II Disorder Depressed.  296.32 (F33.1) Major Depressive Disorder, Recurrent Episode, Moderate _  300.02 (F41.1) Generalized Anxiety Disorder.       [facility] 55+ Program  TRACK: A2    NUMBER OF PARTICIPANTS:                                       Service Modality:  Video Visit     Telemedicine Visit: The patient's condition can be safely assessed and treated via synchronous audio and visual telemedicine encounter.      Reason for Telemedicine Visit: Services only offered telehealth    Originating Site (Patient Location): Patient's home    Distant Site (Provider Location): Provider Remote Setting- Home Office    Consent:  The patient/guardian has verbally consented to: the potential risks and benefits of telemedicine (video visit) versus in person care; bill my insurance or make self-payment for services provided; and responsibility for payment of non-covered services.     Patient would like the video invitation sent by:  My Chart    Mode of Communication:  Video Conference via Medical Zoom    As the provider I attest to compliance with applicable laws and regulations related to telemedicine.                Data:    Session content: At the start of this group, patients were invited to check in by identifying themselves, describing their current emotional status, and identifying issues to address in this group.   Area(s) of treatment focus addressed in this session included Symptom Management, Personal Safety and Community Resources/Discharge Planning.  Client reported having \"not a lot today\" to check in about.  Client reported today being the day of his brother's visitation, of which he made the decision not to go.  Client reported reflecting " "on his relationship with his brother today and the need to give himself daniel today.  Client reported difficulty concentrating. \"My wife was taking but I was not there listening\".  Writer and peers offered supportive feedback and validation.  Writer asked client for follow up on his medication.  Client reported the plan to  his medication (Fetzima 40 mg) today.  Client was able to find humor in today as he reflected on his niece's surprising decision to choose a different  home than the one his family was used to. Client did not report any suicidal ideation, plan or intent.      Therapeutic Interventions/Treatment Strategies:  Psychotherapist offered support, feedback and validation and reinforced use of skills. Treatment modalities used include Cognitive Behavioral Therapy. Interventions include Coping Skills: Discussed fostering self compassion.  Discussed issues of grief and loss..    Assessment:    Patient response:   Patient responded to session by accepting feedback, giving feedback and listening    Possible barriers to participation / learning include: and no barriers identified    Health Issues:   None reported       Substance Use Review:   Substance Use: No active concerns identified.    Mental Status/Behavioral Observations  Appearance:   Appropriate   Eye Contact:   Good   Psychomotor Behavior: Normal   Attitude:   Cooperative  Interested Pleasant  Orientation:   All  Speech   Rate / Production: Normal    Volume:  Normal   Mood:    Anxious  Depressed   Affect:    Appropriate   Thought Content:   Clear and Safety denies any current safety concerns including suicidal ideation, self-harm, and homicidal ideation  Thought Form:  Coherent  Logical     Insight:    Good     Plan:     Safety Plan: No current safety concerns identified.  Recommended that patient call 911 or go to the local ED should there be a change in any of these risk factors.     Barriers to treatment: None identified    Patient " Contracts (see media tab):  None    Substance Use: Not addressed in session     Continue or Discharge: Patient will continue in 55+ Program (55+) as planned. Patient is likely to benefit from learning and using skills as they work toward the goals identified in their treatment plan.      Lana Botello, ERMELINDA  December 1, 2021

## 2021-12-01 NOTE — GROUP NOTE
Psychotherapy Group Note    PATIENT'S NAME: Dash Arizmendi  MRN:   [id number]  :   1965  ACCT. NUMBER: [id number]  DATE OF SERVICE: 21  START TIME: 10:00 AM  END TIME: 10:50 AM  FACILITATOR: Lana Botello LGSW  TOPIC: MH EBP Group: Symptom Awareness  [facility] 55+ Program  TRACK: A2    NUMBER OF PARTICIPANTS: 7    Summary of Group / Topics Discussed:  Symptom Awareness: Mood Disorders: Patients received a general overview of mood disorders including depressive disorders, anxiety disorders, and bipolar disorders and how it relates to their current symptoms. The purpose is to promote understanding of their diagnoses and how it impacts their functioning. Patients reviewed their current awareness of symptoms and diagnoses. Patients received information regarding diagnoses, etiology, cultural, and environmental factors as well as impact on functioning.     Patient Session Goals / Objectives:    Discussed patient individual symptoms and experiences    Reviewed diagnostic criteria and etiology of diagnoses                                       Service Modality:  Video Visit     Telemedicine Visit: The patient's condition can be safely assessed and treated via synchronous audio and visual telemedicine encounter.      Reason for Telemedicine Visit: Services only offered telehealth    Originating Site (Patient Location): Patient's home    Distant Site (Provider Location): Provider Remote Setting- Home Office    Consent:  The patient/guardian has verbally consented to: the potential risks and benefits of telemedicine (video visit) versus in person care; bill my insurance or make self-payment for services provided; and responsibility for payment of non-covered services.     Patient would like the video invitation sent by:  My Chart    Mode of Communication:  Video Conference via Medical Zoom    As the provider I attest to compliance with applicable laws and regulations related to telemedicine.             Patient Participation / Response:  Fully participated with the group by sharing personal reflections / insights and openly received / provided feedback with other participants.    Demonstrated understanding of topics discussed through group discussion and participation, Demonstrated understanding of how information regarding symptoms can assist in management of symptoms and Identified / Expressed personal readiness to increase awareness of symptoms and apply skills as necessary    Treatment Plan:  Patient has a current master individualized treatment plan.  See Epic treatment plan for more information.    Lana Botello LGSW

## 2021-12-02 ENCOUNTER — HOSPITAL ENCOUNTER (OUTPATIENT)
Dept: BEHAVIORAL HEALTH | Facility: CLINIC | Age: 56
End: 2021-12-02
Attending: PSYCHIATRY & NEUROLOGY
Payer: COMMERCIAL

## 2021-12-02 PROCEDURE — 90853 GROUP PSYCHOTHERAPY: CPT | Mod: GT,95

## 2021-12-02 PROCEDURE — 90853 GROUP PSYCHOTHERAPY: CPT | Mod: GT,95 | Performed by: SOCIAL WORKER

## 2021-12-02 NOTE — GROUP NOTE
"Process Group Note    PATIENT'S NAME: Dash Arizmendi  MRN:   [id number]  :   1965  ACCT. NUMBER: [id number]  DATE OF SERVICE: 21  START TIME:  9:00 AM  END TIME:  9:50 AM  FACILITATOR: Lana Botello LGSW  TOPIC:  Process Group    Diagnoses:  296.89 Bipolar II Disorder Depressed.  296.32 (F33.1) Major Depressive Disorder, Recurrent Episode, Moderate _  300.02 (F41.1) Generalized Anxiety Disorder.       [facility] 55+ Program  TRACK: A2    NUMBER OF PARTICIPANTS: 7                                      Service Modality:  Video Visit     Telemedicine Visit: The patient's condition can be safely assessed and treated via synchronous audio and visual telemedicine encounter.      Reason for Telemedicine Visit: Services only offered telehealth    Originating Site (Patient Location): Patient's home    Distant Site (Provider Location): Provider Remote Setting- Home Office    Consent:  The patient/guardian has verbally consented to: the potential risks and benefits of telemedicine (video visit) versus in person care; bill my insurance or make self-payment for services provided; and responsibility for payment of non-covered services.     Patient would like the video invitation sent by:  My Chart    Mode of Communication:  Video Conference via Medical Zoom    As the provider I attest to compliance with applicable laws and regulations related to telemedicine.                Data:    Session content: At the start of this group, patients were invited to check in by identifying themselves, describing their current emotional status, and identifying issues to address in this group.   Area(s) of treatment focus addressed in this session included Symptom Management, Personal Safety and Community Resources/Discharge Planning.  Client reported napping \"for a few hours\" after group yesterday.  Client reported feeling frustrated over his decreased ability to concentrate. Client explained that he noticed difficulty with " concentrating when he was studying and reading textbooks to support his career as a .  Client expressed taking intermittent breaks from reading when be noticed his concentration decreasing.  Client also noted struggling with focusing on job interviews and playing a game of online [employer].  Writer assessed if this was a new symptom, to which he stated he had been experiencing this for a few years.  Writer reminded client that lack of concentration can be linked to depression.  Client was hopeful about addressing this difficulty at a psychiatric appointment he had next Wednesday.  Client reports waiting 3.5 months to get into this psychiatrist.  Client did not report any suicidal ideation, plan or intent.      Therapeutic Interventions/Treatment Strategies:  Psychotherapist offered support, feedback and validation and reinforced use of skills. Treatment modalities used include Cognitive Behavioral Therapy. Interventions include Symptoms Management: Promoted understanding of their diagnoses and how it impacts their functioning.    Assessment:    Patient response:   Patient responded to session by accepting feedback, giving feedback and listening    Possible barriers to participation / learning include: and no barriers identified    Health Issues:   None reported       Substance Use Review:   Substance Use: No active concerns identified.    Mental Status/Behavioral Observations  Appearance:   Appropriate   Eye Contact:   Good   Psychomotor Behavior: Normal   Attitude:   Cooperative  Interested Pleasant  Orientation:   All  Speech   Rate / Production: Normal    Volume:  Normal   Mood:    Depressed   Affect:    Appropriate   Thought Content:   Clear and Safety denies any current safety concerns including suicidal ideation, self-harm, and homicidal ideation  Thought Form:  Coherent  Logical     Insight:    Good     Plan:     Safety Plan: No current safety concerns identified.  Recommended that patient call  911 or go to the local ED should there be a change in any of these risk factors.     Barriers to treatment: None identified    Patient Contracts (see media tab):  None    Substance Use: Not addressed in session     Continue or Discharge: Patient will continue in 55+ Program (55+) as planned. Patient is likely to benefit from learning and using skills as they work toward the goals identified in their treatment plan.      ERMELINDA Spencer  December 2, 2021

## 2021-12-02 NOTE — GROUP NOTE
Psychotherapy Group Note    PATIENT'S NAME: Dash Arizmendi  MRN:   [id number]  :   1965  ACCT. NUMBER: [id number]  DATE OF SERVICE: 21  START TIME: 10:00 AM  END TIME: 10:50 AM  FACILITATOR: Lana Botello LGSW  TOPIC: MH EBP Group: Self-Awareness   [employer] [place] 55+ Program  TRACK: A2    NUMBER OF PARTICIPANTS:     Summary of Group / Topics Discussed:  Self-Awareness: Values: Patients identified personal values by examining development of their current values and how their values influence their daily functioning and life choices. Patients explored the impact of their values on their thoughts, feelings, and actions. Patients discussed definition of personal values and how they develop and change over time. The goal is to help patients reconcile value conflicts and achieve balance and flexibility to improve mood and daily functioning.     Patient Session Goals / Objectives:    Examined development of values and impact of values on functioning    Identified and prioritized important values related to current well-being     Identified strategies to change or enhance values to positively impact symptoms    Assisted patients to find ways to adapt functioning to better fit their values                                        Service Modality:  Video Visit     Telemedicine Visit: The patient's condition can be safely assessed and treated via synchronous audio and visual telemedicine encounter.      Reason for Telemedicine Visit: Services only offered telehealth    Originating Site (Patient Location): Patient's home    Distant Site (Provider Location): Provider Remote Setting- Home Office    Consent:  The patient/guardian has verbally consented to: the potential risks and benefits of telemedicine (video visit) versus in person care; bill my insurance or make self-payment for services provided; and responsibility for payment of non-covered services.     Patient would like the video invitation sent by:  My  Chart    Mode of Communication:  Video Conference via Medical Zoom    As the provider I attest to compliance with applicable laws and regulations related to telemedicine.            Patient Participation / Response:  Fully participated with the group by sharing personal reflections / insights and openly received / provided feedback with other participants.    Demonstrated understanding of topics discussed through group discussion and participation, Demonstrated understanding of values, strengths, and challenges to learn about themselves and Identified / Expressed readiness to act intentionally, increase self-compassion, promote personal growth    Treatment Plan:  Patient has a current master individualized treatment plan.  See Epic treatment plan for more information.    Lana Botello LGSW

## 2021-12-02 NOTE — GROUP NOTE
Service Modality:  Video Visit     Telemedicine Visit: The patient's condition can be safely assessed and treated via synchronous audio and visual telemedicine encounter.       Reason for Telemedicine Visit: Services only offered telehealth and due to COVID-19.     Originating Site (Patient Location): Patient's home     Distant Site (Provider Location): Provider Remote Setting- Home Office     Consent:  The patient/guardian has verbally consented to: the potential risks and benefits of telemedicine (video visit) versus in person care; bill my insurance or make self-payment for services provided; and responsibility for payment of non-covered services.      Patient would like the video invitation sent by:  My Chart     Mode of Communication:  Video Conference via Medical Zoom     As the provider I attest to compliance with applicable laws and regulations related to telemedicine.    Psychotherapy Group Note    PATIENT'S NAME: Dash Arizmendi  MRN:   [id number]  :   1965  ACCT. NUMBER: [id number]  DATE OF SERVICE: 21  START TIME: 11:00 AM  END TIME: 11:50 AM  FACILITATOR: Mariangel Patiño LICSW  TOPIC:  EBP Group: Specialty Awareness  [facility] 55+ Program  TRACK: A2    NUMBER OF PARTICIPANTS: 7    Summary of Group / Topics Discussed:  Specialty Topics: Hope: The topic of hope was presented in order to help patients better understand the symptoms of hopelessness and how to become more hopeful. Patients discussed their current awareness of the topic and relevance to their functioning. Individual experiences with symptoms and treatment options were also discussed. Patients explored options for ongoing/future treatment and symptom management.      Patient Session Goals / Objectives:    Discussed definition of hopelessness    Discussed how hopelessness impacts functioning    Set a plan to utilize skills to reduce hopelessness      Patient Participation / Response:  Fully participated with the  group by sharing personal reflections / insights and openly received / provided feedback with other participants.    Demonstrated understanding of topics discussed through group discussion and participation and Practiced skills in session    Treatment Plan:  Patient has a current master individualized treatment plan.  See Epic treatment plan for more information.    Mariangel Patiño, LICSW

## 2021-12-06 ENCOUNTER — HOSPITAL ENCOUNTER (OUTPATIENT)
Dept: BEHAVIORAL HEALTH | Facility: CLINIC | Age: 56
End: 2021-12-06
Attending: PSYCHIATRY & NEUROLOGY
Payer: COMMERCIAL

## 2021-12-06 PROCEDURE — 90853 GROUP PSYCHOTHERAPY: CPT | Mod: GT,95

## 2021-12-06 PROCEDURE — 90853 GROUP PSYCHOTHERAPY: CPT | Mod: GT,95 | Performed by: SOCIAL WORKER

## 2021-12-06 NOTE — GROUP NOTE
"Process Group Note    PATIENT'S NAME: Dash Arizmendi  MRN:   [id number]  :   1965  ACCT. NUMBER: [id number]  DATE OF SERVICE: 21  START TIME:  9:00 AM  END TIME:  9:50 AM  FACILITATOR: Laan Botello LGSW  TOPIC:  Process Group    Diagnoses:  296.89 Bipolar II Disorder Depressed.  296.32 (F33.1) Major Depressive Disorder, Recurrent Episode, Moderate _  300.02 (F41.1) Generalized Anxiety Disorder.       [facility] 55+ Program  TRACK: A2    NUMBER OF PARTICIPANTS: 6                                      Service Modality:  Video Visit     Telemedicine Visit: The patient's condition can be safely assessed and treated via synchronous audio and visual telemedicine encounter.      Reason for Telemedicine Visit: Services only offered telehealth    Originating Site (Patient Location): Patient's home    Distant Site (Provider Location): Provider Remote Setting- Home Office    Consent:  The patient/guardian has verbally consented to: the potential risks and benefits of telemedicine (video visit) versus in person care; bill my insurance or make self-payment for services provided; and responsibility for payment of non-covered services.     Patient would like the video invitation sent by:  My Chart    Mode of Communication:  Video Conference via Medical Zoom    As the provider I attest to compliance with applicable laws and regulations related to telemedicine.                Data:    Session content: At the start of this group, patients were invited to check in by identifying themselves, describing their current emotional status, and identifying issues to address in this group.   Area(s) of treatment focus addressed in this session included Symptom Management, Personal Safety and Community Resources/Discharge Planning.  Client reported feeling cynical today after scheduling appointment's with Dr. Tabor today and his new psychiatrist on Wednesday.  \"It feels like a waste of time to have these appointments less " "than 48 hours apart\".  Writer validated client's frustration and reminded him that provider meetings were largely for insurance purposes.  Client reported receiving a letter from his interim psychiatrist that they were leaving, which reinforced client's cynicism with maintaining psychiatry services.  Client reported a goal to keep and attend his appointments this week with his psychiatrists.  Client will lean on the support his wife to stay accountable.  Client reported cutting two days ago and that his scars were still hurting.  Client reports using the pain as a reminder to avoid self-harm in the future.  Writer asked client about events that led up to self-harm.  Client explained that he felt \"pushed into\" an interaction with his neighbor.  Client explained that his neighbor had cut down a tree and since client is into wood working, his wife asked his neighbor if client could have the wood.  Client also reported \"being tired of myself\" as feelings that led to self-harm.  Client reported no current thoughts of SI or SIB. Client reported working with his individual therapist to resist urges to self-harm.  Client reported being proud of attending group today, despite his ongoing feelings of cynicism.        Therapeutic Interventions/Treatment Strategies:  Psychotherapist offered support, feedback and validation and reinforced use of skills. Treatment modalities used include Cognitive Behavioral Therapy. Interventions include Coping Skills: Assisted patient in identifying 1-2 healthy distraction skills to reduce overall distress and Symptoms Management: Promoted understanding of their diagnoses and how it impacts their functioning.    Assessment:    Patient response:   Patient responded to session by accepting feedback, giving feedback and listening    Possible barriers to participation / learning include: and no barriers identified    Health Issues:   None reported       Substance Use Review:   Substance Use: No " active concerns identified.    Mental Status/Behavioral Observations  Appearance:   Appropriate   Eye Contact:   Good   Psychomotor Behavior: Normal   Attitude:   Cooperative  Interested Pleasant  Orientation:   All  Speech   Rate / Production: Normal    Volume:  Normal   Mood:    Depressed   Affect:    Appropriate   Thought Content:   Clear and Safety denies any current safety concerns including suicidal ideation, self-harm, and homicidal ideation  Thought Form:  Coherent  Logical     Insight:    Good     Plan:     Safety Plan: No current safety concerns identified.  Recommended that patient call 911 or go to the local ED should there be a change in any of these risk factors.     Barriers to treatment: None identified    Patient Contracts (see media tab):  None    Substance Use: Not addressed in session     Continue or Discharge: Patient will continue in 55+ Program (55+) as planned. Patient is likely to benefit from learning and using skills as they work toward the goals identified in their treatment plan.      ERMELINDA Spencer  December 6, 2021

## 2021-12-06 NOTE — GROUP NOTE
Psychoeducation Group Note    PATIENT'S NAME: Dash Arizmendi  MRN:   [id number]  :   1965  ACCT. NUMBER: [id number]  DATE OF SERVICE: 21  START TIME: 10:00 AM  END TIME: 10:50 AM  FACILITATOR: Lana Botello LGSW  TOPIC: CHUCKY RN Group: Health Maintenance  [facility] 55+ Program  TRACK: A2    NUMBER OF PARTICIPANTS: 5    Summary of Group / Topics Discussed:  Health Maintenance: Goal Setting: Meaningful goals can bring a sense of direction and purpose in life.  They also highlight our most important values. Patients were assisted by instructor to identify short term goals to promote their mental health recovery and improve overall health and wellness. Patients were educated on SMART goal setting framework as a strategy to increase outcomes and promote success.    Patient Session Goals / Objectives:  ? Explained the key concepts of SMART goal setting framework  ? Identified three goals successfully using SMART goal setting framework  ? Reviewed concept of balance in wellness as it pertains to goal setting        Patient Participation / Response:  Fully participated with the group by sharing personal reflections / insights and openly received / provided feedback with other participants.    Demonstrated understanding of topics discussed through group discussion and participation and Identified / Expressed personal readiness to practice skills    Treatment Plan:  Patient has a current master individualized treatment plan.  See Epic treatment plan for more information.    ERMELINDA Spencer

## 2021-12-06 NOTE — GROUP NOTE
Service Modality:  Video Visit     Telemedicine Visit: The patient's condition can be safely assessed and treated via synchronous audio and visual telemedicine encounter.       Reason for Telemedicine Visit: Services only offered telehealth and due to COVID-19.     Originating Site (Patient Location): Patient's home     Distant Site (Provider Location): Provider Remote Setting- Home Office     Consent:  The patient/guardian has verbally consented to: the potential risks and benefits of telemedicine (video visit) versus in person care; bill my insurance or make self-payment for services provided; and responsibility for payment of non-covered services.      Patient would like the video invitation sent by:  My Chart     Mode of Communication:  Video Conference via Medical Zoom     As the provider I attest to compliance with applicable laws and regulations related to telemedicine.    Psychotherapy Group Note    PATIENT'S NAME: Dash Arizmendi  MRN:   [id number]  :   1965  ACCT. NUMBER: [id number]  DATE OF SERVICE: 21  START TIME: 11:00 AM  END TIME: 11:50 AM  FACILITATOR: Mariangel Patiño LICSW  TOPIC:  EBP Group: Emotions Management  [facility] 55+ Program  TRACK: A2    NUMBER OF PARTICIPANTS: 5    Summary of Group / Topics Discussed:  Emotions Management: Understanding Emotions: Patients discussed the purpose of emotions and function they serve in our lives.  Reviewed core emotions, why they happen (triggers), and how they occur. The group assisted one anothers' understanding that: emotional experiences are important; difficult emotions have a place in our lives; and the differences between various emotions.    Patient Session Goals / Objectives:    Demonstrate understanding of types various emotions    Identify and discuss specific emotions and when they occur; understand triggers    Understanding the thought process in relation to emotions    Discuss barriers to emotional  regulation      Patient Participation / Response:  Fully participated with the group by sharing personal reflections / insights and openly received / provided feedback with other participants.    Demonstrated understanding of topics discussed through group discussion and participation    Treatment Plan:  Patient has a current master individualized treatment plan.  See Epic treatment plan for more information.    Mariangel Patiño, LICSW

## 2021-12-08 ENCOUNTER — HOSPITAL ENCOUNTER (OUTPATIENT)
Dept: BEHAVIORAL HEALTH | Facility: CLINIC | Age: 56
End: 2021-12-08
Attending: PSYCHIATRY & NEUROLOGY
Payer: COMMERCIAL

## 2021-12-08 PROCEDURE — 90853 GROUP PSYCHOTHERAPY: CPT | Mod: GT,95 | Performed by: SOCIAL WORKER

## 2021-12-08 PROCEDURE — 90853 GROUP PSYCHOTHERAPY: CPT | Mod: GT,95

## 2021-12-08 NOTE — GROUP NOTE
Process Group Note    PATIENT'S NAME: Dash Arizmendi  MRN:   [id number]  :   1965  ACCT. NUMBER: [id number]  DATE OF SERVICE: 21  START TIME:  9:00 AM  END TIME:  9:50 AM  FACILITATOR: Lana Botello LGSW  TOPIC:  Process Group    Diagnoses:  296.89 Bipolar II Disorder Depressed.  296.32 (F33.1) Major Depressive Disorder, Recurrent Episode, Moderate _  300.02 (F41.1) Generalized Anxiety Disorder.       [facility] 55+ Program  TRACK: A2    NUMBER OF PARTICIPANTS:                                       Service Modality:  Video Visit     Telemedicine Visit: The patient's condition can be safely assessed and treated via synchronous audio and visual telemedicine encounter.      Reason for Telemedicine Visit: Services only offered telehealth    Originating Site (Patient Location): Patient's home    Distant Site (Provider Location): Provider Remote Setting- Home Office    Consent:  The patient/guardian has verbally consented to: the potential risks and benefits of telemedicine (video visit) versus in person care; bill my insurance or make self-payment for services provided; and responsibility for payment of non-covered services.     Patient would like the video invitation sent by:  My Chart    Mode of Communication:  Video Conference via Medical Zoom    As the provider I attest to compliance with applicable laws and regulations related to telemedicine.                Data:    Session content: At the start of this group, patients were invited to check in by identifying themselves, describing their current emotional status, and identifying issues to address in this group.   Area(s) of treatment focus addressed in this session included Symptom Management, Personal Safety and Community Resources/Discharge Planning.  Client reported feeling agitated, irritable and stressed due to his psychiatrist appointment later this afternoon.  Client explained that he was experiencing anxiety over the outcome of this  "appointment.  Client hopes to \"get everything I need out of it\" by addressing ongoing medication side effects he had been experiencing since the summer.  He listed tinnitus, tremors and brain fog as symptoms of his medication.  Client reports using his wife as support to ensure he attends this appointment.  Client denies SI, SIB and chemical use.  Client reports being proud of attending group and declined supportive feedback from group.  Client reported being surprised that he has not \"dropped his medication\" as has been the pattern for him in the past.  Client cited seeing a therapist weekly and other movement towards securing mental health services as reasons he has continued to take his medications.  \"I have kept pushing\".  Client reported attempting to secure a [facility]  with the help of his therapist.  He has not heard from a  for one month and as a result, decided to stop pursuing case management services.        Therapeutic Interventions/Treatment Strategies:  Psychotherapist offered support, feedback and validation and reinforced use of skills. Treatment modalities used include Cognitive Behavioral Therapy. Interventions include Relapse Prevention: Facilitated understanding the importance of awareness of factors that contribute to relapse  and Symptoms Management: Promoted understanding of their diagnoses and how it impacts their functioning.    Assessment:    Patient response:   Patient responded to session by accepting feedback, giving feedback and listening    Possible barriers to participation / learning include: and no barriers identified    Health Issues:   None reported       Substance Use Review:   Substance Use: No active concerns identified.    Mental Status/Behavioral Observations  Appearance:   Appropriate   Eye Contact:   Good   Psychomotor Behavior: Normal   Attitude:   Cooperative  Interested Pleasant  Orientation:   All  Speech   Rate / Production: Normal "    Volume:  Normal   Mood:    Anxious  Depressed  Irritable   Affect:    Appropriate   Thought Content:   Clear and Safety denies any current safety concerns including suicidal ideation, self-harm, and homicidal ideation  Thought Form:  Coherent  Logical     Insight:    Good     Plan:     Safety Plan: No current safety concerns identified.  Recommended that patient call 911 or go to the local ED should there be a change in any of these risk factors.     Barriers to treatment: None identified    Patient Contracts (see media tab):  None    Substance Use: Not addressed in session     Continue or Discharge: Patient will continue in 55+ Program (55+) as planned. Patient is likely to benefit from learning and using skills as they work toward the goals identified in their treatment plan.      Lana Botello, ERMELINDA  December 8, 2021

## 2021-12-08 NOTE — GROUP NOTE
Service Modality:  Video Visit     Telemedicine Visit: The patient's condition can be safely assessed and treated via synchronous audio and visual telemedicine encounter.       Reason for Telemedicine Visit: Services only offered telehealth and due to COVID-19.     Originating Site (Patient Location): Patient's home     Distant Site (Provider Location): Provider Remote Setting- Home Office     Consent:  The patient/guardian has verbally consented to: the potential risks and benefits of telemedicine (video visit) versus in person care; bill my insurance or make self-payment for services provided; and responsibility for payment of non-covered services.      Patient would like the video invitation sent by:  My Chart     Mode of Communication:  Video Conference via Medical Zoom     As the provider I attest to compliance with applicable laws and regulations related to telemedicine.    Psychotherapy Group Note    PATIENT'S NAME: Dash Arizmendi  MRN:   [id number]  :   1965  ACCT. NUMBER: [id number]  DATE OF SERVICE: 21  START TIME: 11:00 AM  END TIME: 11:50 AM  FACILITATOR: Mariangel Ptaiño [facility]  TOPIC:  EBP Group: Coping Skills  [facility] 55+ Program  TRACK: A2    NUMBER OF PARTICIPANTS: 6    Summary of Group / Topics Discussed:  Coping Skills: Additional Coping Skills:  Patients discussed the benefits of engaging in leisure activities to increase symptom management and for lifestyle balance.  Reviewed the benefits of applying the aforementioned coping strategies.  Patients explored how these strategies might be applied to daily stressors or distressing situations.    Patient Session Goals / Objectives:    Understand the purpose and benefits of applying coping strategies    Identifying pleasureable, leisure activities to apply in mental health recovery     Learn the physiological benefits within the nervous system and for physical health        Patient Participation / Response:  Fully  participated with the group by sharing personal reflections / insights and openly received / provided feedback with other participants.    Demonstrated understanding of topics discussed through group discussion and participation    Treatment Plan:  Patient has a current master individualized treatment plan.  See Epic treatment plan for more information.    Mariangel Patiño, LICSW

## 2021-12-08 NOTE — GROUP NOTE
Psychotherapy Group Note    PATIENT'S NAME: Dash Arizmendi  MRN:   [id number]  :   1965  ACCT. NUMBER: [id number]  DATE OF SERVICE: 21  START TIME: 10:00 AM  END TIME: 10:50 AM  FACILITATOR: Lana Botello LGSW  TOPIC: MH EBP Group: Coping Skills  [facility] 55+ Program  TRACK: A2    NUMBER OF PARTICIPANTS:     Summary of Group / Topics Discussed:  Coping Skills: Grounding: Patients discussed and practiced strategies to increase attachment / presence to the current moment.  Patients identified situations in which using these strategies will help improve emotion regulation sense of calm in the body.  Reviewed the benefits of applying grounding strategies, as well as past / current practices of each member.  Patients identified situations in which using these strategies would reduce stress. They developed the ability to distinguish when these strategies can be useful in their lives for management and stress and psychological well-being.    Patient Session Goals / Objectives:    Understand the purpose of using grounding strategies to reduce stress.    Verbalize understanding of how and when to apply grounding strategies to reduce distress and increase presence in the moment.    Review patients current grounding practices and discuss a more formal way of practicing and accessing skills.    Practice using various calming strategies (e.g. 5-4-3-2-1; mental and body awareness).    Choose 1-2 grounding strategies to apply during times of distress.                                      Service Modality:  Video Visit     Telemedicine Visit: The patient's condition can be safely assessed and treated via synchronous audio and visual telemedicine encounter.      Reason for Telemedicine Visit: Services only offered telehealth    Originating Site (Patient Location): Patient's home    Distant Site (Provider Location): Provider Remote Setting- Home Office    Consent:  The patient/guardian has verbally consented to:  the potential risks and benefits of telemedicine (video visit) versus in person care; bill my insurance or make self-payment for services provided; and responsibility for payment of non-covered services.     Patient would like the video invitation sent by:  My Chart    Mode of Communication:  Video Conference via Medical Zoom    As the provider I attest to compliance with applicable laws and regulations related to telemedicine.              Patient Participation / Response:  Fully participated with the group by sharing personal reflections / insights and openly received / provided feedback with other participants.    Demonstrated understanding of topics discussed through group discussion and participation, Expressed understanding of the relevance / importance of coping skills at distressing times in life and Demonstrated knowledge of when to consider using a variety of coping skills in daily life    Treatment Plan:  Patient has a current master individualized treatment plan.  See Epic treatment plan for more information.    Lana Botello LGSW

## 2021-12-09 ENCOUNTER — HOSPITAL ENCOUNTER (OUTPATIENT)
Dept: BEHAVIORAL HEALTH | Facility: CLINIC | Age: 56
End: 2021-12-09
Attending: PSYCHIATRY & NEUROLOGY
Payer: COMMERCIAL

## 2021-12-09 PROCEDURE — 90853 GROUP PSYCHOTHERAPY: CPT | Mod: GT,95

## 2021-12-09 PROCEDURE — 90853 GROUP PSYCHOTHERAPY: CPT | Mod: GT,95 | Performed by: SOCIAL WORKER

## 2021-12-09 NOTE — ADDENDUM NOTE
Encounter addended by: Lana Botello LGSW on: 12/9/2021 4:31 PM   Actions taken: Clinical Note Signed
Declined

## 2021-12-09 NOTE — GROUP NOTE
"Process Group Note    PATIENT'S NAME: Dash Arizmendi  MRN:   [id number]  :   1965  ACCT. NUMBER: [id number]  DATE OF SERVICE: 21  START TIME:  9:00 AM  END TIME:  9:50 AM  FACILITATOR: Lana Botello LGSW  TOPIC:  Process Group    Diagnoses:  296.89 Bipolar II Disorder Depressed.  296.32 (F33.1) Major Depressive Disorder, Recurrent Episode, Moderate _  300.02 (F41.1) Generalized Anxiety Disorder.       [facility] 55+ Program  TRACK: A2    NUMBER OF PARTICIPANTS: 6                                      Service Modality:  Video Visit     Telemedicine Visit: The patient's condition can be safely assessed and treated via synchronous audio and visual telemedicine encounter.      Reason for Telemedicine Visit: Services only offered telehealth    Originating Site (Patient Location): Patient's home    Distant Site (Provider Location): Provider Remote Setting- Home Office    Consent:  The patient/guardian has verbally consented to: the potential risks and benefits of telemedicine (video visit) versus in person care; bill my insurance or make self-payment for services provided; and responsibility for payment of non-covered services.     Patient would like the video invitation sent by:  My Chart    Mode of Communication:  Video Conference via Medical Zoom    As the provider I attest to compliance with applicable laws and regulations related to telemedicine.                Data:    Session content: At the start of this group, patients were invited to check in by identifying themselves, describing their current emotional status, and identifying issues to address in this group.   Area(s) of treatment focus addressed in this session included Symptom Management, Personal Safety and Community Resources/Discharge Planning.  Client reported having a \"mostly positive\" experience with his [facility] psychiatrist appointment yesterday.  Client reported technical difficulties and having to meet via phone as the " "\"only negative\".  Client reported taking a nap after the appointment yesterday.  Client scheduled a follow up appointment with his psychiatrist in 8 weeks.  Client reported feeling understood and successful in communicating the side effects of his medication.  Client explained that tinnitus can increase his anxiety symptoms and that his tremors have been \"problematic\".  \"They are worse than Lithium tremors\".   Client's psychiatrist took him off of a current SNRI and put him on a new one to reduce side effects.  His psychiatrist also recommended ECT or Ketamine as other forms of treatment.  Client reported on a goal to do anecdotal research on these two interventions.  Client denied SI, SIB urges and chemical use.        Therapeutic Interventions/Treatment Strategies:  Psychotherapist offered support, feedback and validation and reinforced use of skills. Treatment modalities used include Cognitive Behavioral Therapy. Interventions include Behavioral Activation: Explored how behaviors effect mood and interact with thoughts and feelings and Encouraged strategies to reduce individual procrastination and increase motivation by increasing goal-directed activities to enhance mood and reduce symptoms. and Symptoms Management: Promoted understanding of their diagnoses and how it impacts their functioning.    Assessment:    Patient response:   Patient responded to session by accepting feedback and listening    Possible barriers to participation / learning include: and no barriers identified    Health Issues:   None reported       Substance Use Review:   Substance Use: No active concerns identified.    Mental Status/Behavioral Observations  Appearance:   Appropriate   Eye Contact:   Good   Psychomotor Behavior: Normal   Attitude:   Cooperative  Interested Pleasant  Orientation:   All  Speech   Rate / Production: Normal    Volume:  Normal   Mood:    Anxious  Depressed   Affect:    Appropriate   Thought Content:   Clear and " Safety denies any current safety concerns including suicidal ideation, self-harm, and homicidal ideation  Thought Form:  Coherent  Logical     Insight:    Good     Plan:     Safety Plan: No current safety concerns identified.  Recommended that patient call 911 or go to the local ED should there be a change in any of these risk factors.     Barriers to treatment: None identified    Patient Contracts (see media tab):  None    Substance Use: Not addressed in session     Continue or Discharge: Patient will continue in 55+ Program (55+) as planned. Patient is likely to benefit from learning and using skills as they work toward the goals identified in their treatment plan.      Lana Botello, ERMELINDA  December 9, 2021

## 2021-12-09 NOTE — GROUP NOTE
Psychotherapy Group Note    PATIENT'S NAME: Dash Arizmendi  MRN:   [id number]  :   1965  ACCT. NUMBER: [id number]  DATE OF SERVICE: 21  START TIME: 10:00 AM  END TIME: 10:50 AM  FACILITATOR: Lana Botello LGSW  TOPIC: MH EBP Group: Relationship Skills  [facility] 55+ Program  TRACK: A2    NUMBER OF PARTICIPANTS: 6    Summary of Group / Topics Discussed:  Relationship Skills: Basic Communication: Patients were provided with a general overview of interpersonal communication skills and information about how communication skills impact symptoms and functioning. The goal of this topic is to help patients identify communication issues and gain skills to work towards healthier interpersonal communication. Patients reviewed their current awareness of communication issues and how communication skills impact relationships and functioning.      Patient Session Goals / Objectives:    Identified and discussed patients individual challenges with basic communication    Presented and practiced effective communication skills in session    Assisted patients in implementing more effective communication skills in their relationships                                      Service Modality:  Video Visit     Telemedicine Visit: The patient's condition can be safely assessed and treated via synchronous audio and visual telemedicine encounter.      Reason for Telemedicine Visit: Services only offered telehealth    Originating Site (Patient Location): Patient's home    Distant Site (Provider Location): Provider Remote Setting- Home Office    Consent:  The patient/guardian has verbally consented to: the potential risks and benefits of telemedicine (video visit) versus in person care; bill my insurance or make self-payment for services provided; and responsibility for payment of non-covered services.     Patient would like the video invitation sent by:  My Chart    Mode of Communication:  Video Conference via Medical  Zoom    As the provider I attest to compliance with applicable laws and regulations related to telemedicine.            Patient Participation / Response:  Fully participated with the group by sharing personal reflections / insights and openly received / provided feedback with other participants.    Demonstrated understanding of topics discussed through group discussion and participation and Demonstrated understanding of relationship skills and communication skills    Treatment Plan:  Patient has a current master individualized treatment plan.  See Epic treatment plan for more information.    Lana Botello LGSW

## 2021-12-09 NOTE — PROGRESS NOTES
Acknowledgement of Current Treatment Plan       I have reviewed my treatment plan with my therapist ERMELINDA Spencer on 12/9/2021 at 4:30 PM  .   I agree with the plan as it is written in the electronic health record. (A-2 Track)      Name:      Signature:  Dash Arizmendi       Unable to sign due to COVID.   MEGAN Escobedo, [facility]  Psychotherapist MEGAN Babcock, [facility]   Flex Casey MD  Psychiatrist/Medical Director   Flex Casey MD on 12/13/2021 at 2:02 PM   MEGAN Spencer, [facility]  Psychotherapist   ERMELINDA Spencer on 12/9/2021 at 4:30 PM

## 2021-12-09 NOTE — GROUP NOTE
Service Modality:  Video Visit     Telemedicine Visit: The patient's condition can be safely assessed and treated via synchronous audio and visual telemedicine encounter.       Reason for Telemedicine Visit: Services only offered telehealth and due to COVID-19.     Originating Site (Patient Location): Patient's home     Distant Site (Provider Location): Provider Remote Setting- Home Office     Consent:  The patient/guardian has verbally consented to: the potential risks and benefits of telemedicine (video visit) versus in person care; bill my insurance or make self-payment for services provided; and responsibility for payment of non-covered services.      Patient would like the video invitation sent by:  My Chart     Mode of Communication:  Video Conference via Medical Zoom     As the provider I attest to compliance with applicable laws and regulations related to telemedicine.    Psychotherapy Group Note    PATIENT'S NAME: Dash Arizmendi  MRN:   [id number]  :   1965  ACCT. NUMBER: [id number]  DATE OF SERVICE: 21  START TIME: 11:00 AM  END TIME: 11:50 AM  FACILITATOR: Mariangel Patiño LICSW  TOPIC:  EBP Group: Self-Awareness  [facility] 55+ Program  TRACK: A2    NUMBER OF PARTICIPANTS: 6    Summary of Group / Topics Discussed:  Self-Awareness: Self-Compassion: Patients received overview of key concepts in developing self-compassion. Patients discussed mindfulness, self-kindness, and finding common humanity. Patients identified their current approach to problems in their lives and learned skills for increasing self-compassion. Patients identified ways they can put self-compassion skills into practice and problem solve barriers to application of skills.     Patient Session Goals / Objectives:    [place] components of self-compassion    Identify ways to practice self-compassion in daily life    Problem solve barriers to self-compassion practice    Identified skills to cultivate the SACRED  SELF      Patient Participation / Response:  Fully participated with the group by sharing personal reflections / insights and openly received / provided feedback with other participants.    Demonstrated understanding of topics discussed through group discussion and participation    Treatment Plan:  Patient has a current master individualized treatment plan.  See Epic treatment plan for more information.    Mariangel Patiño, LICSW

## 2021-12-13 ENCOUNTER — HOSPITAL ENCOUNTER (OUTPATIENT)
Dept: BEHAVIORAL HEALTH | Facility: CLINIC | Age: 56
End: 2021-12-13
Attending: PSYCHIATRY & NEUROLOGY
Payer: COMMERCIAL

## 2021-12-13 PROCEDURE — 90853 GROUP PSYCHOTHERAPY: CPT | Mod: GT,95

## 2021-12-13 PROCEDURE — 90853 GROUP PSYCHOTHERAPY: CPT | Mod: GT,95 | Performed by: COUNSELOR

## 2021-12-13 NOTE — GROUP NOTE
Psychotherapy Group Note    PATIENT'S NAME: Dash Arizmendi  MRN:   [id number]  :   1965  ACCT. NUMBER: [id number]  DATE OF SERVICE: 21  START TIME: 10:00 AM  END TIME: 10:50 AM  FACILITATOR: Lana Botello LGSW  TOPIC: MH EBP Group: Coping Skills  [facility] 55+ Program  TRACK: A2    NUMBER OF PARTICIPANTS: 9    Summary of Group / Topics Discussed:  Coping Skills: Grounding: Patients discussed and practiced strategies to increase attachment / presence to the current moment.  Patients identified situations in which using these strategies will help improve emotion regulation sense of calm in the body.  Reviewed the benefits of applying grounding strategies, as well as past / current practices of each member.  Patients identified situations in which using these strategies would reduce stress. They developed the ability to distinguish when these strategies can be useful in their lives for management and stress and psychological well-being.    Patient Session Goals / Objectives:    Understand the purpose of using grounding strategies to reduce stress.    Verbalize understanding of how and when to apply grounding strategies to reduce distress and increase presence in the moment.    Review patients current grounding practices and discuss a more formal way of practicing and accessing skills.    Practice using various calming strategies (e.g. 5-4-3-2-1; mental and body awareness).    Choose 1-2 grounding strategies to apply during times of distress.        Patient Participation / Response:  Fully participated with the group by sharing personal reflections / insights and openly received / provided feedback with other participants.    Demonstrated understanding of topics discussed through group discussion and participation and Expressed understanding of the relevance / importance of coping skills at distressing times in life    Treatment Plan:  Patient has a current master individualized treatment plan.  See  Epic treatment plan for more information.    Lana Botello LGSW

## 2021-12-13 NOTE — GROUP NOTE
"Process Group Note    PATIENT'S NAME: Dash Arizmendi  MRN:   [id number]  :   1965  ACCT. NUMBER: [id number]  DATE OF SERVICE: 21  START TIME:  9:00 AM  END TIME:  9:50 AM  FACILITATOR: Lana Botello LGSW  TOPIC:  Process Group    Diagnoses:  296.89 Bipolar II Disorder Depressed.  296.32 (F33.1) Major Depressive Disorder, Recurrent Episode, Moderate _  300.02 (F41.1) Generalized Anxiety Disorder.       [facility] 55+ Program  TRACK: A2    NUMBER OF PARTICIPANTS: 9                                      Service Modality:  Video Visit     Telemedicine Visit: The patient's condition can be safely assessed and treated via synchronous audio and visual telemedicine encounter.      Reason for Telemedicine Visit: Services only offered telehealth    Originating Site (Patient Location): Patient's home    Distant Site (Provider Location): Provider Remote Setting- Home Office    Consent:  The patient/guardian has verbally consented to: the potential risks and benefits of telemedicine (video visit) versus in person care; bill my insurance or make self-payment for services provided; and responsibility for payment of non-covered services.     Patient would like the video invitation sent by:  My Chart    Mode of Communication:  Video Conference via Medical Zoom    As the provider I attest to compliance with applicable laws and regulations related to telemedicine.                Data:    Session content: At the start of this group, patients were invited to check in by identifying themselves, describing their current emotional status, and identifying issues to address in this group.   Area(s) of treatment focus addressed in this session included Symptom Management, Personal Safety and Community Resources/Discharge Planning.  Client reports having a \"rough\" weekend with depression and \"self esteem issues\". Client slept \"a lot\" over the weekend.  Client reports starting his new psychiatric medications yesterday " "morning and ruled out his increased depressive symptoms as a medication side effect.  Client reports napping on the couch with his dog and attending group as ways to distract himself from intrusive thoughts.  Client's thoughts included \"I'm not as good as I was before. I am not going to be able to get jobs.\" Client reported feeling pressure as he is the sole income provider for his household.  Client continues to experience trouble concentrating and tremors, making it difficult to work on job related tasks. Client explained that his work was his \"identity\" and as a result has trouble accepting his transition to finding a new job.  Writer normalized client's grief response in adjusting to his \"new normal\".  Writer also validated client's effort in searching for a new job. Client reported relying on his belief that \"it'll go away\" to relieve his depressive symptoms.  Client did not report any suicidal ideation, plan or intent.    Therapeutic Interventions/Treatment Strategies:  Psychotherapist offered support, feedback and validation and reinforced use of skills. Treatment modalities used include Cognitive Behavioral Therapy. Interventions include Cognitive Restructuring:  Explored impact of ineffective thoughts / distortions on mood and activity and Assisted patient in formulating new neutral/positive alternatives to challenge less helpful / ineffective thoughts.    Assessment:    Patient response:   Patient responded to session by accepting feedback and listening    Possible barriers to participation / learning include: and no barriers identified    Health Issues:   None reported       Substance Use Review:   Substance Use: No active concerns identified.    Mental Status/Behavioral Observations  Appearance:   Appropriate   Eye Contact:   Good   Psychomotor Behavior: Normal   Attitude:   Cooperative  Interested Pleasant  Orientation:   All  Speech   Rate / Production: Normal    Volume:  Normal   Mood:    Anxious  " Depressed   Affect:    Appropriate   Thought Content:   Clear and Safety denies any current safety concerns including suicidal ideation, self-harm, and homicidal ideation  Thought Form:  Coherent  Logical     Insight:    Good     Plan:     Safety Plan: No current safety concerns identified.  Recommended that patient call 911 or go to the local ED should there be a change in any of these risk factors.     Barriers to treatment: None identified    Patient Contracts (see media tab):  None    Substance Use: Not addressed in session     Continue or Discharge: Patient will continue in 55+ Program (55+) as planned. Patient is likely to benefit from learning and using skills as they work toward the goals identified in their treatment plan.      ERMELINDA Spencer  December 13, 2021

## 2021-12-13 NOTE — GROUP NOTE
Psychoeducation Group Note    PATIENT'S NAME: Dash Arizmendi  MRN:   [id number]  :   1965  ACCT. NUMBER: [id number]  DATE OF SERVICE: 21  START TIME: 11:00 AM  END TIME: 11:50 AM  FACILITATOR: Shelly Scnalon LPCC  TOPIC: CHUCKY RN Group: Health Maintenance  [facility] 55+ Program  TRACK: A2    NUMBER OF PARTICIPANTS: 9    Summary of Group / Topics Discussed:  Health Maintenance: Goal Setting: Meaningful goals can bring a sense of direction and purpose in life.  They also highlight our most important values. Patients were assisted by instructor to identify short term goals to promote their mental health recovery and improve overall health and wellness. Patients were educated on SMART goal setting framework as a strategy to increase outcomes and promote success.    Patient Session Goals / Objectives:  ? Explained the key concepts of SMART goal setting framework  ? Identified three goals successfully using SMART goal setting framework  ? Reviewed concept of balance in wellness as it pertains to goal setting                                        Service Modality:  Video Visit     Telemedicine Visit: The patient's condition can be safely assessed and treated via synchronous audio and visual telemedicine encounter.      Reason for Telemedicine Visit: Services only offered telehealth    Originating Site (Patient Location): Patient's home    Distant Site (Provider Location): Provider Remote Setting- Home Office    Consent:  The patient/guardian has verbally consented to: the potential risks and benefits of telemedicine (video visit) versus in person care; bill my insurance or make self-payment for services provided; and responsibility for payment of non-covered services.     Patient would like the video invitation sent by:  My Chart    Mode of Communication:  Video Conference via Medical Zoom    As the provider I attest to compliance with applicable laws and regulations related to telemedicine.             Patient Participation / Response:  Fully participated with the group by sharing personal reflections / insights and openly received / provided feedback with other participants.    Demonstrated understanding of topics discussed through group discussion and participation and Identified / Expressed personal readiness to practice skills    Treatment Plan:  Patient has a current master individualized treatment plan.  See Epic treatment plan for more information.    Shelly Scanlon, LPCC

## 2021-12-15 ENCOUNTER — HOSPITAL ENCOUNTER (OUTPATIENT)
Dept: BEHAVIORAL HEALTH | Facility: CLINIC | Age: 56
End: 2021-12-15
Attending: PSYCHIATRY & NEUROLOGY
Payer: COMMERCIAL

## 2021-12-15 PROCEDURE — 90853 GROUP PSYCHOTHERAPY: CPT | Mod: GT,95 | Performed by: SOCIAL WORKER

## 2021-12-15 PROCEDURE — 90853 GROUP PSYCHOTHERAPY: CPT | Mod: GT,95

## 2021-12-15 NOTE — GROUP NOTE
Psychotherapy Group Note    PATIENT'S NAME: Dash Arizmendi  MRN:   [id number]  :   1965  ACCT. NUMBER: [id number]  DATE OF SERVICE: 12/15/21  START TIME: 10:00 AM  END TIME: 10:50 AM  FACILITATOR: Lana Botello LGSW  TOPIC: MH EBP Group: Relationship Skills  [facility] 55+ Program  TRACK: A2    NUMBER OF PARTICIPANTS: 8    Summary of Group / Topics Discussed:  Relationship Skills: Assertive Communication: Patients were provided with a general overview of assertive communication skills and how practicing assertive communication skills will assist patients in developing healthier and more effective relationships. Patients reviewed their current awareness on ability to practice assertive communication, ways to increase assertive communication, and identified/problem solved barriers to assertive communication.     Patient Session Goals / Objectives:    Identified and discussed patient individual challenges with communication    Presented and practiced effective communication skills in session    Assisted patients in implementing more effective communication skills in their relationships                                        Service Modality:  Video Visit     Telemedicine Visit: The patient's condition can be safely assessed and treated via synchronous audio and visual telemedicine encounter.      Reason for Telemedicine Visit: Services only offered telehealth    Originating Site (Patient Location): Patient's home    Distant Site (Provider Location): Provider Remote Setting- Home Office    Consent:  The patient/guardian has verbally consented to: the potential risks and benefits of telemedicine (video visit) versus in person care; bill my insurance or make self-payment for services provided; and responsibility for payment of non-covered services.     Patient would like the video invitation sent by:  My Chart    Mode of Communication:  Video Conference via Medical Zoom    As the provider I attest to  compliance with applicable laws and regulations related to telemedicine.            Patient Participation / Response:  Moderately participated, sharing some personal reflections / insights and adequately adequately received / provided feedback with other participants.    Demonstrated understanding of topics discussed through group discussion and participation    Treatment Plan:  Patient has a current master individualized treatment plan.  See Epic treatment plan for more information.    Lana Botello LGSW

## 2021-12-16 ENCOUNTER — HOSPITAL ENCOUNTER (OUTPATIENT)
Dept: BEHAVIORAL HEALTH | Facility: CLINIC | Age: 56
End: 2021-12-16
Attending: PSYCHIATRY & NEUROLOGY
Payer: COMMERCIAL

## 2021-12-16 PROCEDURE — 90853 GROUP PSYCHOTHERAPY: CPT | Mod: GT,95

## 2021-12-16 PROCEDURE — 90853 GROUP PSYCHOTHERAPY: CPT | Mod: GT,95 | Performed by: SOCIAL WORKER

## 2021-12-16 NOTE — GROUP NOTE
Service Modality:  Video Visit     Telemedicine Visit: The patient's condition can be safely assessed and treated via synchronous audio and visual telemedicine encounter.       Reason for Telemedicine Visit: Services only offered telehealth and due to COVID-19.     Originating Site (Patient Location): Patient's home     Distant Site (Provider Location): Provider Remote Setting- Home Office     Consent:  The patient/guardian has verbally consented to: the potential risks and benefits of telemedicine (video visit) versus in person care; bill my insurance or make self-payment for services provided; and responsibility for payment of non-covered services.      Patient would like the video invitation sent by:  My Chart     Mode of Communication:  Video Conference via Medical Zoom     As the provider I attest to compliance with applicable laws and regulations related to telemedicine.    Psychotherapy Group Note    PATIENT'S NAME: Dash Arizmendi  MRN:   [id number]  :   1965  ACCT. NUMBER: [id number]  DATE OF SERVICE: 12/15/21  START TIME: 11:00 AM  END TIME: 11:50 AM  FACILITATOR: Mariangel Patiño [facility]  TOPIC:  EBP Group: Coping Skills  [facility] 55+ Program  TRACK: A2    NUMBER OF PARTICIPANTS: 8    Summary of Group / Topics Discussed:  Coping Skills: Additional Coping Skills:  Patients discussed and practiced stress management strategies.  Reviewed the benefits of applying the aforementioned coping strategies.  Patients explored how these strategies might be applied to daily stressors or distressing situations.    Patient Session Goals / Objectives:    Understand the purpose and benefits of applying  coping strategies    Educated about the physiology of stress in the Nervous System    Address barriers to utilizing coping skills when in distress.        Patient Participation / Response:  Fully participated with the group by sharing personal reflections / insights and openly received / provided  feedback with other participants.    Demonstrated understanding of topics discussed through group discussion and participation    Treatment Plan:  Patient has a current master individualized treatment plan.  See Epic treatment plan for more information.    Mariangel Patiño, MARCOSSW

## 2021-12-16 NOTE — GROUP NOTE
Psychoeducation Group Note    PATIENT'S NAME: Dash Arizmendi  MRN:   [id number]  :   1965  ACCT. NUMBER: [id number]  DATE OF SERVICE: 21  START TIME: 10:00 AM  END TIME: 10:50 AM  FACILITATOR: Lana Botello LGSW  TOPIC: CHUCKY RN Group: Mental Health Maintenance  [facility] 55+ Program  TRACK: A2    NUMBER OF PARTICIPANTS: 7    Summary of Group / Topics Discussed:  Mental Health Maintenance:  Vulnerability: In this group, the concept of vulnerability was explored through the viewing, discussion, and self-reflection of the Yazmin Lambert Talk Titled,  The Power of Vulnerability.      Patient Session Goals / Objectives:  ? Defined and described definition of vulnerability   ? Identified 2 or more ways of practicing authenticity                                       Service Modality:  Video Visit     Telemedicine Visit: The patient's condition can be safely assessed and treated via synchronous audio and visual telemedicine encounter.      Reason for Telemedicine Visit: Services only offered telehealth    Originating Site (Patient Location): Patient's home    Distant Site (Provider Location): Provider Remote Setting- Home Office    Consent:  The patient/guardian has verbally consented to: the potential risks and benefits of telemedicine (video visit) versus in person care; bill my insurance or make self-payment for services provided; and responsibility for payment of non-covered services.     Patient would like the video invitation sent by:  My Chart    Mode of Communication:  Video Conference via Medical Zoom    As the provider I attest to compliance with applicable laws and regulations related to telemedicine.              Patient Participation / Response:  Fully participated with the group by sharing personal reflections / insights and openly received / provided feedback with other participants.    Demonstrated understanding of topics discussed through group discussion and participation and  Identified / Expressed personal readiness to practice skills    Treatment Plan:  Patient has a current master individualized treatment plan.  See Epic treatment plan for more information.    Lana Botello LGSW

## 2021-12-16 NOTE — GROUP NOTE
Service Modality:  Video Visit     Telemedicine Visit: The patient's condition can be safely assessed and treated via synchronous audio and visual telemedicine encounter.       Reason for Telemedicine Visit: Services only offered telehealth and due to COVID-19.     Originating Site (Patient Location): Patient's home     Distant Site (Provider Location): Provider Remote Setting- Home Office     Consent:  The patient/guardian has verbally consented to: the potential risks and benefits of telemedicine (video visit) versus in person care; bill my insurance or make self-payment for services provided; and responsibility for payment of non-covered services.      Patient would like the video invitation sent by:  My Chart     Mode of Communication:  Video Conference via Medical Zoom     As the provider I attest to compliance with applicable laws and regulations related to telemedicine.    Psychotherapy Group Note    PATIENT'S NAME: Dash Arizmendi  MRN:   [id number]  :   1965  ACCT. NUMBER: [id number]  DATE OF SERVICE: 21  START TIME: 11:00 AM  END TIME: 11:50 AM  FACILITATOR: Mariangel Patiño [facility]  TOPIC:  EBP Group: Coping Skills  [facility] 55+ Program  TRACK: A2    NUMBER OF PARTICIPANTS: 8    Summary of Group / Topics Discussed:  Coping Skills: Additional Coping Skills:  Patients discussed and practiced how humor can be used as a stress management strategies.  Reviewed the benefits of applying the aforementioned coping strategies.  Patients explored how these strategies might be applied to daily stressors or distressing situations.    Patient Session Goals / Objectives:    Understand the purpose and benefits of applying  coping strategies    Educated about the physiology of stress in the Nervous System    Created a wellness plan to incorporate humor activites.        Patient Participation / Response:  Fully participated with the group by sharing personal reflections / insights and openly  received / provided feedback with other participants.    Demonstrated understanding of topics discussed through group discussion and participation    Treatment Plan:  Patient has a current master individualized treatment plan.  See Epic treatment plan for more information.    Mariangel Patiño, MARCOSSW

## 2021-12-16 NOTE — GROUP NOTE
"Process Group Note    PATIENT'S NAME: Dash Arizmendi  MRN:   [id number]  :   1965  ACCT. NUMBER: [id number]  DATE OF SERVICE: 21  START TIME:  9:00 AM  END TIME:  9:50 AM  FACILITATOR: Lana Botello LGSW  TOPIC:  Process Group    Diagnoses:  296.89 Bipolar II Disorder Depressed.  296.32 (F33.1) Major Depressive Disorder, Recurrent Episode, Moderate _  300.02 (F41.1) Generalized Anxiety Disorder.       [facility] 55+ Program  TRACK: A2    NUMBER OF PARTICIPANTS: 7                                      Service Modality:  Video Visit     Telemedicine Visit: The patient's condition can be safely assessed and treated via synchronous audio and visual telemedicine encounter.      Reason for Telemedicine Visit: Services only offered telehealth    Originating Site (Patient Location): Patient's home    Distant Site (Provider Location): Provider Remote Setting- Home Office    Consent:  The patient/guardian has verbally consented to: the potential risks and benefits of telemedicine (video visit) versus in person care; bill my insurance or make self-payment for services provided; and responsibility for payment of non-covered services.     Patient would like the video invitation sent by:  My Chart    Mode of Communication:  Video Conference via Medical Zoom    As the provider I attest to compliance with applicable laws and regulations related to telemedicine.                Data:    Session content: At the start of this group, patients were invited to check in by identifying themselves, describing their current emotional status, and identifying issues to address in this group.   Area(s) of treatment focus addressed in this session included Symptom Management, Personal Safety and Community Resources/Discharge Planning.  Client reported feeling \"down\" yesterday in relation to his treatment progress.  Reading visit notes from his recent psychiatrist appointment triggered intrusive thoughts related to where he " "expected to be after 2 years of therapy, group therapy and psychiatric treatment interventions.  \"I was at [employer] 2 years ago and have been out of work ever since.  This is not where I thought I'd be\".  Client reported these thoughts also triggered anxiety, although he was able to avoid a panic attack by practicing deep breathing exercises.  Client reported decreased intrusive thoughts today.  Client reported using positive self-talk to tell himself \"I still got some more to do\" and \"It'll pass\".  Client denied SI, SIB and chemical use.  Client will be absent on Monday 12/20/2021 for third hour to attend an orthodontist appointment.      Therapeutic Interventions/Treatment Strategies:  Psychotherapist offered support, feedback and validation and reinforced use of skills. Treatment modalities used include Cognitive Behavioral Therapy. Interventions include Cognitive Restructuring:  Assisted patient in identifying new neutral/positive core beliefs and Coping Skills: Discussed how the use of intentional \"in the moment\" actions can help reduce distress and Reviewed patients current calming practices and discussed a more formal way of practicing and accessing skills.    Assessment:    Patient response:   Patient responded to session by accepting feedback and listening    Possible barriers to participation / learning include: and no barriers identified    Health Issues:   None reported       Substance Use Review:   Substance Use: No active concerns identified.    Mental Status/Behavioral Observations  Appearance:   Appropriate   Eye Contact:   Good   Psychomotor Behavior: Normal   Attitude:   Cooperative  Interested Pleasant  Orientation:   All  Speech   Rate / Production: Normal    Volume:  Normal   Mood:    Anxious  Depressed   Affect:    Appropriate   Thought Content:   Clear and Safety denies any current safety concerns including suicidal ideation, self-harm, and homicidal ideation  Thought Form:  Coherent  " Logical     Insight:    Good     Plan:     Safety Plan: No current safety concerns identified.  Recommended that patient call 911 or go to the local ED should there be a change in any of these risk factors.     Barriers to treatment: None identified    Patient Contracts (see media tab):  None    Substance Use: Not addressed in session     Continue or Discharge: Patient will continue in 55+ Program (55+) as planned. Patient is likely to benefit from learning and using skills as they work toward the goals identified in their treatment plan.      ERMELINDA Spencer  December 16, 2021

## 2021-12-20 ENCOUNTER — HOSPITAL ENCOUNTER (OUTPATIENT)
Dept: BEHAVIORAL HEALTH | Facility: CLINIC | Age: 56
End: 2021-12-20
Attending: PSYCHIATRY & NEUROLOGY
Payer: COMMERCIAL

## 2021-12-20 PROCEDURE — 90853 GROUP PSYCHOTHERAPY: CPT | Mod: GT,95

## 2021-12-20 NOTE — GROUP NOTE
Psychotherapy Group Note    PATIENT'S NAME: Dash Arizmendi  MRN:   [id number]  :   1965  ACCT. NUMBER: [id number]  DATE OF SERVICE: 21  START TIME: 10:00 AM  END TIME: 10:50 AM  FACILITATOR: Lana Botello LGSW  TOPIC: MH EBP Group: Coping Skills  [facility] 55+ Program  TRACK: A2    NUMBER OF PARTICIPANTS: 8    Summary of Group / Topics Discussed:  Coping Skills: Self-Soothe: Patients learned to apply self-soothe as a way to decrease heightened stress in the moment.  Patients identified situations that necessitate self-soothe strategies.  They focused on ways to manage physical symptoms of distress using the senses. They discussed how to distinguish when this can be useful in their lives when other strategies are more relevant or helpful.    Patient Session Goals / Objectives:    Understand the purpose of using the senses to decrease distress    Process what happens in the body when using self-soothe strategies    Demonstrate understanding of when to use self-soothe strategies    Identify and problem solve barriers to applying self-soothe strategies.    Choose 1-2 self-soothe strategies to apply during times of distress.                                      Service Modality:  Video Visit     Telemedicine Visit: The patient's condition can be safely assessed and treated via synchronous audio and visual telemedicine encounter.      Reason for Telemedicine Visit: Services only offered telehealth    Originating Site (Patient Location): Patient's home    Distant Site (Provider Location): Provider Remote Setting- Home Office    Consent:  The patient/guardian has verbally consented to: the potential risks and benefits of telemedicine (video visit) versus in person care; bill my insurance or make self-payment for services provided; and responsibility for payment of non-covered services.     Patient would like the video invitation sent by:  My Chart    Mode of Communication:  Video Conference via Medical  Zoom    As the provider I attest to compliance with applicable laws and regulations related to telemedicine.              Patient Participation / Response:  Fully participated with the group by sharing personal reflections / insights and openly received / provided feedback with other participants.    Demonstrated understanding of topics discussed through group discussion and participation and Expressed understanding of the relevance / importance of coping skills at distressing times in life    Treatment Plan:  Patient has a current master individualized treatment plan.  See Epic treatment plan for more information.    Lana Botello LGSW

## 2021-12-20 NOTE — GROUP NOTE
"Process Group Note    PATIENT'S NAME: Dash Arizmendi  MRN:   [id number]  :   1965  ACCT. NUMBER: [id number]  DATE OF SERVICE: 21  START TIME:  9:00 AM  END TIME:  9:50 AM  FACILITATOR: Lana Botello LGSW  TOPIC:  Process Group    Diagnoses:  296.89 Bipolar II Disorder Depressed.  296.32 (F33.1) Major Depressive Disorder, Recurrent Episode, Moderate _  300.02 (F41.1) Generalized Anxiety Disorder.       [facility] 55+ Program  TRACK: A2    NUMBER OF PARTICIPANTS: 8                                      Service Modality:  Video Visit     Telemedicine Visit: The patient's condition can be safely assessed and treated via synchronous audio and visual telemedicine encounter.      Reason for Telemedicine Visit: Services only offered telehealth    Originating Site (Patient Location): Patient's home    Distant Site (Provider Location): Provider Remote Setting- Home Office    Consent:  The patient/guardian has verbally consented to: the potential risks and benefits of telemedicine (video visit) versus in person care; bill my insurance or make self-payment for services provided; and responsibility for payment of non-covered services.     Patient would like the video invitation sent by:  My Chart    Mode of Communication:  Video Conference via Medical Zoom    As the provider I attest to compliance with applicable laws and regulations related to telemedicine.                Data:    Session content: At the start of this group, patients were invited to check in by identifying themselves, describing their current emotional status, and identifying issues to address in this group.   Area(s) of treatment focus addressed in this session included Symptom Management, Personal Safety and Community Resources/Discharge Planning.  Client reported purchasing a candle over the weekend to use as a self-soothing strategy.  Client pointed out the sound of the wood wick and smell of the \"campfire smoke\" sent as a way to engage " "multiple senses.  He reported having a \"slow\" weekend where he watched movies and napped.  Client reported finishing the \"scaffolding\" of a software project he has been working on.  He expressed frustration that it took him two weeks to get done versus his usual \"1 hour\".  Client reported working on reminding himself to take things \"one step at a time\".  He also acknowledged the delay in psychiatric services and untreated medication side effects as a reason why he has continued to have poor concentration.    Writer validated client's frustration and work towards adjusting to a new normal.  He clarified that he is working towards \"understanding\" his symptoms instead of \"accepting\" them.  \"I can't keep going at this pace, I still want to get back to the pace I was at\".   Client plans to spend his janet at home to be \"COVID cautious\". Client did not report any suicidal ideation, plan or intent.      Therapeutic Interventions/Treatment Strategies:  Psychotherapist offered support, feedback and validation and reinforced use of skills. Treatment modalities used include Cognitive Behavioral Therapy. Interventions include Behavioral Activation: Encouraged strategies to reduce individual procrastination and increase motivation by increasing goal-directed activities to enhance mood and reduce symptoms. and Cognitive Restructuring:  Assisted patient in identifying new neutral/positive core beliefs.    Assessment:    Patient response:   Patient responded to session by accepting feedback and listening    Possible barriers to participation / learning include: and no barriers identified    Health Issues:   None reported       Substance Use Review:   Substance Use: No active concerns identified.    Mental Status/Behavioral Observations  Appearance:   Appropriate   Eye Contact:   Good   Psychomotor Behavior: Normal   Attitude:   Cooperative  Interested Pleasant  Orientation:   All  Speech   Rate / Production: Normal "    Volume:  Normal   Mood:    Depressed   Affect:    Appropriate   Thought Content:   Clear and Safety denies any current safety concerns including suicidal ideation, self-harm, and homicidal ideation  Thought Form:  Coherent  Logical     Insight:    Good     Plan:     Safety Plan: No current safety concerns identified.  Recommended that patient call 911 or go to the local ED should there be a change in any of these risk factors.     Barriers to treatment: None identified    Patient Contracts (see media tab):  None    Substance Use: Not addressed in session     Continue or Discharge: Patient will continue in 55+ Program (55+) as planned. Patient is likely to benefit from learning and using skills as they work toward the goals identified in their treatment plan.      ERMELINDA Spencer  December 20, 2021

## 2021-12-22 ENCOUNTER — HOSPITAL ENCOUNTER (OUTPATIENT)
Dept: BEHAVIORAL HEALTH | Facility: CLINIC | Age: 56
End: 2021-12-22
Attending: PSYCHIATRY & NEUROLOGY
Payer: COMMERCIAL

## 2021-12-22 PROCEDURE — 90853 GROUP PSYCHOTHERAPY: CPT | Mod: GT,95 | Performed by: COUNSELOR

## 2021-12-22 PROCEDURE — 90853 GROUP PSYCHOTHERAPY: CPT | Mod: GT,95

## 2021-12-22 NOTE — GROUP NOTE
Psychotherapy Group Note    PATIENT'S NAME: Dash Arizmendi  MRN:   [id number]  :   1965  ACCT. NUMBER: [id number]  DATE OF SERVICE: 21  START TIME: 10:00 AM  END TIME: 10:50 AM  FACILITATOR: Lana Botello LGSW  TOPIC: MH EBP Group: Specialty Awareness  [facility] 55+ Program  TRACK: A2    NUMBER OF PARTICIPANTS:     Summary of Group / Topics Discussed:  Specialty Topics: Life Transitions: The topic of life transitions was presented in order to help patients to better understand the challenges presented by life transitions, and how to best navigate them. Exploring the phases of transition and how one works through them was discussed. Patients were provided with information regarding community resources.     Patient Session Goals / Objectives:    Discussed the timing and nature of major life transitions    Explored how life transitions may impact mental health and functioning    Discussed coping strategies to manage symptoms and help with transitioning    Discussed and planned a successful transition                                        Service Modality:  Video Visit     Telemedicine Visit: The patient's condition can be safely assessed and treated via synchronous audio and visual telemedicine encounter.      Reason for Telemedicine Visit: Services only offered telehealth    Originating Site (Patient Location): Patient's home    Distant Site (Provider Location): Provider Remote Setting- Home Office    Consent:  The patient/guardian has verbally consented to: the potential risks and benefits of telemedicine (video visit) versus in person care; bill my insurance or make self-payment for services provided; and responsibility for payment of non-covered services.     Patient would like the video invitation sent by:  My Chart    Mode of Communication:  Video Conference via Medical Zoom    As the provider I attest to compliance with applicable laws and regulations related to telemedicine.             Patient Participation / Response:  Moderately participated, sharing some personal reflections / insights and adequately adequately received / provided feedback with other participants.    Demonstrated understanding of topics discussed through group discussion and participation    Treatment Plan:  Patient has a current master individualized treatment plan.  See Epic treatment plan for more information.    Lana Botello LGSW

## 2021-12-22 NOTE — GROUP NOTE
Psychotherapy Group Note    PATIENT'S NAME: Dash Arizmendi  MRN:   [id number]  :   1965  ACCT. NUMBER: [id number]  DATE OF SERVICE: 21  START TIME: 11:00 AM  END TIME: 11:50 AM  FACILITATOR: Shelly Scanlon LPCC  TOPIC: MH EBP Group: Coping Skills  [facility] 55+ Program  TRACK: A-2    NUMBER OF PARTICIPANTS: 8    Summary of Group / Topics Discussed:  Coping Skills: Distraction: Patients learned to mindfully use distraction as a way to decrease heightened stress in the moment.  Patients will identified situations that necessitate healthy distraction strategies.  They explored ways to manage physical symptoms of distress using distraction. The group began to distinguish when this can be useful in their lives or when other strategies would be more relevant or helpful.    Patient Session Goals / Objectives:    Understand the purpose and benefits of using healthy distraction to decrease distress.    Process what happens in the body when using distraction strategies.    Demonstrate understanding of when to use distraction strategies.    Explore patient s current distraction activities, and how to take a more intentional approach to the use of distraction.    Identify and problem solve barriers to applying distraction strategies.    Choose 1-2 healthy distraction strategies to apply during times of distress.                                      Service Modality:  Video Visit     Telemedicine Visit: The patient's condition can be safely assessed and treated via synchronous audio and visual telemedicine encounter.      Reason for Telemedicine Visit: Services only offered telehealth    Originating Site (Patient Location): Patient's home    Distant Site (Provider Location): Provider Remote Setting- Home Office    Consent:  The patient/guardian has verbally consented to: the potential risks and benefits of telemedicine (video visit) versus in person care; bill my insurance or make self-payment for  services provided; and responsibility for payment of non-covered services.     Patient would like the video invitation sent by:  My Chart    Mode of Communication:  Video Conference via Medical Zoom    As the provider I attest to compliance with applicable laws and regulations related to telemedicine.              Patient Participation / Response:  Fully participated with the group by sharing personal reflections / insights and openly received / provided feedback with other participants.    Demonstrated understanding of topics discussed through group discussion and participation, Expressed understanding of the relevance / importance of coping skills at distressing times in life and Demonstrated knowledge of when to consider using a variety of coping skills in daily life    Treatment Plan:  Patient has a current master individualized treatment plan.  See Epic treatment plan for more information.    Shelly Scanlon, [facility]

## 2021-12-22 NOTE — GROUP NOTE
"Process Group Note    PATIENT'S NAME: Dash Arizmendi  MRN:   [id number]  :   1965  ACCT. NUMBER: [id number]  DATE OF SERVICE: 21  START TIME:  9:00 AM  END TIME:  9:50 AM  FACILITATOR: Lana Botello LGSW  TOPIC:  Process Group    Diagnoses:  296.89 Bipolar II Disorder Depressed.  296.32 (F33.1) Major Depressive Disorder, Recurrent Episode, Moderate _  300.02 (F41.1) Generalized Anxiety Disorder.       [facility] 55+ [facility]  TRACK: A2    NUMBER OF PARTICIPANTS: 9                                      Service Modality:  Video Visit     Telemedicine Visit: The patient's condition can be safely assessed and treated via synchronous audio and visual telemedicine encounter.      Reason for Telemedicine Visit: Services only offered telehealth    Originating Site (Patient Location): Patient's home    Distant Site (Provider Location): Provider Remote Setting- Home Office    Consent:  The patient/guardian has verbally consented to: the potential risks and benefits of telemedicine (video visit) versus in person care; bill my insurance or make self-payment for services provided; and responsibility for payment of non-covered services.     Patient would like the video invitation sent by:  My Chart    Mode of Communication:  Video Conference via Medical Zoom    As the provider I attest to compliance with applicable laws and regulations related to telemedicine.                Data:    Session content: At the start of this group, patients were invited to check in by identifying themselves, describing their current emotional status, and identifying issues to address in this group.   Area(s) of treatment focus addressed in this session included Symptom Management, Personal Safety and Community Resources/Discharge Planning.  Client reported having a \"big day\" yesterday with an individual therapy and men's group meetings.  He reported feeling \"emotionally tired\" today as a result.  Client made the decision with his " wife to start Ketamine treatments at the recommendation of his psychiatrist.  He reported feeling relief that Ketamine was covered by insurance.  He reported anxiety with making the phone call to report on his decision.  Client clarified that while is not anxious to start treatment, he is anxious in what extra care coordination this might require.  Client reported his therapist having questions on if his Ketamine treatment required him to have a specialized individual therapist.   He reported leaning on the support of his wife to help make the call.  He reported feeling hopeful about this treatment.  Client did not report any suicidal ideation, plan or intent.      Therapeutic Interventions/Treatment Strategies:  Psychotherapist offered support, feedback and validation and reinforced use of skills. Treatment modalities used include Cognitive Behavioral Therapy. Interventions include Coping Skills: Discussed requesting support from his wife when needed. and Symptoms Management: Promoted understanding of their diagnoses and how it impacts their functioning.    Assessment:    Patient response:   Patient responded to session by accepting feedback and listening    Possible barriers to participation / learning include: and no barriers identified    Health Issues:   None reported       Substance Use Review:   Substance Use: No active concerns identified.    Mental Status/Behavioral Observations  Appearance:   Appropriate   Eye Contact:   Good   Psychomotor Behavior: Normal   Attitude:   Cooperative  Interested Pleasant  Orientation:   All  Speech   Rate / Production: Normal    Volume:  Normal   Mood:    Anxious  Depressed   Affect:    Appropriate   Thought Content:   Clear and Safety denies any current safety concerns including suicidal ideation, self-harm, and homicidal ideation  Thought Form:  Coherent  Logical     Insight:    Good     Plan:     Safety Plan: No current safety concerns identified.  Recommended that  patient call 911 or go to the local ED should there be a change in any of these risk factors.     Barriers to treatment: None identified    Patient Contracts (see media tab):  None    Substance Use: Not addressed in session     Continue or Discharge: Patient will continue in 55+ Program (55+) as planned. Patient is likely to benefit from learning and using skills as they work toward the goals identified in their treatment plan.      Lana Botello, ERMELINDA  December 22, 2021

## 2021-12-23 ENCOUNTER — HOSPITAL ENCOUNTER (OUTPATIENT)
Dept: BEHAVIORAL HEALTH | Facility: CLINIC | Age: 56
End: 2021-12-23
Attending: PSYCHIATRY & NEUROLOGY
Payer: COMMERCIAL

## 2021-12-23 PROCEDURE — 90853 GROUP PSYCHOTHERAPY: CPT | Mod: GT,95

## 2021-12-23 NOTE — GROUP NOTE
Psychotherapy Group Note    PATIENT'S NAME: Dash Arizmendi  MRN:   [id number]  :   1965  ACCT. NUMBER: [id number]  DATE OF SERVICE: 21  START TIME: 10:00 AM  END TIME: 10:50 AM  FACILITATOR: Lana Botello LGSW  TOPIC: MH EBP Group: Emotions Management  [facility] 55+ Program  TRACK: A2    NUMBER OF PARTICIPANTS: 9    Summary of Group / Topics Discussed:  Emotions Management: Understanding Emotions: Patients discussed the purpose of emotions and function they serve in our lives.  Reviewed core emotions, why they happen (triggers), and how they occur. The group assisted one anothers' understanding that: emotional experiences are important; difficult emotions have a place in our lives; and the differences between various emotions.    Patient Session Goals / Objectives:    Demonstrate understanding of types various emotions    Identify and discuss specific emotions and when they occur; understand triggers    Discuss barriers to emotional regulation                                        Service Modality:  Video Visit     Telemedicine Visit: The patient's condition can be safely assessed and treated via synchronous audio and visual telemedicine encounter.      Reason for Telemedicine Visit: Services only offered telehealth    Originating Site (Patient Location): Patient's home    Distant Site (Provider Location): Provider Remote Setting- Home Office    Consent:  The patient/guardian has verbally consented to: the potential risks and benefits of telemedicine (video visit) versus in person care; bill my insurance or make self-payment for services provided; and responsibility for payment of non-covered services.     Patient would like the video invitation sent by:  My Chart    Mode of Communication:  Video Conference via Medical Zoom    As the provider I attest to compliance with applicable laws and regulations related to telemedicine.            Patient Participation / Response:  Moderately  participated, sharing some personal reflections / insights and adequately adequately received / provided feedback with other participants.    Demonstrated understanding of topics discussed through group discussion and participation    Treatment Plan:  Patient has a current master individualized treatment plan.  See Epic treatment plan for more information.    Lana Botello LGSW

## 2021-12-23 NOTE — GROUP NOTE
Process Group Note    PATIENT'S NAME: Dash Arizmendi  MRN:   [id number]  :   1965  ACCT. NUMBER: [id number]  DATE OF SERVICE: 21  START TIME:  9:00 AM  END TIME:  9:50 AM  FACILITATOR: Lana Botello LGSW  TOPIC:  Process Group    Diagnoses:  296.89 Bipolar II Disorder Depressed.  296.32 (F33.1) Major Depressive Disorder, Recurrent Episode, Moderate _  300.02 (F41.1) Generalized Anxiety Disorder.       [facility] [place] 55+ [facility]  TRACK: A2    NUMBER OF PARTICIPANTS: 9                                      Service Modality:  Video Visit     Telemedicine Visit: The patient's condition can be safely assessed and treated via synchronous audio and visual telemedicine encounter.      Reason for Telemedicine Visit: Services only offered telehealth    Originating Site (Patient Location): Patient's home    Distant Site (Provider Location): Provider Remote Setting- Home Office    Consent:  The patient/guardian has verbally consented to: the potential risks and benefits of telemedicine (video visit) versus in person care; bill my insurance or make self-payment for services provided; and responsibility for payment of non-covered services.     Patient would like the video invitation sent by:  My Chart    Mode of Communication:  Video Conference via Medical Zoom    As the provider I attest to compliance with applicable laws and regulations related to telemedicine.                Data:    Session content: At the start of this group, patients were invited to check in by identifying themselves, describing their current emotional status, and identifying issues to address in this group.   Area(s) of treatment focus addressed in this session included Symptom Management, Personal Safety and Community Resources/Discharge Planning.  Client reported lighting a woodwick candle as a self-sooth strategy.  He reports feeling overwhelmed today due to the following tasks he needs to complete: go grocery shopping, fix a broken tail  light and calling the clinic again to schedule his first Ketamine injection.  Client expressed difficulty with using mindfulness to break down the tasks.  Client reported feeling the urge to nap instead.  Client explained that he tried calling the Ketamine clinic but avoided leaving a voicemail. Client denied SI, SIB and chemical use.    Therapeutic Interventions/Treatment Strategies:  Psychotherapist offered support, feedback and validation and reinforced use of skills. Treatment modalities used include Cognitive Behavioral Therapy. Interventions include Behavioral Activation: Encouraged strategies to reduce individual procrastination and increase motivation by increasing goal-directed activities to enhance mood and reduce symptoms. and Coping Skills: Discussed use of self-soothe skills to decrease distress in the body.    Assessment:    Patient response:   Patient responded to session by accepting feedback, giving feedback and listening    Possible barriers to participation / learning include: and no barriers identified    Health Issues:   None reported       Substance Use Review:   Substance Use: No active concerns identified.    Mental Status/Behavioral Observations  Appearance:   Appropriate   Eye Contact:   Good   Psychomotor Behavior: Normal   Attitude:   Cooperative  Interested Friendly Pleasant  Orientation:   All  Speech   Rate / Production: Normal    Volume:  Normal   Mood:    Anxious  Depressed   Affect:    Appropriate   Thought Content:   Clear and Safety denies any current safety concerns including suicidal ideation, self-harm, and homicidal ideation  Thought Form:  Coherent  Logical     Insight:    Good     Plan:     Safety Plan: No current safety concerns identified.  Recommended that patient call 911 or go to the local ED should there be a change in any of these risk factors.     Barriers to treatment: None identified    Patient Contracts (see media tab):  None    Substance Use: Not addressed in  session     Continue or Discharge: Patient will continue in 55+ Program (55+) as planned. Patient is likely to benefit from learning and using skills as they work toward the goals identified in their treatment plan.      ERMELINDA Spencer  December 23, 2021

## 2021-12-23 NOTE — GROUP NOTE
Service Modality:  Video Visit     Telemedicine Visit: The patient's condition can be safely assessed and treated via synchronous audio and visual telemedicine encounter.      Reason for Telemedicine Visit: Services only offered telehealth    Originating Site (Patient Location): Patient's home    Distant Site (Provider Location): Provider Remote Setting- Home Office    Consent:  The patient/guardian has verbally consented to: the potential risks and benefits of telemedicine (video visit) versus in person care; bill my insurance or make self-payment for services provided; and responsibility for payment of non-covered services.     Patient would like the video invitation sent by:  My Chart    Mode of Communication:  Video Conference via Medical Zoom    As the provider I attest to compliance with applicable laws and regulations related to telemedicine.      Psychotherapy Group Note    PATIENT'S NAME: Dash Arizmendi  MRN:   [id number]  :   1965  ACCT. NUMBER: [id number]  DATE OF SERVICE: 21  START TIME: 11:00 AM  END TIME: 11:50 AM  FACILITATOR: Concepcion Coto LMFT  TOPIC:  EBP Group: [facility] 55+ Program  TRACK: A2    NUMBER OF PARTICIPANTS:8    Summary of Group / Topics Discussed:  Mindfulness: Mindfulness Skills: Patients received information on the main components of mindfulness. Patients participated in discussion on how to practice observing, describing, and participating in internal and external environments. Relevance of mindfulness skills to overall mental and physical health was explored.  Patients explored and discussed in group their current awareness and knowledge of mindfulness skills as well as barriers to applying skills.    Patient Session Goals / Objectives:    Demonstrated and verbalized understanding of key mindfulness concepts    Identified when/how to use mindfulness skills    Resolved barriers to practicing mindfulness  skills    Identified plan to use mindfulness skills in daily life       Patient Participation / Response:  Fully participated with the group by sharing personal reflections / insights and openly received / provided feedback with other participants.    Demonstrated understanding of topics discussed through group discussion and participation and Demonstrated understanding of mindfulness skills and benefits of practice    Treatment Plan:  Patient has a current master individualized treatment plan.  See Epic treatment plan for more information.    Concepcion Coto LMFT

## 2021-12-27 ENCOUNTER — HOSPITAL ENCOUNTER (OUTPATIENT)
Dept: BEHAVIORAL HEALTH | Facility: CLINIC | Age: 56
End: 2021-12-27
Attending: PSYCHIATRY & NEUROLOGY
Payer: COMMERCIAL

## 2021-12-27 PROCEDURE — 90853 GROUP PSYCHOTHERAPY: CPT | Mod: GT,95

## 2021-12-27 PROCEDURE — 90853 GROUP PSYCHOTHERAPY: CPT | Mod: GT,95 | Performed by: COUNSELOR

## 2021-12-27 NOTE — GROUP NOTE
Psychotherapy Group Note    PATIENT'S NAME: Dash Arizmendi  MRN:   [id number]  :   1965  ACCT. NUMBER: [id number]  DATE OF SERVICE: 21  START TIME: 11:00 AM  END TIME: 11:50 AM  FACILITATOR: Shelly Scanlon LPCC  TOPIC:  EBP Group: Behavioral Activation  [facility] 55+ Program  TRACK: A2    NUMBER OF PARTICIPANTS: 8    Summary of Group / Topics Discussed:  Behavioral Activation: Motivation and Procrastination: Patients explored how they currently spend their time, identifying thoughts and feelings that are motivating and serve to increase desired behaviors.  They also examined behaviors that contribute to procrastination.  Different types of procrastination behaviors were identified, and strategies to reduce individual procrastination and increase motivation were explored and practiced.  Patients identified ways to increase goal-directed activities to enhance mood and reduce symptoms.        Patient Session Goals / Objectives:    Identify current patterns of procrastination behavior and how they influence thoughts and moods, and inhibit motivation.    Identify behaviors that can be implemented that contribute to improving thoughts and feelings, motivation, and reduce symptoms.    Identify and develop a plan to increase activities that promote a sense of accomplishment and competence.    Practice scheduling positive activities / behaviors into daily routines.                                      Service Modality:  Video Visit     Telemedicine Visit: The patient's condition can be safely assessed and treated via synchronous audio and visual telemedicine encounter.      Reason for Telemedicine Visit: Services only offered telehealth    Originating Site (Patient Location): Patient's home    Distant Site (Provider Location): Provider Remote Setting- Home Office    Consent:  The patient/guardian has verbally consented to: the potential risks and benefits of telemedicine (video visit) versus in  person care; bill my insurance or make self-payment for services provided; and responsibility for payment of non-covered services.     Patient would like the video invitation sent by:  My Chart    Mode of Communication:  Video Conference via Medical Zoom    As the provider I attest to compliance with applicable laws and regulations related to telemedicine.            Patient Participation / Response:  Fully participated with the group by sharing personal reflections / insights and openly received / provided feedback with other participants.    Demonstrated understanding of topics discussed through group discussion and participation, Expressed understanding of the relationship between behaviors, thoughts, and feelings and Shared experiences and challenges with making behavioral changes    Treatment Plan:  Patient has a current master individualized treatment plan.  See Epic treatment plan for more information.    Shelly Scanlon, [facility]

## 2021-12-27 NOTE — GROUP NOTE
Psychotherapy Group Note    PATIENT'S NAME: Dash Arizmendi  MRN:   [id number]  :   1965  ACCT. NUMBER: [id number]  DATE OF SERVICE: 21  START TIME: 10:00 AM  END TIME: 10:50 AM  FACILITATOR: Lana Botello LGSW  TOPIC:  EBP Group: Specialty Awareness  [facility] 55+ Program  TRACK: A2    NUMBER OF PARTICIPANTS: 9    Summary of Group / Topics Discussed:  Specialty Topics: Grief/Transitions: Patients received an overview of the grief process.  Patients explored their relationship to loss and how that has affected their mental health symptoms.  Strategies for recognizing loss and ideas for engaging in the grief process was presented and discussed.  Patients identified needs for support and coping skills to manage loss.  The purpose of this specialty topic is to help patients identify the aspects of change due to loss that individuals experience in addition to mental health symptoms to better cope with the grief, loss, and life transitions.      Patient Session Goals / Objectives:    Identified grief, loss, and life transitions     Discussed how grief impacts mental health symptoms and disrupts usual functioning    Identified needs for support and coping with grief and planned further action for coping      Patient Participation / Response:  Fully participated with the group by sharing personal reflections / insights and openly received / provided feedback with other participants.    Demonstrated understanding of topics discussed through group discussion and participation and Identified / Expressed readiness to act on skill suggestions discussed in topic    Treatment Plan:  Patient has a current master individualized treatment plan.  See Epic treatment plan for more information.    ERMELINDA Spencer

## 2021-12-27 NOTE — GROUP NOTE
"Process Group Note    PATIENT'S NAME: Dash Arizmendi  MRN:   [id number]  :   1965  ACCT. NUMBER: [id number]  DATE OF SERVICE: 21  START TIME:  9:00 AM  END TIME:  9:50 AM  FACILITATOR: Lana Botello LGSW  TOPIC:  Process Group    Diagnoses:  296.89 Bipolar II Disorder Depressed.  296.32 (F33.1) Major Depressive Disorder, Recurrent Episode, Moderate _  300.02 (F41.1) Generalized Anxiety Disorder.       [facility] [place] 55+ Program  TRACK: A2    NUMBER OF PARTICIPANTS: 9                                      Service Modality:  Video Visit     Telemedicine Visit: The patient's condition can be safely assessed and treated via synchronous audio and visual telemedicine encounter.      Reason for Telemedicine Visit: Services only offered telehealth    Originating Site (Patient Location): Patient's home    Distant Site (Provider Location): Provider Remote Setting- Home Office    Consent:  The patient/guardian has verbally consented to: the potential risks and benefits of telemedicine (video visit) versus in person care; bill my insurance or make self-payment for services provided; and responsibility for payment of non-covered services.     Patient would like the video invitation sent by:  My Chart    Mode of Communication:  Video Conference via Medical Zoom    As the provider I attest to compliance with applicable laws and regulations related to telemedicine.                Data:    Session content: At the start of this group, patients were invited to check in by identifying themselves, describing their current emotional status, and identifying issues to address in this group.   Area(s) of treatment focus addressed in this session included Symptom Management, Personal Safety and Community Resources/Discharge Planning.  Client reported having a \"mild\" Jean that included watching a movie with his wife, adult child and dog.  He reports plans to talk with his wife about a gathering in January with his wife's " "family.  He hopes to have all family members tested as he is \"anxious\" about the new COVID variant.  Client attempted to work on his work project over the weekend but fear and anxiety caused increased immobilization.  \"I couldn't get myself to open my laptop\".  He explained having fear of \"not being able to do it\".  Client reported depressive symptoms this weekend, including irritability and \"crankiness\".  He reported the ability to \"stop and pause\" to notice his depressive symptoms.  He plans on calling the [facility] clinic today to set up a first Ketamine appointment.   Peers offered supportive feedback and validation.  Client did not report any suicidal ideation, plan or intent.    Therapeutic Interventions/Treatment Strategies:  Psychotherapist offered support, feedback and validation and reinforced use of skills. Treatment modalities used include Cognitive Behavioral Therapy. Interventions include Mindfulness: Talked about increased self-awareness related to depression and Emotions Management:  Increased awareness of daily mood patterns/changes.    Assessment:    Patient response:   Patient responded to session by accepting feedback and listening    Possible barriers to participation / learning include: and no barriers identified    Health Issues:   None reported       Substance Use Review:   Substance Use: No active concerns identified.    Mental Status/Behavioral Observations  Appearance:   Appropriate   Eye Contact:   Good   Psychomotor Behavior: Normal   Attitude:   Cooperative  Interested Pleasant  Orientation:   All  Speech   Rate / Production: Normal    Volume:  Normal   Mood:    Anxious  Depressed   Affect:    Appropriate   Thought Content:   Clear and Safety denies any current safety concerns including suicidal ideation, self-harm, and homicidal ideation  Thought Form:  Coherent  Logical     Insight:    Good     Plan:     Safety Plan: No current safety concerns identified.  Recommended that patient call " 911 or go to the local ED should there be a change in any of these risk factors.     Barriers to treatment: None identified    Patient Contracts (see media tab):  None    Substance Use: Not addressed in session     Continue or Discharge: Patient will continue in 55+ Program (55+) as planned. Patient is likely to benefit from learning and using skills as they work toward the goals identified in their treatment plan.      ERMELINDA Spencer  December 27, 2021

## 2021-12-29 ENCOUNTER — HOSPITAL ENCOUNTER (OUTPATIENT)
Dept: BEHAVIORAL HEALTH | Facility: CLINIC | Age: 56
End: 2021-12-29
Attending: PSYCHIATRY & NEUROLOGY
Payer: COMMERCIAL

## 2021-12-29 PROCEDURE — 90853 GROUP PSYCHOTHERAPY: CPT | Mod: GT,95

## 2021-12-29 PROCEDURE — 90853 GROUP PSYCHOTHERAPY: CPT | Mod: GT,95 | Performed by: COUNSELOR

## 2021-12-29 NOTE — GROUP NOTE
Psychoeducation Group Note    PATIENT'S NAME: Dash Arizmendi  MRN:   [id number]  :   1965  ACCT. NUMBER: [id number]  DATE OF SERVICE: 21  START TIME: 10:00 AM  END TIME: 10:50 AM  FACILITATOR: Lana Botello LGSW  TOPIC: CHUCKY RN Group: Mental Health Maintenance  [facility] 55+ Program  TRACK: A2    NUMBER OF PARTICIPANTS: 8    Summary of Group / Topics Discussed:  Mental Health Maintenance:  Vulnerability: In this group, the concept of vulnerability was explored through the viewing, discussion, and self-reflection of the Yazmin Kohli [facility] Sou Session titled ,  The Anatomy of Trust.      Patient Session Goals / Objectives:  ? Defined and described the components of trust  ? Identified 2 or more ways of practicing authenticity                                         Service Modality:  Video Visit     Telemedicine Visit: The patient's condition can be safely assessed and treated via synchronous audio and visual telemedicine encounter.      Reason for Telemedicine Visit: Services only offered telehealth    Originating Site (Patient Location): Patient's home    Distant Site (Provider Location): Provider Remote Setting- Home Office    Consent:  The patient/guardian has verbally consented to: the potential risks and benefits of telemedicine (video visit) versus in person care; bill my insurance or make self-payment for services provided; and responsibility for payment of non-covered services.     Patient would like the video invitation sent by:  My Chart    Mode of Communication:  Video Conference via Medical Zoom    As the provider I attest to compliance with applicable laws and regulations related to telemedicine.              Patient Participation / Response:  Fully participated with the group by sharing personal reflections / insights and openly received / provided feedback with other participants.    Demonstrated understanding of topics discussed through group discussion and participation and  Verbalized understanding of mental health maintenance topic    Treatment Plan:  Patient has a current master individualized treatment plan.  See Epic treatment plan for more information.    Lana Botello LGSW

## 2021-12-29 NOTE — GROUP NOTE
Psychotherapy Group Note    PATIENT'S NAME: Dash Arizmendi  MRN:   [id number]  :   1965  ACCT. NUMBER: [id number]  DATE OF SERVICE: 21  START TIME: 11:00 AM  END TIME: 11:50 AM  FACILITATOR: Shelly Scanlon LPCC  TOPIC: MH EBP Group: Relationship Skills  [facility] 55+ Program  TRACK: A-2    NUMBER OF PARTICIPANTS: 8    Summary of Group / Topics Discussed:  Relationship Skills: Relationship Mapping: Patients identified different types of relationships they have in their life and examined if there is conflict or closeness, the degree of conflict or closeness, and the reason for conflict. The goal of this topic is to help patients gain awareness of the relationships they have with others, identify types of conflict in patients  lives and how they impact symptoms/functioning, and identify how they can improve relationships with relationship and interpersonal skills they have learned.     Patient Session Goals / Objectives:    Familiarized patients with awareness to the different types of relationships they may have with different people and substances in their lives    Discussed and practiced strategies to promote healthier understanding of interpersonal relationships with a focus on awareness of conflict, the causes of conflict, and the ways to transform conflict    Discussed the use of substances and its impact on their relationships                                      Service Modality:  Video Visit     Telemedicine Visit: The patient's condition can be safely assessed and treated via synchronous audio and visual telemedicine encounter.      Reason for Telemedicine Visit: Services only offered telehealth    Originating Site (Patient Location): Patient's home    Distant Site (Provider Location): Provider Remote Setting- Home Office    Consent:  The patient/guardian has verbally consented to: the potential risks and benefits of telemedicine (video visit) versus in person care; bill my insurance  or make self-payment for services provided; and responsibility for payment of non-covered services.     Patient would like the video invitation sent by:  My Chart    Mode of Communication:  Video Conference via Medical Zoom    As the provider I attest to compliance with applicable laws and regulations related to telemedicine.            Patient Participation / Response:  Fully participated with the group by sharing personal reflections / insights and openly received / provided feedback with other participants.    Demonstrated understanding of topics discussed through group discussion and participation, Demonstrated understanding of relationship skills and communication skills and Identified / Expressed personal readiness to incorporate effective communication skills    Treatment Plan:  Patient has a current master individualized treatment plan.  See Epic treatment plan for more information.    Shelly Scanlon, [facility]

## 2021-12-29 NOTE — GROUP NOTE
Process Group Note    PATIENT'S NAME: Dash Arizmendi  MRN:   [id number]  :   1965  ACCT. NUMBER: [id number]  DATE OF SERVICE: 21  START TIME:  9:00 AM  END TIME:  9:50 AM  FACILITATOR: Lana Botello LGSW  TOPIC:  Process Group    Diagnoses:  296.89 Bipolar II Disorder Depressed.  296.32 (F33.1) Major Depressive Disorder, Recurrent Episode, Moderate _  300.02 (F41.1) Generalized Anxiety Disorder.       [employer] [facility] 55+ Program  TRACK: A2    NUMBER OF PARTICIPANTS: 8                                      Service Modality:  Video Visit     Telemedicine Visit: The patient's condition can be safely assessed and treated via synchronous audio and visual telemedicine encounter.      Reason for Telemedicine Visit: Services only offered telehealth    Originating Site (Patient Location): Patient's home    Distant Site (Provider Location): Provider Remote Setting- Home Office    Consent:  The patient/guardian has verbally consented to: the potential risks and benefits of telemedicine (video visit) versus in person care; bill my insurance or make self-payment for services provided; and responsibility for payment of non-covered services.     Patient would like the video invitation sent by:  My Chart    Mode of Communication:  Video Conference via Medical Zoom    As the provider I attest to compliance with applicable laws and regulations related to telemedicine.                Data:    Session content: At the start of this group, patients were invited to check in by identifying themselves, describing their current emotional status, and identifying issues to address in this group.   Area(s) of treatment focus addressed in this session included Symptom Management, Personal Safety and Community Resources/Discharge Planning.  Client asked for clarification on his discharge date and said he only had MyChart appointments until 2022.  Writer agreed to request one more MyChart appointment as his discharge date was  "scheduled for 1/6/2022.  Client reflected on his individual therapy appointment yesterday where he processed \"dark thoughts\" about \"passing\".  Writer assessed for current safety concerns.  Client committed to safety despite having current passive S/I.  Client reports feeling \"scattered\" as a result of his individual therapy appointment.  He reports using the skill of distraction by watching a movie to manage intrusive thoughts.  Client scheduled an assessment appointment at the [facility] for his Ketamine injections.  Client walked his dog this morning.  He reflected on his group therapy, individual therapy and medications as an \"every day reminder of what I am going through\".  Client asked for feedback from the group on how to give himself a \"break\" from those reminders.  Peers offered supportive feedback and validation.  Writer encouraged client to reframe as reminders of the \"good work\" he has been putting towards his mental health.  Writer reminded client that he was discharging from group next week and asked if he was interested in joining the Clinic waitlist.  Client requested to think about it and get back to writer.     Therapeutic Interventions/Treatment Strategies:  Psychotherapist offered support, feedback and validation and reinforced use of skills. Treatment modalities used include Cognitive Behavioral Therapy. Interventions include Cognitive Restructuring:  Assisted patient in formulating new neutral/positive alternatives to challenge less helpful / ineffective thoughts and Coping Skills: Assisted patient in identifying 1-2 healthy distraction skills to reduce overall distress.    Assessment:    Patient response:   Patient responded to session by accepting feedback and listening    Possible barriers to participation / learning include: and no barriers identified    Health Issues:   None reported       Substance Use Review:   Substance Use: No active concerns identified.    Mental Status/Behavioral " Observations  Appearance:   Appropriate   Eye Contact:   Good   Psychomotor Behavior: Normal   Attitude:   Cooperative  Interested Friendly Pleasant  Orientation:   All  Speech   Rate / Production: Normal    Volume:  Normal   Mood:    Depressed   Affect:    Appropriate   Thought Content:   Clear and Safety reports  presence of suicidal ideation passive suicidal ideation   Thought Form:  Coherent  Logical     Insight:    Good     Plan:     Safety Plan: Recommended that patient call 911 or go to the local ED should there be a change in any of these risk factors.    No current safety concerns identified.  Recommended that patient call 911 or go to the local ED should there be a change in any of these risk factors.     Barriers to treatment: None identified    Patient Contracts (see media tab):  None    Substance Use: Not addressed in session     Continue or Discharge: Patient will continue in 55+ Program (55+) as planned. Patient is likely to benefit from learning and using skills as they work toward the goals identified in their treatment plan.      ERMELINDA Spencer  December 29, 2021

## 2021-12-30 ENCOUNTER — HOSPITAL ENCOUNTER (OUTPATIENT)
Dept: BEHAVIORAL HEALTH | Facility: CLINIC | Age: 56
End: 2021-12-30
Attending: PSYCHIATRY & NEUROLOGY
Payer: COMMERCIAL

## 2021-12-30 PROCEDURE — 90853 GROUP PSYCHOTHERAPY: CPT | Mod: GT,95 | Performed by: COUNSELOR

## 2021-12-30 PROCEDURE — 90853 GROUP PSYCHOTHERAPY: CPT | Mod: GT,95

## 2021-12-30 NOTE — GROUP NOTE
Psychoeducation Group Note    PATIENT'S NAME: Dash Arizmendi  MRN:   [id number]  :   1965  ACCT. NUMBER: [id number]  DATE OF SERVICE: 21  START TIME: 11:00 AM  END TIME: 11:50 AM  FACILITATOR: Shelly Scanlon LPCC  TOPIC: MH RN Group: Health Maintenance  [facility] 55+ Program  TRACK: A2    NUMBER OF PARTICIPANTS: 7    Summary of Group / Topics Discussed:  Health Maintenance: Weekend planning: Patients were given time to complete a weekend plan of what they will do to promote wellness and sobriety over the weekend when they do not have the structure of group. Patients were encouraged to review progress on their treatment goals and were challenged to identify ways to work toward meeting them. Patients identified and discussed foreseeable barriers to success over the weekend and then developed a plan to overcome them. Patients reviewed their distress coping skills and social support network and discussed this with the group.       Patient Session Goals / Objectives:    ?    Identified activities to engage in that promote balance in wellness  ?    Distinguished possible barriers to success over the weekend and created a plan to overcome them  ?    Listed distress coping skills and identified social support network to utilize if in crisis during the weekend                                        Service Modality:  Video Visit     Telemedicine Visit: The patient's condition can be safely assessed and treated via synchronous audio and visual telemedicine encounter.      Reason for Telemedicine Visit: Services only offered telehealth    Originating Site (Patient Location): Patient's home    Distant Site (Provider Location): Provider Remote Setting- Home Office    Consent:  The patient/guardian has verbally consented to: the potential risks and benefits of telemedicine (video visit) versus in person care; bill my insurance or make self-payment for services provided; and responsibility for payment of  non-covered services.     Patient would like the video invitation sent by:  My Chart    Mode of Communication:  Video Conference via Medical Zoom    As the provider I attest to compliance with applicable laws and regulations related to telemedicine.            Patient Participation / Response:  Fully participated with the group by sharing personal reflections / insights and openly received / provided feedback with other participants.    Demonstrated understanding of topics discussed through group discussion and participation and Identified / Expressed personal readiness to practice skills    Treatment Plan:  Patient has a current master individualized treatment plan.  See Epic treatment plan for more information.    Shelly Scanlon, ANUSHAC

## 2021-12-30 NOTE — GROUP NOTE
Psychoeducation Group Note    PATIENT'S NAME: Dash Arizmendi  MRN:   [id number]  :   1965  ACCT. NUMBER: [id number]  DATE OF SERVICE: 21  START TIME: 10:00 AM  END TIME: 10:50 AM  FACILITATOR: Lana Botello LGSW  TOPIC: CHUCKY RN Group: Mental Health Maintenance  [facility] 55+ Program  TRACK: A2    NUMBER OF PARTICIPANTS: 8    Summary of Group / Topics Discussed:  Mental Health Maintenance:  Trust: In this group, the concept of vulnerability was explored through the viewing, discussion, and self-reflection of the Yazmin Kohli [employer] Session titled  The Anatomy of Trust.      Patient Session Goals / Objectives:  ? Defined and described definition of trust through the acronym BRAVING  ? Identified 2 or more ways of practicing authenticity                                       Service Modality:  Video Visit     Telemedicine Visit: The patient's condition can be safely assessed and treated via synchronous audio and visual telemedicine encounter.      Reason for Telemedicine Visit: Services only offered telehealth    Originating Site (Patient Location): Patient's home    Distant Site (Provider Location): Provider Remote Setting- Home Office    Consent:  The patient/guardian has verbally consented to: the potential risks and benefits of telemedicine (video visit) versus in person care; bill my insurance or make self-payment for services provided; and responsibility for payment of non-covered services.     Patient would like the video invitation sent by:  My Chart    Mode of Communication:  Video Conference via Medical Zoom    As the provider I attest to compliance with applicable laws and regulations related to telemedicine.              Patient Participation / Response:  Fully participated with the group by sharing personal reflections / insights and openly received / provided feedback with other participants.    Demonstrated understanding of topics discussed through group discussion and participation  and Verbalized understanding of mental health maintenance topic    Treatment Plan:  Patient has a current master individualized treatment plan.  See Epic treatment plan for more information.    Lana Botello LGSW

## 2021-12-30 NOTE — GROUP NOTE
Process Group Note    PATIENT'S NAME: Dash Arizmendi  MRN:   [id number]  :   1965  ACCT. NUMBER: [id number]  DATE OF SERVICE: 21  START TIME:  9:00 AM  END TIME:  9:50 AM  FACILITATOR: Lana Botello LGSW  TOPIC:  Process Group    Diagnoses:  296.89 Bipolar II Disorder Depressed.  296.32 (F33.1) Major Depressive Disorder, Recurrent Episode, Moderate _  300.02 (F41.1) Generalized Anxiety Disorder.      [place] [address]+ Program  TRACK: A2    NUMBER OF PARTICIPANTS: 8                                      Service Modality:  Video Visit     Telemedicine Visit: The patient's condition can be safely assessed and treated via synchronous audio and visual telemedicine encounter.      Reason for Telemedicine Visit: Services only offered telehealth    Originating Site (Patient Location): Patient's home    Distant Site (Provider Location): Provider Remote Setting- Home Office    Consent:  The patient/guardian has verbally consented to: the potential risks and benefits of telemedicine (video visit) versus in person care; bill my insurance or make self-payment for services provided; and responsibility for payment of non-covered services.     Patient would like the video invitation sent by:  My Chart    Mode of Communication:  Video Conference via Medical Zoom    As the provider I attest to compliance with applicable laws and regulations related to telemedicine.                Data:    Session content: At the start of this group, patients were invited to check in by identifying themselves, describing their current emotional status, and identifying issues to address in this group.   Area(s) of treatment focus addressed in this session included Symptom Management, Personal Safety and Community Resources/Discharge Planning.  Client reported talking with his wife and agreeing to participate in the 55+ aftercare clinic.  Client requested that writer put his name on the waitlist.  Writer agreed to do this.  He reported  "continuing to ruminate and experience passive suicidal ideation.  \"I am still dealing with ruminating over dark thoughts\".  Client used the skill of distraction by walking his dog, shoveling the snow and starting a movie to minimize the intensity of his negative thoughts yesterday.      Therapeutic Interventions/Treatment Strategies:  Psychotherapist offered support, feedback and validation and reinforced use of skills. Treatment modalities used include Cognitive Behavioral Therapy. Interventions include Coping Skills: Assisted patient in identifying 1-2 healthy distraction skills to reduce overall distress.    Assessment:    Patient response:   Patient responded to session by accepting feedback, giving feedback and listening    Possible barriers to participation / learning include: and no barriers identified    Health Issues:   None reported       Substance Use Review:   Substance Use: No active concerns identified.    Mental Status/Behavioral Observations  Appearance:   Appropriate   Eye Contact:   Good   Psychomotor Behavior: Normal   Attitude:   Cooperative  Interested Friendly Pleasant  Orientation:   All  Speech   Rate / Production: Normal    Volume:  Normal   Mood:    Depressed   Affect:    Appropriate   Thought Content:   Clear, Rumination and Safety reports  presence of suicidal ideation passive suicidal ideation   Thought Form:  Coherent  Logical     Insight:    Good     Plan:     Safety Plan: Recommended that patient call 911 or go to the local ED should there be a change in any of these risk factors.    No current safety concerns identified.  Recommended that patient call 911 or go to the local ED should there be a change in any of these risk factors.     Barriers to treatment: None identified    Patient Contracts (see media tab):  None    Substance Use: Not addressed in session     Continue or Discharge: Patient will continue in 55+ Program (55+) as planned. Patient is likely to benefit from learning " and using skills as they work toward the goals identified in their treatment plan.      Lana Botello, ERMELINDA  December 30, 2021

## 2022-01-03 ENCOUNTER — HOSPITAL ENCOUNTER (OUTPATIENT)
Dept: BEHAVIORAL HEALTH | Facility: CLINIC | Age: 57
End: 2022-01-03
Attending: PSYCHIATRY & NEUROLOGY
Payer: COMMERCIAL

## 2022-01-03 PROCEDURE — 90853 GROUP PSYCHOTHERAPY: CPT | Mod: GT,95

## 2022-01-03 NOTE — GROUP NOTE
Psychotherapy Group Note    PATIENT'S NAME: Dash Arizmendi  MRN:   [id number]  :   1965  ACCT. NUMBER: [id number]  DATE OF SERVICE: 22  START TIME: 10:00 AM  END TIME: 10:50 AM  FACILITATOR: Lana Botello LGSW  TOPIC: MH EBP Group: Coping Skills  [facility] 55+ Program  TRACK: A2    NUMBER OF PARTICIPANTS: 7    Summary of Group / Topics Discussed:  Coping Skills: Radical Acceptance: Patients learned radical acceptance as a way to tolerate heightened stress in the moment.  Patients identified situations that necessitate radical acceptance.  They focused on applying acceptance of the moment to tolerate difficult emotions and events. Patients discussed how to distinguish when this can be useful in their lives and when other skills are more relevant or helpful.    Patient Session Goals / Objectives:    Discussed Distress Tolerance Handout 13 - Willingness    Understand that some amount of pain exists for each of us in our lives    Process the difficulty of acceptance in our lives and benefits of radical acceptance to mood and functioning.    Demonstrate understanding of when to use the radical acceptance to tolerate distress by providing examples of situations where this could be applied.    Identify barriers to applying radical acceptance to difficult situations and explore strategies to overcome them        Patient Participation / Response:  Moderately participated, sharing some personal reflections / insights and adequately adequately received / provided feedback with other participants.    Demonstrated understanding of topics discussed through group discussion and participation    Treatment Plan:  Patient has a current master individualized treatment plan.  See Epic treatment plan for more information.    ERMELINDA Spencer

## 2022-01-03 NOTE — GROUP NOTE
Psychotherapy Group Note    PATIENT'S NAME: Dash Arizmendi  MRN:   [id number]  :   1965  ACCT. NUMBER: [id number]  DATE OF SERVICE: 22  START TIME: 11:00 AM  END TIME: 11:50 AM  FACILITATOR: Mary Anne Oslon LICSW  TOPIC: MH EBP Group: Coping Skills  [facility] 55+ Program  TRACK: A2                              Service Modality:  Video Visit     Telemedicine Visit: The patient's condition can be safely assessed and treated via synchronous audio and visual telemedicine encounter.      Reason for Telemedicine Visit: Services only offered telehealth    Originating Site (Patient Location): Patient's home    Distant Site (Provider Location): [facility]    Consent:  The patient/guardian has verbally consented to: the potential risks and benefits of telemedicine (video visit) versus in person care; bill my insurance or make self-payment for services provided; and responsibility for payment of non-covered services.     Patient would like the video invitation sent by:  My Chart    Mode of Communication:  Video Conference via Medical Zoom    As the provider I attest to compliance with applicable laws and regulations related to telemedicine.         NUMBER OF PARTICIPANTS: 7    Summary of Group / Topics Discussed:  Coping Skills: Additional Coping Skills:  Patients discussed and practiced TIP skills.  Reviewed the benefits of applying the aforementioned coping strategies.  Patients explored how these strategies might be applied to daily stressors or distressing situations.    Patient Session Goals / Objectives:    Understand the purpose and benefits of applying TIP coping strategies    Address barriers to utilizing coping skills when in distress.        Patient Participation / Response:  Fully participated with the group by sharing personal reflections / insights and openly received / provided feedback with other participants.    Demonstrated understanding of topics  discussed through group discussion and participation and Expressed understanding of the relevance / importance of coping skills at distressing times in life    Treatment Plan:  Patient has a current master individualized treatment plan.  See Epic treatment plan for more information.    MARCOS LorenzoSW

## 2022-01-03 NOTE — GROUP NOTE
Process Group Note    PATIENT'S NAME: Dash Arizmendi  MRN:   [id number]  :   1965  ACCT. NUMBER: [id number]  DATE OF SERVICE: 22  START TIME:  9:00 AM  END TIME:  9:50 AM  FACILITATOR: Lana Botello LGSW  TOPIC:  Process Group    Diagnoses:  296.89 Bipolar II Disorder Depressed.  296.32 (F33.1) Major Depressive Disorder, Recurrent Episode, Moderate _  300.02 (F41.1) Generalized Anxiety Disorder.       [facility] 55+ Program  TRACK: A2    NUMBER OF PARTICIPANTS: 8                                      Service Modality:  Video Visit     Telemedicine Visit: The patient's condition can be safely assessed and treated via synchronous audio and visual telemedicine encounter.      Reason for Telemedicine Visit: Services only offered telehealth    Originating Site (Patient Location): Patient's home    Distant Site (Provider Location): Provider Remote Setting- Home Office    Consent:  The patient/guardian has verbally consented to: the potential risks and benefits of telemedicine (video visit) versus in person care; bill my insurance or make self-payment for services provided; and responsibility for payment of non-covered services.     Patient would like the video invitation sent by:  My Chart    Mode of Communication:  Video Conference via Medical Zoom    As the provider I attest to compliance with applicable laws and regulations related to telemedicine.                Data:    Session content: At the start of this group, patients were invited to check in by identifying themselves, describing their current emotional status, and identifying issues to address in this group.   Area(s) of treatment focus addressed in this session included Symptom Management, Personal Safety and Community Resources/Discharge Planning.  Client reported being active over the weekend by spending time outside and taking his dog on three walks per day.  Client also reported facilitating time for his dog to play with a neighbor's  "dog.  Client reported using activity as a way to distract from intrusive thoughts.  He also reported watching television with his wife.  Client noticed his pulling on his beard and cited this as a \"tell\" that he is experiencing anxiety.  Client was unsure about the source of his anxiety.  He reflected on completing his intake form for this Ketamine consult appointment at the [facility] on Monday 1/10/2022. He expressed frustration and difficulty with recalling his medications and the reasons for adjusting them.  \"I've had 15-20 of them.\"  He enlisted the support of his wife to look at his medical charts to find the relevant information to fill out his intake form.  He reported successfully submitting the intake form yesterday.  Client noticed his smart watch measuring his heart rate as lower since he had his medication change.  \"I felt good about that\".  Client did not report any suicidal ideation, plan or intent.    Therapeutic Interventions/Treatment Strategies:  Psychotherapist offered support, feedback and validation and reinforced use of skills. Treatment modalities used include Cognitive Behavioral Therapy. Interventions include Behavioral Activation: Explored how behaviors effect mood and interact with thoughts and feelings and Encouraged strategies to reduce individual procrastination and increase motivation by increasing goal-directed activities to enhance mood and reduce symptoms., Symptoms Management: Promoted understanding of their diagnoses and how it impacts their functioning and Emotions Management:  Increased awareness of daily mood patterns/changes.    Assessment:    Patient response:   Patient responded to session by accepting feedback and listening    Possible barriers to participation / learning include: and no barriers identified    Health Issues:   None reported       Substance Use Review:   Substance Use: No active concerns identified.    Mental Status/Behavioral " Observations  Appearance:   Appropriate   Eye Contact:   Good   Psychomotor Behavior: Normal   Attitude:   Cooperative  Interested Friendly Pleasant  Orientation:   All  Speech   Rate / Production: Normal    Volume:  Normal   Mood:    Anxious  Depressed   Affect:    Appropriate   Thought Content:   Clear and Safety denies any current safety concerns including suicidal ideation, self-harm, and homicidal ideation  Thought Form:  Coherent  Logical     Insight:    Good     Plan:     Safety Plan: No current safety concerns identified.  Recommended that patient call 911 or go to the local ED should there be a change in any of these risk factors.     Barriers to treatment: None identified    Patient Contracts (see media tab):  None    Substance Use: Not addressed in session     Continue or Discharge: Patient will continue in 55+ Program (55+) as planned. Patient is likely to benefit from learning and using skills as they work toward the goals identified in their treatment plan.      ERMELINDA Spencer  January 3, 2022

## 2022-01-05 ENCOUNTER — HOSPITAL ENCOUNTER (OUTPATIENT)
Dept: BEHAVIORAL HEALTH | Facility: CLINIC | Age: 57
End: 2022-01-05
Attending: PSYCHIATRY & NEUROLOGY
Payer: COMMERCIAL

## 2022-01-05 PROCEDURE — 90853 GROUP PSYCHOTHERAPY: CPT | Mod: GT,95

## 2022-01-05 PROCEDURE — 90853 GROUP PSYCHOTHERAPY: CPT | Mod: GT,95 | Performed by: SOCIAL WORKER

## 2022-01-05 NOTE — GROUP NOTE
Psychotherapy Group Note    PATIENT'S NAME: Dash Arizmendi  MRN:   [id number]  :   1965  ACCT. NUMBER: [id number]  DATE OF SERVICE: 22  START TIME: 10:00 AM  END TIME: 10:50 AM  FACILITATOR: Lana Botello LGSW  TOPIC:  EBP Group: Emotions Management  [facility] 55+ Program  TRACK: A2    NUMBER OF PARTICIPANTS: 8    Summary of Group / Topics Discussed:  Emotions Management: Guilt and Shame: Patients explored and shared personal experiences associated with feelings of guilt and shame.  Group explored how these feelings develop, what they mean to each individual, and how to increase acceptance and usefulness of these feelings.  Group members assisted one another to contextualize these concepts and promote healing.     Patient Session Goals / Objectives:    Discuss and review definitions and personal views/experiences with guilt and shame    Understand the differences between guilt and shame    Explore how feelings of guilt and shame impact functioning    Understand and practice strategies to manage difficult emotions and move towards healing    Understand and normalize difficult emotions through group discussion    Understand the utility of guilt and shame    Target  unwanted  emotions for change                                        Service Modality:  Video Visit     Telemedicine Visit: The patient's condition can be safely assessed and treated via synchronous audio and visual telemedicine encounter.      Reason for Telemedicine Visit: Services only offered telehealth    Originating Site (Patient Location): Patient's home    Distant Site (Provider Location): Provider Remote Setting- Home Office    Consent:  The patient/guardian has verbally consented to: the potential risks and benefits of telemedicine (video visit) versus in person care; bill my insurance or make self-payment for services provided; and responsibility for payment of non-covered services.     Patient would like the video  invitation sent by:  My Chart    Mode of Communication:  Video Conference via Medical Zoom    As the provider I attest to compliance with applicable laws and regulations related to telemedicine.            Patient Participation / Response:  Moderately participated, sharing some personal reflections / insights and adequately adequately received / provided feedback with other participants.    Demonstrated understanding of topics discussed through group discussion and participation and Self-aware of experiences with difficult emotions, and strategies to employ to manage them    Treatment Plan:  Patient has a current master individualized treatment plan.  See Epic treatment plan for more information.    Lana Botello LGSW

## 2022-01-05 NOTE — GROUP NOTE
"Process Group Note    PATIENT'S NAME: Dash Arizmendi  MRN:   [id number]  :   1965  ACCT. NUMBER: [id number]  DATE OF SERVICE: 22  START TIME:  9:00 AM  END TIME:  9:50 AM  FACILITATOR: Lana Botello LGSW  TOPIC:  Process Group    Diagnoses:  296.89 Bipolar II Disorder Depressed.  296.32 (F33.1) Major Depressive Disorder, Recurrent Episode, Moderate _  300.02 (F41.1) Generalized Anxiety Disorder.       [facility] 55+ [facility]  TRACK: A2    NUMBER OF PARTICIPANTS: 8                                      Service Modality:  Video Visit     Telemedicine Visit: The patient's condition can be safely assessed and treated via synchronous audio and visual telemedicine encounter.      Reason for Telemedicine Visit: Services only offered telehealth    Originating Site (Patient Location): Patient's home    Distant Site (Provider Location): Provider Remote Setting- Home Office    Consent:  The patient/guardian has verbally consented to: the potential risks and benefits of telemedicine (video visit) versus in person care; bill my insurance or make self-payment for services provided; and responsibility for payment of non-covered services.     Patient would like the video invitation sent by:  My Chart    Mode of Communication:  Video Conference via Medical Zoom    As the provider I attest to compliance with applicable laws and regulations related to telemedicine.                Data:    Session content: At the start of this group, patients were invited to check in by identifying themselves, describing their current emotional status, and identifying issues to address in this group.   Area(s) of treatment focus addressed in this session included Symptom Management, Personal Safety and Community Resources/Discharge Planning.  Client reported having an \"okay\" and \"unmemorable\" day yesterday.  Client reported attempting to to alleviate anxiety this morning by using adaptive coping skills.  Client reported these coping " "skills not working, causing him to engage in SIB to calm down.   \"I reached into my bad toolkit and got out the scissors\".  Client acknowledged the short term nature of relief that SIB provides.  Client reported working with his individual therapist and PCP to address self-harm triggers.  Client has no current urges to self-harm. He reports attending a Men's support group last night where his peers gave him positive feedback about their experiences with Ketamine injections.  Client feels \"not negative and not positive\" about discharging from group programming tomorrow.  He looks forward to starting Ketamine injections next week.  Client did not report any suicidal ideation, plan or intent.       Therapeutic Interventions/Treatment Strategies:  Psychotherapist offered support, feedback and validation and reinforced use of skills. Treatment modalities used include Cognitive Behavioral Therapy. Interventions include Coping Skills: Facilitated understanding of  what factors may contribute to symptom relapse and skills plan to manage symptom relapse  and Addressed barriers to utilizing coping skills when in distress.    Assessment:    Patient response:   Patient responded to session by accepting feedback, giving feedback and listening    Possible barriers to participation / learning include: and no barriers identified    Health Issues:   None reported       Substance Use Review:   Substance Use: No active concerns identified.    Mental Status/Behavioral Observations  Appearance:   Appropriate   Eye Contact:   Good   Psychomotor Behavior: Normal   Attitude:   Cooperative  Interested Pleasant  Orientation:   All  Speech   Rate / Production: Normal    Volume:  Normal   Mood:    Anxious   Affect:    Appropriate   Thought Content:   Clear and Safety denies any current safety concerns including suicidal ideation, self-harm, and homicidal ideation  Thought Form:  Coherent  Logical     Insight:    Good     Plan:     Safety Plan: " No current safety concerns identified.  Recommended that patient call 911 or go to the local ED should there be a change in any of these risk factors.     Barriers to treatment: None identified    Patient Contracts (see media tab):  None    Substance Use: Not addressed in session     Continue or Discharge: Patient will continue in 55+ Program (55+) as planned. Patient is likely to benefit from learning and using skills as they work toward the goals identified in their treatment plan.      ERMELINDA Spencer  January 5, 2022

## 2022-01-05 NOTE — GROUP NOTE
Service Modality:  Video Visit     Telemedicine Visit: The patient's condition can be safely assessed and treated via synchronous audio and visual telemedicine encounter.       Reason for Telemedicine Visit: Services only offered telehealth and due to COVID-19.     Originating Site (Patient Location): Patient's home     Distant Site (Provider Location): Provider Remote Setting- Home Office     Consent:  The patient/guardian has verbally consented to: the potential risks and benefits of telemedicine (video visit) versus in person care; bill my insurance or make self-payment for services provided; and responsibility for payment of non-covered services.      Patient would like the video invitation sent by:  My Chart     Mode of Communication:  Video Conference via Medical Zoom     As the provider I attest to compliance with applicable laws and regulations related to telemedicine.    Psychotherapy Group Note    PATIENT'S NAME: Dash Arizmendi  MRN:   [id number]  :   1965  ACCT. NUMBER: [id number]  DATE OF SERVICE: 22  START TIME: 11:00 AM  END TIME: 11:50 AM  FACILITATOR: Mariangel Patiño LICSW  TOPIC:  EBP Group: Emotions Management  [facility] 55+ Program  TRACK: A2    NUMBER OF PARTICIPANTS: 7    Summary of Group / Topics Discussed:  Emotions Management: Model of Emotions: Patients were introduced to the cyclical model of emotions.  Explored emotions are shaped by different events and one s interpretation of events.  Group discussed how emotions begin with an event, followed by one s interpretation, followed by associated feelings.  Discussion included a review of personal urges and actions that can/do follow an emotional experience in the patient s life, and the end results.    Patient Session Goals / Objectives:    Demonstrate understanding of types various emotions.    Identify and discuss specific emotions and when they occur; understand triggers.    Identify individual emotions and  physical sensations that accompany them.    Discuss urges and actions, and how to influence the intensity of emotional reactions and disrupt the cycle.      Discuss barriers to emotional regulation.    Choose 1-2 strategies to assist with emotional response to potentially distressing situations.      Patient Participation / Response:  Moderately participated, sharing some personal reflections / insights and adequately adequately received / provided feedback with other participants.    Demonstrated understanding of topics discussed through group discussion and participation and Self-aware of experiences with difficult emotions, and strategies to employ to manage them    Treatment Plan:  Patient has a current master individualized treatment plan.  See Epic treatment plan for more information.    Mariangel Patiño, LICSW

## 2022-01-06 ENCOUNTER — HOSPITAL ENCOUNTER (OUTPATIENT)
Dept: BEHAVIORAL HEALTH | Facility: CLINIC | Age: 57
End: 2022-01-06
Attending: PSYCHIATRY & NEUROLOGY
Payer: COMMERCIAL

## 2022-01-06 PROCEDURE — 90853 GROUP PSYCHOTHERAPY: CPT | Mod: GT,95

## 2022-01-06 PROCEDURE — 90853 GROUP PSYCHOTHERAPY: CPT | Mod: GT,95 | Performed by: SOCIAL WORKER

## 2022-01-06 ASSESSMENT — ANXIETY QUESTIONNAIRES
GAD7 TOTAL SCORE: 14
4. TROUBLE RELAXING: NEARLY EVERY DAY
2. NOT BEING ABLE TO STOP OR CONTROL WORRYING: MORE THAN HALF THE DAYS
7. FEELING AFRAID AS IF SOMETHING AWFUL MIGHT HAPPEN: SEVERAL DAYS
GAD7 TOTAL SCORE: 14
7. FEELING AFRAID AS IF SOMETHING AWFUL MIGHT HAPPEN: SEVERAL DAYS
GAD7 TOTAL SCORE: 14
1. FEELING NERVOUS, ANXIOUS, OR ON EDGE: MORE THAN HALF THE DAYS
3. WORRYING TOO MUCH ABOUT DIFFERENT THINGS: MORE THAN HALF THE DAYS
5. BEING SO RESTLESS THAT IT IS HARD TO SIT STILL: SEVERAL DAYS
6. BECOMING EASILY ANNOYED OR IRRITABLE: NEARLY EVERY DAY

## 2022-01-06 NOTE — GROUP NOTE
Service Modality:  Video Visit     Telemedicine Visit: The patient's condition can be safely assessed and treated via synchronous audio and visual telemedicine encounter.       Reason for Telemedicine Visit: Services only offered telehealth and due to COVID-19.     Originating Site (Patient Location): Patient's home     Distant Site (Provider Location): Provider Remote Setting- Home Office     Consent:  The patient/guardian has verbally consented to: the potential risks and benefits of telemedicine (video visit) versus in person care; bill my insurance or make self-payment for services provided; and responsibility for payment of non-covered services.      Patient would like the video invitation sent by:  My Chart     Mode of Communication:  Video Conference via Medical Zoom     As the provider I attest to compliance with applicable laws and regulations related to telemedicine.    Psychotherapy Group Note    PATIENT'S NAME: Dash Arizmendi  MRN:   [id number]  :   1965  ACCT. NUMBER: [id number]  DATE OF SERVICE: 22  START TIME: 11:00 AM  END TIME: 11:55 AM  FACILITATOR: Mariangel Patiño [facility]  TOPIC:  EBP Group: Coping Skills  [facility] 55+ Program  TRACK: A2    NUMBER OF PARTICIPANTS: 7    Summary of Group / Topics Discussed:  Coping Skills: Relapse Planning: Patients discussed and increased understanding of how anticipating and planning for possible increased symptoms is a proactive way to reduce the likelihood of relapse.  Patients shared individualized factors that may lead to increased symptoms and decompensation in functioning.  Each patient developed a relapse prevention plan designed to help them recognize when they may have increasing symptoms requiring them to take action and notify their key supports and care team.      Patient Session Goals / Objectives:    Identify and understand what factors may contribute to symptom relapse.    Identify actions that may be taken to manage  increased symptoms/stressors.    Create an individualized written relapse plan    Problem solve barriers to plan creation and implementation    Share relapse plan with key support people        Patient Participation / Response:  Fully participated with the group by sharing personal reflections / insights and openly received / provided feedback with other participants.    Demonstrated understanding of topics discussed through group discussion and participation    Treatment Plan:  Patient has a current master individualized treatment plan.  See Epic treatment plan for more information.    Mariangel Patiño, MARCOSSW

## 2022-01-06 NOTE — GROUP NOTE
"Process Group Note    PATIENT'S NAME: Dash Arizmendi  MRN:   [id number]  :   1965  ACCT. NUMBER: [id number]  DATE OF SERVICE: 22  START TIME:  9:00 AM  END TIME:  9:50 AM  FACILITATOR: Lana Botello LGSW  TOPIC:  Process Group    Diagnoses:  296.89 Bipolar II Disorder Depressed.  296.32 (F33.1) Major Depressive Disorder, Recurrent Episode, Moderate _  300.02 (F41.1) Generalized Anxiety Disorder.       [facility] 55+ [facility]  TRACK: A2    NUMBER OF PARTICIPANTS: 7                                      Service Modality:  Video Visit     Telemedicine Visit: The patient's condition can be safely assessed and treated via synchronous audio and visual telemedicine encounter.      Reason for Telemedicine Visit: Services only offered telehealth    Originating Site (Patient Location): Patient's home    Distant Site (Provider Location): Provider Remote Setting- Home Office    Consent:  The patient/guardian has verbally consented to: the potential risks and benefits of telemedicine (video visit) versus in person care; bill my insurance or make self-payment for services provided; and responsibility for payment of non-covered services.     Patient would like the video invitation sent by:  My Chart    Mode of Communication:  Video Conference via Medical Zoom    As the provider I attest to compliance with applicable laws and regulations related to telemedicine.                Data:    Session content: At the start of this group, patients were invited to check in by identifying themselves, describing their current emotional status, and identifying issues to address in this group.   Area(s) of treatment focus addressed in this session included Symptom Management, Personal Safety and Community Resources/Discharge Planning.  Client reported feeling tired today.  He is feeling \"anticipation\" today with how today will go for him.  Client briefly reflected on political unrest that triggered high anxiety last year.  He " "expressed gratitude that there were no current \"external stimuli\" that are contributing to his anxiety this year.  Client reflected on his time in group and how it helped him set up a routine and practice mindfulness in the mornings.  He hopes to find a activity to replace group therapy.  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  Peers offered supportive validation and offered relevant resources.  Writer suggested client use the Carter Ahead DBT skill to prepare for his interviews.  Writer also encouraged client to incorporate self-soothing activities into his routine.  Client plans on practicing brain games in addition to preparing for his interviews.  Peers offered best wishes and lessons learned from client on his last day.  Client expressed gratitude for group.  He reported enjoying the Yazmin GRANADOS talks that were incorporated into the EBP skils groups.  Client denied safety concerns.    Writer met with client in between groups to complete discharge summary.     Therapeutic Interventions/Treatment Strategies:  Psychotherapist offered support, feedback and validation and reinforced use of skills. Treatment modalities used include Cognitive Behavioral Therapy. Interventions include Behavioral Activation: Encouraged strategies to reduce individual procrastination and increase motivation by increasing goal-directed activities to enhance mood and reduce symptoms. and Coping Skills: Discussed use of self-soothe skills to decrease distress in the body and Discussed Carter Ahead skill.    Assessment:    Patient response:   Patient responded to session by accepting feedback, giving feedback and listening    Possible barriers to participation / learning include: and no barriers identified    Health Issues:   None reported       Substance Use Review:   Substance Use: No active concerns identified.    Mental " Status/Behavioral Observations  Appearance:   Appropriate   Eye Contact:   Good   Psychomotor Behavior: Normal   Attitude:   Cooperative  Interested Friendly Pleasant  Orientation:   All  Speech   Rate / Production: Normal    Volume:  Normal   Mood:    Anxious   Affect:    Appropriate   Thought Content:   Clear and Safety denies any current safety concerns including suicidal ideation, self-harm, and homicidal ideation  Thought Form:  Coherent  Logical     Insight:    Good     Plan:     Safety Plan: No current safety concerns identified.  Recommended that patient call 911 or go to the local ED should there be a change in any of these risk factors.     Barriers to treatment: None identified    Patient Contracts (see media tab):  None    Substance Use: Not addressed in session     Continue or Discharge: Patient is being discharged today. See Treatment Plan and Discharge Summary.       ERMELINDA Spencer  January 6, 2022

## 2022-01-07 ASSESSMENT — ANXIETY QUESTIONNAIRES: GAD7 TOTAL SCORE: 14

## 2022-01-13 ENCOUNTER — HOSPITAL ENCOUNTER (OUTPATIENT)
Dept: BEHAVIORAL HEALTH | Facility: CLINIC | Age: 57
End: 2022-01-13
Attending: PSYCHIATRY & NEUROLOGY
Payer: COMMERCIAL

## 2022-01-13 PROBLEM — F31.81 DEPRESSED BIPOLAR II DISORDER (H): Status: ACTIVE | Noted: 2022-01-13

## 2022-01-13 PROCEDURE — 90853 GROUP PSYCHOTHERAPY: CPT | Mod: GT,95

## 2022-01-13 NOTE — GROUP NOTE
Service Modality:  Video Visit     Telemedicine Visit: The patient's condition can be safely assessed and treated via synchronous audio and visual telemedicine encounter.      Reason for Telemedicine Visit: Services only offered telehealth    Originating Site (Patient Location): Patient's home    Distant Site (Provider Location): Provider Remote Setting- Home Office    Consent:  The patient/guardian has verbally consented to: the potential risks and benefits of telemedicine (video visit) versus in person care; bill my insurance or make self-payment for services provided; and responsibility for payment of non-covered services.     Patient would like the video invitation sent by:  My Chart    Mode of Communication:  Video Conference via Medical Zoom    As the provider I attest to compliance with applicable laws and regulations related to telemedicine.      Process Group Note    PATIENT'S NAME: Dash Arizmendi  MRN:   [id number]  :   1965  ACCT. NUMBER: [id number]  DATE OF SERVICE: 22  START TIME:  1:00 PM  END TIME:  1:50 PM  FACILITATOR: Concepcion Coto LMFT  TOPIC:  Process Group    Diagnoses:      [facility] 55+ Program  TRACK: Clinic 1    NUMBER OF PARTICIPANTS: 8          Data:    Session content: At the start of this group, patients were invited to check in by identifying themselves, describing their current emotional status, and identifying issues to address in this group.   Area(s) of treatment focus addressed in this session included Symptom Management  Processed feeling anxious around upcoming procedures to treat his anxiety symptoms. Reports goal is to run errands and focus on activities to distract from anxious thoughts and self harm urges. Reports high urges to se self harm and using distraction and grounding to cope with urges.Identifies taking medications and no safety concerns. Feeling grateful for making progress with his mental  "health.    Therapeutic Interventions/Treatment Strategies:  Psychotherapist offered support, feedback and validation and reinforced use of skills. Treatment modalities used include Cognitive Behavioral Therapy. Interventions include Cognitive Restructuring:  Explored impact of ineffective thoughts / distortions on mood and activity and Coping Skills: Discussed how the use of intentional \"in the moment\" actions can help reduce distress.    Assessment:    Patient response:   Patient responded to session by accepting feedback, giving feedback and listening    Possible barriers to participation / learning include: and no barriers identified    Health Issues:   None reported       Substance Use Review:   Substance Use: No active concerns identified.    Mental Status/Behavioral Observations  Appearance:   Appropriate   Eye Contact:   Good   Psychomotor Behavior: Normal   Attitude:   Cooperative   Orientation:   All  Speech   Rate / Production: Normal    Volume:  Normal   Mood:    Normal  Affect:    Appropriate   Thought Content:   Clear  Thought Form:  Coherent  Logical     Insight:    Fair     Plan:     Safety Plan: No current safety concerns identified.  Recommended that patient call 911 or go to the local ED should there be a change in any of these risk factors.     Barriers to treatment: None identified    Patient Contracts (see media tab):  None    Substance Use: Not addressed in session     Continue or Discharge: Patient will continue in 55+ Program (55+) as planned. Patient is likely to benefit from learning and using skills as they work toward the goals identified in their treatment plan.      NICOLETTE Saenz  January 13, 2022    "

## 2022-01-13 NOTE — PROGRESS NOTES
Acknowledgement of Current Treatment Plan       I have reviewed my treatment plan with my therapist on 01/13/21.   I agree with the plan as it is written in the electronic health record. ([facility])    Name:      Signature:  Dash Arizmendi         Can not sign due to covid 19   Flex Casey MD  Psychiatrist/Medical Director   Flex Casey MD on 1/13/2022 at 4:39 PM   NICOLETTE Saenz  Psychotherapist Concepcion WILL

## 2022-01-20 ENCOUNTER — HOSPITAL ENCOUNTER (OUTPATIENT)
Dept: BEHAVIORAL HEALTH | Facility: CLINIC | Age: 57
End: 2022-01-20
Attending: PSYCHIATRY & NEUROLOGY
Payer: COMMERCIAL

## 2022-01-20 PROCEDURE — 90853 GROUP PSYCHOTHERAPY: CPT | Mod: GT,95

## 2022-01-20 NOTE — GROUP NOTE
Service Modality:  Video Visit     Telemedicine Visit: The patient's condition can be safely assessed and treated via synchronous audio and visual telemedicine encounter.      Reason for Telemedicine Visit: Services only offered telehealth    Originating Site (Patient Location): Patient's home    Distant Site (Provider Location): Provider Remote Setting- Home Office    Consent:  The patient/guardian has verbally consented to: the potential risks and benefits of telemedicine (video visit) versus in person care; bill my insurance or make self-payment for services provided; and responsibility for payment of non-covered services.     Patient would like the video invitation sent by:  My Chart    Mode of Communication:  Video Conference via Medical Zoom    As the provider I attest to compliance with applicable laws and regulations related to telemedicine.      Process Group Note    PATIENT'S NAME: Dash Arizmendi  MRN:   [id number]  :   1965  ACCT. NUMBER: [id number]  DATE OF SERVICE: 22  START TIME:  1:00 PM  END TIME:  1:50 PM  FACILITATOR: Concepcion Coto LMFT  TOPIC:  Process Group    Diagnoses:  296.89 Bipolar II Disorder Depressed.  296.32 (F33.1) Major Depressive Disorder, Recurrent Episode, Moderate _  300.02 (F41.1) Generalized Anxiety Disorder.          [facility] 55+ Program  TRACK: Clinic 1    NUMBER OF PARTICIPANTS: 6          Data:    Session content: At the start of this group, patients were invited to check in by identifying themselves, describing their current emotional status, and identifying issues to address in this group.   Area(s) of treatment focus addressed in this session included Symptom Management.  Feeling an increase in anxiety. Reports noticing having more symptoms of panic. Reports coping with spending time outside. Reports stress from upcoming ketamine appointment. Goal is to present and mindful to challenge catastrophizing ruminations.  Reports taking medications and no safety concerns. Feeling grateful for his dog and wife for support.     Therapeutic Interventions/Treatment Strategies:  Psychotherapist offered support, feedback and validation and reinforced use of skills. Treatment modalities used include Cognitive Behavioral Therapy. Interventions include Cognitive Restructuring:  Explored impact of ineffective thoughts / distortions on mood and activity.    Assessment:    Patient response:   Patient responded to session by accepting feedback, giving feedback and verbalizing understanding    Possible barriers to participation / learning include: and no barriers identified    Health Issues:   None reported       Substance Use Review:   Substance Use: No active concerns identified.    Mental Status/Behavioral Observations  Appearance:   Appropriate   Eye Contact:   Good   Psychomotor Behavior: Normal   Attitude:   Cooperative   Orientation:   All  Speech   Rate / Production: Normal    Volume:  Normal   Mood:    Anxious   Affect:    Appropriate   Thought Content:   Clear  Thought Form:  Coherent  Logical     Insight:    Fair     Plan:     Safety Plan: No current safety concerns identified.  Recommended that patient call 911 or go to the local ED should there be a change in any of these risk factors.     Barriers to treatment: None identified    Patient Contracts (see media tab):  None    Substance Use: Not addressed in session     Continue or Discharge: Patient will continue in 55+ Program (55+) as planned. Patient is likely to benefit from learning and using skills as they work toward the goals identified in their treatment plan.      NICOLETTE Saenz  January 20, 2022

## 2022-01-27 ENCOUNTER — HOSPITAL ENCOUNTER (OUTPATIENT)
Dept: BEHAVIORAL HEALTH | Facility: CLINIC | Age: 57
End: 2022-01-27
Attending: PSYCHIATRY & NEUROLOGY
Payer: COMMERCIAL

## 2022-01-27 PROCEDURE — 90853 GROUP PSYCHOTHERAPY: CPT | Mod: GT,95

## 2022-01-27 NOTE — GROUP NOTE
Process Group Note    PATIENT'S NAME: Dash Arizmendi  MRN:   [id number]  :   1965  ACCT. NUMBER: [id number]  DATE OF SERVICE: 22  START TIME:  1:00 PM  END TIME:  2:50 PM  FACILITATOR: Rochelle Christine LGSW  TOPIC:  Process Group    Diagnoses:  296.89 Bipolar II Disorder Depressed.  296.32 (F33.1) Major Depressive Disorder, Recurrent Episode, Moderate _  300.02 (F41.1) Generalized Anxiety Disorder.    [facility] 55+ Program  TRACK: Clinic 1     NUMBER OF PARTICIPANTS: 6     Service Modality:  Video Visit     Telemedicine Visit: The patient's condition can be safely assessed and treated via synchronous audio and visual telemedicine encounter.      Reason for Telemedicine Visit: Services only offered telehealth    Originating Site (Patient Location): Patient's home    Distant Site (Provider Location): Provider Remote Setting- Home Office    Consent:  The patient/guardian has verbally consented to: the potential risks and benefits of telemedicine (video visit) versus in person care; bill my insurance or make self-payment for services provided; and responsibility for payment of non-covered services.     Patient would like the video invitation sent by:  My Chart    Mode of Communication:  Video Conference via Medical Zoom    As the provider I attest to compliance with applicable laws and regulations related to telemedicine.                   Data:    Session content: At the start of this group, patients were invited to check in by identifying themselves, describing their current emotional status, and identifying issues to address in this group.   Area(s) of treatment focus addressed in this session included Symptom Management, Personal Safety and Community Resources/Discharge Planning.    Aren reports feeling sad and alone today. He reports that today was his second ketamine therapy treatment. He reports that he did not feel good after. He is not putting a lot of weight on it. He reports he met with  his psychiatrist and upped his anti-depressants. He reports that he had to do a lot of preparation for the ketamine treatment. He reports that it has been a full week of reminders that he is going through it and that he is not  normal.  He is working on goals of journaling this process. He reports that he wants to be able to look back and see how he felt. He is focused on skills of having daniel and self compassion. He denies barriers. He denies safety concerns. He reports curtting a few days ago. He reports that he cuts on his forearms.  He reports no chemical use. He is taking his medication. He is grateful that his new psychiatrist is really trying to help him in the meantime.     During the second part of group we discussed IMPROVE (the moment).     Therapeutic Interventions/Treatment Strategies:  Psychotherapist offered support, feedback and validation and reinforced use of skills. Treatment modalities used include Motivational Interviewing, Cognitive Behavioral Therapy and Dialectical Behavioral Therapy. Interventions include Cognitive Restructuring:  Explored impact of ineffective thoughts / distortions on mood and activity, Facilitated recognition of the connection between negative thoughts and negative core beliefs and Assisted patient in identifying new neutral/positive core beliefs, Coping Skills: Assisted patient in understanding the purpose of planning / creating / participating / sharing in positive experiences, Symptoms Management: Promoted understanding of their diagnoses and how it impacts their functioning and Emotions Management:  Discussed barriers to emotional regulation.    Assessment:    Patient response:   Patient responded to session by accepting feedback, listening, focusing on goals, being attentive and accepting support    Possible barriers to participation / learning include: and no barriers identified    Health Issues:   None reported       Substance Use Review:   Substance Use: No  active concerns identified.    Mental Status/Behavioral Observations  Appearance:   Appropriate   Eye Contact:   Good   Psychomotor Behavior: Normal   Attitude:   Cooperative   Orientation:   All  Speech   Rate / Production: Normal    Volume:  Normal   Mood:    Normal  Affect:    Appropriate   Thought Content:   Clear and Safety denies any current safety concerns including suicidal ideation, self-harm, and homicidal ideation, reports  thoughts of self-harm and Safety concerns have stayed the same  Thought Form:  Coherent  Logical     Insight:    Good     Plan:     Safety Plan: Committed to safety and agreed to follow previously developed safety coping plan.      Barriers to treatment: None identified    Patient Contracts (see media tab):  None    Substance Use: Not addressed in session     Continue or Discharge: Patient will continue in 55+ Program (55+) as planned. Patient is likely to benefit from learning and using skills as they work toward the goals identified in their treatment plan.      Rochelle Christine, ERMELINDA  January 27, 2022

## 2022-02-03 ENCOUNTER — HOSPITAL ENCOUNTER (OUTPATIENT)
Dept: BEHAVIORAL HEALTH | Facility: CLINIC | Age: 57
End: 2022-02-03
Attending: PSYCHIATRY & NEUROLOGY
Payer: COMMERCIAL

## 2022-02-03 PROCEDURE — 90853 GROUP PSYCHOTHERAPY: CPT | Mod: HQ,GT,95

## 2022-02-03 PROCEDURE — 90853 GROUP PSYCHOTHERAPY: CPT | Mod: GT,95

## 2022-02-03 NOTE — GROUP NOTE
Service Modality:  Video Visit     Telemedicine Visit: The patient's condition can be safely assessed and treated via synchronous audio and visual telemedicine encounter.      Reason for Telemedicine Visit: Services only offered telehealth    Originating Site (Patient Location): Patient's home    Distant Site (Provider Location): Provider Remote Setting- Home Office    Consent:  The patient/guardian has verbally consented to: the potential risks and benefits of telemedicine (video visit) versus in person care; bill my insurance or make self-payment for services provided; and responsibility for payment of non-covered services.     Patient would like the video invitation sent by:  My Chart    Mode of Communication:  Video Conference via Medical Zoom    As the provider I attest to compliance with applicable laws and regulations related to telemedicine.      Process Group Note    PATIENT'S NAME: Dash Arizmendi  MRN:   [id number]  :   1965  ACCT. NUMBER: [id number]  DATE OF SERVICE: 22  START TIME:  1:00 PM  END TIME:  1:50 PM 2-2:50pm  FACILITATOR: Concepcion Coto LMFT  TOPIC:  Process Group    Diagnoses:  296.89 Bipolar II Disorder Depressed.  296.32 (F33.1) Major Depressive Disorder, Recurrent Episode, Moderate _  300.02 (F41.1) Generalized Anxiety Disorder.       [facility] 55 Program  TRACK: Clinic 1     NUMBER OF PARTICIPANTS:           Data:    Session content: At the start of this group, patients were invited to check in by identifying themselves, describing their current emotional status, and identifying issues to address in this group.   Area(s) of treatment focus addressed in this session included Symptom Management.  Reports feeling disappointed that he is not seeing a significant improvement in his mood and depression symptoms after his ketamine sessions. Reports goal is to have more acceptance around his mood. Reports taking medications and no safety  "concerns. Feeling grateful for support of group and his wife. Participated in education group around acceptance.    Therapeutic Interventions/Treatment Strategies:  Psychotherapist offered support, feedback and validation and reinforced use of skills. Treatment modalities used include Cognitive Behavioral Therapy. Interventions include Coping Skills: Discussed how the use of intentional \"in the moment\" actions can help reduce distress and Reviewed patients current calming practices and discussed a more formal way of practicing and accessing skills.    Assessment:    Patient response:   Patient responded to session by accepting feedback, giving feedback and listening    Possible barriers to participation / learning include: and no barriers identified    Health Issues:   None reported       Substance Use Review:   Substance Use: No active concerns identified.    Mental Status/Behavioral Observations  Appearance:   Appropriate   Eye Contact:   Good   Psychomotor Behavior: Normal   Attitude:   Cooperative   Orientation:   All  Speech   Rate / Production: Normal    Volume:  Normal   Mood:    Anxious  Depressed   Affect:    Appropriate   Thought Content:   Clear  Thought Form:  Coherent  Logical     Insight:    Fair     Plan:     Safety Plan: No current safety concerns identified.  Recommended that patient call 911 or go to the local ED should there be a change in any of these risk factors.     Barriers to treatment: None identified    Patient Contracts (see media tab):  None    Substance Use: Not addressed in session     Continue or Discharge: Patient will continue in 55+ Program (55+) as planned. Patient is likely to benefit from learning and using skills as they work toward the goals identified in their treatment plan.      NICOLETTE Saenz  February 3, 2022    "

## 2022-02-10 ENCOUNTER — HOSPITAL ENCOUNTER (OUTPATIENT)
Dept: BEHAVIORAL HEALTH | Facility: CLINIC | Age: 57
End: 2022-02-10
Attending: PSYCHIATRY & NEUROLOGY
Payer: COMMERCIAL

## 2022-02-10 PROCEDURE — 90853 GROUP PSYCHOTHERAPY: CPT | Mod: HQ,GT,95

## 2022-02-10 PROCEDURE — 90853 GROUP PSYCHOTHERAPY: CPT | Mod: GT,95

## 2022-02-10 NOTE — GROUP NOTE
Service Modality:  Video Visit     Telemedicine Visit: The patient's condition can be safely assessed and treated via synchronous audio and visual telemedicine encounter.      Reason for Telemedicine Visit: Services only offered telehealth    Originating Site (Patient Location): Patient's home    Distant Site (Provider Location): Provider Remote Setting- Home Office    Consent:  The patient/guardian has verbally consented to: the potential risks and benefits of telemedicine (video visit) versus in person care; bill my insurance or make self-payment for services provided; and responsibility for payment of non-covered services.     Patient would like the video invitation sent by:  My Chart    Mode of Communication:  Video Conference via Medical Zoom    As the provider I attest to compliance with applicable laws and regulations related to telemedicine.      Process Group Note    PATIENT'S NAME: Dash Arizmendi  MRN:   [id number]  :   1965  ACCT. NUMBER: [id number]  DATE OF SERVICE: 2/10/22  START TIME:  1:00 PM 2:00 pm  END TIME:  1:50 PM 2:50pm  FACILITATOR: Concepcion Coto LMFT  TOPIC:  Process Group    Diagnoses:  296.89 Bipolar II Disorder Depressed.  296.32 (F33.1) Major Depressive Disorder, Recurrent Episode, Moderate _  300.02 (F41.1) Generalized Anxiety Disorder.      [facility] 55+ Program  TRACK: Clinic 1     NUMBER OF PARTICIPANTS: 7          Data:    Session content: At the start of this group, patients were invited to check in by identifying themselves, describing their current emotional status, and identifying issues to address in this group.   Area(s) of treatment focus addressed in this session included Symptom Management.  Processed feeling down and worried about all the free time he has in his week without the IOP program. Reports self harm incident of cutting and being able to distract from self harm. Reports goal is to help out his wife since getting  "injured and taking care of things around his home. Reports no safety concerns, no substance use and taking medications. Grateful for his family and the progress he has made. Engaged in EBP topic on assertiveness.    Therapeutic Interventions/Treatment Strategies:  Psychotherapist offered support, feedback and validation and reinforced use of skills. Treatment modalities used include Cognitive Behavioral Therapy. Interventions include Coping Skills: Discussed how the use of intentional \"in the moment\" actions can help reduce distress.    Assessment:    Patient response:   Patient responded to session by verbalizing understanding    Possible barriers to participation / learning include: and no barriers identified    Health Issues:   None reported       Substance Use Review:   Substance Use: No active concerns identified.    Mental Status/Behavioral Observations  Appearance:   Appropriate   Eye Contact:   Good   Psychomotor Behavior: Normal   Attitude:   Cooperative   Orientation:   All  Speech   Rate / Production: Normal    Volume:  Normal   Mood:    Depressed   Affect:    Worrisome   Thought Content:   Clear  Thought Form:  Coherent  Logical     Insight:    Fair     Plan:     Safety Plan: No current safety concerns identified.  Recommended that patient call 911 or go to the local ED should there be a change in any of these risk factors.     Barriers to treatment: None identified    Patient Contracts (see media tab):  None    Substance Use: Not addressed in session     Continue or Discharge: Patient will continue in 55+ Program (55+) as planned. Patient is likely to benefit from learning and using skills as they work toward the goals identified in their treatment plan.      NICOLETTE Saenz  February 10, 2022    "

## 2022-02-17 ENCOUNTER — HOSPITAL ENCOUNTER (OUTPATIENT)
Dept: BEHAVIORAL HEALTH | Facility: CLINIC | Age: 57
End: 2022-02-17
Attending: PSYCHIATRY & NEUROLOGY
Payer: COMMERCIAL

## 2022-02-17 PROCEDURE — 90853 GROUP PSYCHOTHERAPY: CPT | Mod: HQ,GT,95

## 2022-02-17 PROCEDURE — 90853 GROUP PSYCHOTHERAPY: CPT | Mod: GT,95

## 2022-02-17 NOTE — GROUP NOTE
Service Modality:  Video Visit     Telemedicine Visit: The patient's condition can be safely assessed and treated via synchronous audio and visual telemedicine encounter.      Reason for Telemedicine Visit: Services only offered telehealth    Originating Site (Patient Location): Patient's home    Distant Site (Provider Location): Provider Remote Setting- Home Office    Consent:  The patient/guardian has verbally consented to: the potential risks and benefits of telemedicine (video visit) versus in person care; bill my insurance or make self-payment for services provided; and responsibility for payment of non-covered services.     Patient would like the video invitation sent by:  My Chart    Mode of Communication:  Video Conference via Medical Zoom    As the provider I attest to compliance with applicable laws and regulations related to telemedicine.      Process Group Note    PATIENT'S NAME: Dash Arizmendi  MRN:   [id number]  :   1965  ACCT. NUMBER: [id number]  DATE OF SERVICE: 22  START TIME:  1:00 PM  END TIME:  1:50 PM  FACILITATOR: Concepcion Coto LMFT  TOPIC:  Process Group    Diagnoses:  296.89 Bipolar II Disorder Depressed.  296.32 (F33.1) Major Depressive Disorder, Recurrent Episode, Moderate _  300.02 (F41.1) Generalized Anxiety Disorder.      [facility] 55+ Program  TRACK: Clinic 1     NUMBER OF PARTICIPANTS: 7          Data:    Session content: At the start of this group, patients were invited to check in by identifying themselves, describing their current emotional status, and identifying issues to address in this group.   Area(s) of treatment focus addressed in this session included Symptom Management  Patient processed increased symptoms of depression,low motivation and anhedonia. Identifies receiving support from wife to attend to ADL's. Goal is to shower for the day and take medications. Denies any safety concerns or urges to self harm. Grateful  "for support of wife and proud he was able to get up for group today. Participated and engaged in 2nd hour EBP group around wellness goal planning    Therapeutic Interventions/Treatment Strategies:  Psychotherapist offered support, feedback and validation and reinforced use of skills. Treatment modalities used include Cognitive Behavioral Therapy. Interventions include Behavioral Activation: Explored how behaviors effect mood and interact with thoughts and feelings and Coping Skills: Discussed how the use of intentional \"in the moment\" actions can help reduce distress.    Assessment:    Patient response:   Patient responded to session by accepting feedback, giving feedback and listening    Possible barriers to participation / learning include: and no barriers identified    Health Issues:   None reported       Substance Use Review:   Substance Use: No active concerns identified.    Mental Status/Behavioral Observations  Appearance:   Appropriate   Eye Contact:   Good   Psychomotor Behavior: Normal   Attitude:   Cooperative   Orientation:   All  Speech   Rate / Production: Normal    Volume:  Normal   Mood:    Depressed  Anhedonia  Affect:    Subdued   Thought Content:   Clear  Thought Form:  Coherent  Logical     Insight:    Fair     Plan:     Safety Plan: No current safety concerns identified.  Recommended that patient call 911 or go to the local ED should there be a change in any of these risk factors.     Barriers to treatment: None identified    Patient Contracts (see media tab):  None    Substance Use: Not addressed in session     Continue or Discharge: Patient will continue in 55+ Program (55+) as planned. Patient is likely to benefit from learning and using skills as they work toward the goals identified in their treatment plan.      NICOLETTE Saenz  February 17, 2022    "

## 2022-02-24 ENCOUNTER — HOSPITAL ENCOUNTER (OUTPATIENT)
Dept: BEHAVIORAL HEALTH | Facility: CLINIC | Age: 57
End: 2022-02-24
Attending: PSYCHIATRY & NEUROLOGY
Payer: COMMERCIAL

## 2022-02-24 PROCEDURE — 90853 GROUP PSYCHOTHERAPY: CPT | Mod: HQ,GT,95 | Performed by: SOCIAL WORKER

## 2022-02-24 PROCEDURE — 90853 GROUP PSYCHOTHERAPY: CPT | Mod: GT,95 | Performed by: SOCIAL WORKER

## 2022-02-24 NOTE — GROUP NOTE
Service Modality:  Video Visit     Telemedicine Visit: The patient's condition can be safely assessed and treated via synchronous audio and visual telemedicine encounter.       Reason for Telemedicine Visit: Services only offered telehealth and due to COVID-19.     Originating Site (Patient Location): Patient's home     Distant Site (Provider Location): Provider Remote Setting- Home Office     Consent:  The patient/guardian has verbally consented to: the potential risks and benefits of telemedicine (video visit) versus in person care; bill my insurance or make self-payment for services provided; and responsibility for payment of non-covered services.      Patient would like the video invitation sent by:  My Chart     Mode of Communication:  Video Conference via Medical Zoom     As the provider I attest to compliance with applicable laws and regulations related to telemedicine.    Process Group Note    PATIENT'S NAME: Dash Arizmendi  MRN:   [id number]  :   1965  ACCT. NUMBER: [id number]  DATE OF SERVICE: 22  START TIME:  1:00 PM  END TIME:  2:00 PM  FACILITATOR: Mariangel Patiño LICSW  TOPIC:  Process Group   EBP topic: Compassion. End time: 3:00pm    Diagnoses:  296.89 Bipolar II Disorder Depressed.  296.32 (F33.1) Major Depressive Disorder, Recurrent Episode, Moderate _  300.02 (F41.1) Generalized Anxiety Disorder.      [facility] 55+ Program  TRACK: 55[facility]    NUMBER OF PARTICIPANTS: 9          Data:    Session content: At the start of this group, patients were invited to check in by identifying themselves, describing their current emotional status, and identifying issues to address in this group.   Area(s) of treatment focus addressed in this session included Symptom Management, Personal Safety, Community Resources/Discharge Planning, Abstinence/Relapse Prevention, Develop / Improve Independent Living Skills and Develop Socialization / Interpersonal Relationship  Skills.    Aren reports less depressed mood. Denies S/I or safety issues. He processed his treatment with Ketamine infusions. He has had two treatments. He feels more calm and hopeful. He reports setting up his medication in pill box and is working on medication compliance. He also has been practicing mindfulness by looking at his guitar. He is going to make steps to play his guitar for leisure and enjoyment. He is going to talk with his PCP about medical marijuana. He states that there was a 4 month period is his recovery where he did not need medication when he had used microdoses of marijuana.  He has had 900 days in his sobriety. Abisai  engaged by listening and sharing about Compassion which was the EBP TOPIC.        Therapeutic Interventions/Treatment Strategies:  Psychotherapist offered support, feedback and validation and reinforced use of skills. Treatment modalities used include Cognitive Behavioral Therapy.    Assessment:    Patient response:   Patient responded to session by listening, focusing on goals, being attentive, accepting support and verbalizing understanding    Possible barriers to participation / learning include: and no barriers identified    Health Issues:   None reported       Substance Use Review:   Substance Use: No active concerns identified. He has had 900 days in his sobriety.    Mental Status/Behavioral Observations  Appearance:   Appropriate   Eye Contact:   Good   Psychomotor Behavior: Normal   Attitude:   Cooperative  Interested Pleasant  Orientation:   All  Speech   Rate / Production: Normal    Volume:  Normal   Mood:    Less depressed mood, more calm  Affect:    Appropriate   Thought Content:   Clear and Safety denies any current safety concerns including suicidal ideation, self-harm, and homicidal ideation  Thought Form:  Coherent  Goal Directed  Logical     Insight:    Good     Plan:     Safety Plan: No current safety concerns identified.  Recommended that patient call 911 or go  to the local ED should there be a change in any of these risk factors.     Barriers to treatment: None identified    Patient Contracts (see media tab):  None    Substance Use: Provided validation with having 900 days in his sobriety.     Continue or Discharge: Patient will continue in 55+ Program (55+) as planned. Patient is likely to benefit from learning and using skills as they work toward the goals identified in their treatment plan.  Aren  will continue for relapse prevention education for mental health and chemical health recovery.      Mariangel Patiño, [facility]  February 24, 2022

## 2022-02-25 NOTE — ADDENDUM NOTE
Encounter addended by: Mariangel Patiño [facility] on: 2/25/2022 9:38 AM   Actions taken: Clinical Note Signed

## 2022-03-03 ENCOUNTER — HOSPITAL ENCOUNTER (OUTPATIENT)
Dept: BEHAVIORAL HEALTH | Facility: CLINIC | Age: 57
End: 2022-03-03
Attending: PSYCHIATRY & NEUROLOGY
Payer: COMMERCIAL

## 2022-03-03 PROCEDURE — 90853 GROUP PSYCHOTHERAPY: CPT | Mod: HQ,GT,95

## 2022-03-03 PROCEDURE — 90853 GROUP PSYCHOTHERAPY: CPT | Mod: GT,95

## 2022-03-03 NOTE — GROUP NOTE
Service Modality:  Video Visit     Telemedicine Visit: The patient's condition can be safely assessed and treated via synchronous audio and visual telemedicine encounter.      Reason for Telemedicine Visit: Services only offered telehealth    Originating Site (Patient Location): Patient's home    Distant Site (Provider Location): Provider Remote Setting- Home Office    Consent:  The patient/guardian has verbally consented to: the potential risks and benefits of telemedicine (video visit) versus in person care; bill my insurance or make self-payment for services provided; and responsibility for payment of non-covered services.     Patient would like the video invitation sent by:  My Chart    Mode of Communication:  Video Conference via Medical Zoom    As the provider I attest to compliance with applicable laws and regulations related to telemedicine.      Process Group Note    PATIENT'S NAME: Dash Arizmendi  MRN:   [id number]  :   1965  ACCT. NUMBER: [id number]  DATE OF SERVICE: 3/03/22  START TIME:  1:00 PM  END TIME:  1:50 PM  FACILITATOR: Concepcion Coto LMFT  TOPIC:  Process Group    Diagnoses:  296.89 Bipolar II Disorder Depressed.  296.32 (F33.1) Major Depressive Disorder, Recurrent Episode, Moderate _  300.02 (F41.1) Generalized Anxiety Disorder.      [facility] 55+ Program  TRACK: clinic 1     NUMBER OF PARTICIPANTS: 4          Data:    Session content: At the start of this group, patients were invited to check in by identifying themselves, describing their current emotional status, and identifying issues to address in this group.   Area(s) of treatment focus addressed in this session included Symptom Management.  Processed feeling an improvement in mood from most recent ketamine session. Reports experiencing a significant change and decrease in his depression. Identifies having more energy and feeling positive about anniversary of his marriage. Reports goal is  "to celebrate with his wife later and continue to stay positive with his mood. Reports taking medications, no substance use or safety concerns. Feeling grateful for improvement with symptoms.     Therapeutic Interventions/Treatment Strategies:  Psychotherapist offered support, feedback and validation and reinforced use of skills. Treatment modalities used include Cognitive Behavioral Therapy. Interventions include Coping Skills: Discussed how the use of intentional \"in the moment\" actions can help reduce distress and Assisted patient in understanding the purpose of planning / creating / participating / sharing in positive experiences.    Assessment:    Patient response:   Patient responded to session by accepting feedback, giving feedback and listening    Possible barriers to participation / learning include: and no barriers identified    Health Issues:   None reported       Substance Use Review:   Substance Use: No active concerns identified.    Mental Status/Behavioral Observations  Appearance:   Appropriate   Eye Contact:   Good   Psychomotor Behavior: Normal   Attitude:   Cooperative   Orientation:   All  Speech   Rate / Production: Normal    Volume:  Normal   Mood:    Normal  Affect:    Appropriate   Thought Content:   Clear  Thought Form:  Coherent  Logical     Insight:    Fair     Plan:     Safety Plan: No current safety concerns identified.  Recommended that patient call 911 or go to the local ED should there be a change in any of these risk factors.     Barriers to treatment: None identified    Patient Contracts (see media tab):  None    Substance Use: Not addressed in session     Continue or Discharge: Patient will continue in 55+ Program (55+) as planned. Patient is likely to benefit from learning and using skills as they work toward the goals identified in their treatment plan.      NICOLETTE Saenz  March 3, 2022    "

## 2022-03-10 ENCOUNTER — HOSPITAL ENCOUNTER (OUTPATIENT)
Dept: BEHAVIORAL HEALTH | Facility: CLINIC | Age: 57
Discharge: HOME | End: 2022-03-10
Attending: PSYCHIATRY & NEUROLOGY
Payer: COMMERCIAL

## 2022-03-10 PROCEDURE — 90853 GROUP PSYCHOTHERAPY: CPT | Mod: GT,95 | Performed by: COUNSELOR

## 2022-03-10 PROCEDURE — 90853 GROUP PSYCHOTHERAPY: CPT | Mod: GT,95

## 2022-03-10 NOTE — GROUP NOTE
Psychotherapy Group Note    PATIENT'S NAME: Dash Arizmendi  MRN:   [id number]  :   1965  ACCT. NUMBER: [id number]  DATE OF SERVICE: 3/10/22  START TIME:  2:00 PM  END TIME:  2:50 PM  FACILITATOR: Arabella Sargent LMFT  TOPIC:  EBP Group: Behavioral Activation  [facility] 55+ Program  TRACK: clinic 1    NUMBER OF PARTICIPANTS: 8    Summary of Group / Topics Discussed:  Behavioral Activation: Activity Scheduling:Patients explored how they currently spend their time, and how specific behaviors impact thoughts and feelings.  The group explored the effect of negative and positive activities on mood states and thought patterns.  Patients identified activities that help to improve mood and thinking patterns, and developed a plan to implement positive activities between sessions.      Patient Session Goals / Objectives:    Identify impact of current behaviors on thoughts and mood    Identify 2-3 behavioral changes that could have a positive impact on thoughts and mood    Prepare to make desired behavioral change: Create a change plan / activity schedule    Discussed activity planning around living values                                    Service Modality:  Video Visit     Telemedicine Visit: The patient's condition can be safely assessed and treated via synchronous audio and visual telemedicine encounter.      Reason for Telemedicine Visit: Services only offered telehealth    Originating Site (Patient Location): Patient's home    Distant Site (Provider Location): Provider Remote Setting- Home Office    Consent:  The patient/guardian has verbally consented to: the potential risks and benefits of telemedicine (video visit) versus in person care; bill my insurance or make self-payment for services provided; and responsibility for payment of non-covered services.     Patient would like the video invitation sent by:  My Chart    Mode of Communication:  Video Conference via Medical Zoom    As the provider I  attest to compliance with applicable laws and regulations related to telemedicine.            Patient Participation / Response:  Fully participated with the group by sharing personal reflections / insights and openly received / provided feedback with other participants.    Demonstrated understanding of topics discussed through group discussion and participation and Practiced skills in session    Treatment Plan:  Patient has a current master individualized treatment plan.  See Epic treatment plan for more information.    Arabella Sargent LMFT

## 2022-03-10 NOTE — GROUP NOTE
Process Group Note    PATIENT'S NAME: Dash Arizmendi  MRN:   [id number]  :   1965  ACCT. NUMBER: [id number]  DATE OF SERVICE: 3/10/22  START TIME:  1:00 PM  END TIME:  1:50 PM  FACILITATOR: Car Bustos LMFT  TOPIC:  Process Group    Diagnoses:  296.89 Bipolar II Disorder Depressed.  296.32 (F33.1) Major Depressive Disorder, Recurrent Episode, Moderate _  300.02 (F41.1) Generalized Anxiety Disorder.       [facility] 55+ Program  TRACK: Clinic 1    NUMBER OF PARTICIPANTS: 8    Service Modality:  Video Visit     Telemedicine Visit: The patient's condition can be safely assessed and treated via synchronous audio and visual telemedicine encounter.      Reason for Telemedicine Visit: Services only offered telehealth and due to COVID-19.    Originating Site (Patient Location): Patient's home    Distant Site (Provider Location): Provider Remote Setting- Home Office    Consent:  The patient/guardian has verbally consented to: the potential risks and benefits of telemedicine (video visit) versus in person care; bill my insurance or make self-payment for services provided; and responsibility for payment of non-covered services.     Patient would like the video invitation sent by:  My Chart    Mode of Communication:  Video Conference via Medical Zoom    As the provider I attest to compliance with applicable laws and regulations related to telemedicine.            Data:    Session content: At the start of this group, patients were invited to check in by identifying themselves, describing their current emotional status, and identifying issues to address in this group.   Area(s) of treatment focus addressed in this session included Symptom Management, Personal Safety and Community Resources/Discharge Planning.  Patient reported feeling tired, dizzy and unbalanced.   Patient discussed working toward journaling about his ketamine therapy experience today.   For skills they will use to address their goal(s),  patient identified journaling.   A barrier to working toward their goal(s) and/or addressing mental health symptoms the patient identified was none identified.  Patient reported no safety concerns and/or self-injurious behaviors. Patient reported no substance use. Patient reported they are taking their medications as prescribed.   Patient reported feeling proud/grateful for his providers communicating with each other.  Patient discussed with the treatment group that they are working toward journaling about his ketamine therapy experience today.    Therapeutic Interventions/Treatment Strategies:  Psychotherapist offered support, feedback and validation and reinforced use of skills. Treatment modalities used include Motivational Interviewing and Cognitive Behavioral Therapy. Interventions include Coping Skills: Assisted patient in identifying 1-2 healthy distraction skills to reduce overall distress, Symptoms Management: Promoted understanding of their diagnoses and how it impacts their functioning and Emotions Management:  Discussed barriers to emotional regulation.    Assessment:    Patient response:   Patient responded to session by accepting feedback, giving feedback, listening, focusing on goals, being attentive and accepting support    Possible barriers to participation / learning include: Feeling tired, dizzy and unbalanced from his Ketamine therapy today.    Health Issues:   None reported       Substance Use Review:   Substance Use: No active concerns identified.    Mental Status/Behavioral Observations  Appearance:   Appropriate   Eye Contact:   Good   Psychomotor Behavior: Normal   Attitude:   Cooperative   Orientation:   All  Speech   Rate / Production: Normal    Volume:  Normal   Mood:    Normal Tired  Affect:    Appropriate   Thought Content:   Clear and Safety denies any current safety concerns including suicidal ideation, self-harm, and homicidal ideation  Thought Form:  Coherent  Logical      Insight:    Good     Plan:     Safety Plan: No current safety concerns identified.      Barriers to treatment: None identified    Patient Contracts (see media tab):  None    Substance Use: Provided encouragement towards sobriety     Continue or Discharge: Patient will continue in 55+ Program (55+)  as planned. Patient is likely to benefit from learning and using skills as they work toward the goals identified in their treatment plan.      Car Bustos, NICOLETTE  March 10, 2022

## 2022-03-14 ASSESSMENT — ANXIETY QUESTIONNAIRES
7. FEELING AFRAID AS IF SOMETHING AWFUL MIGHT HAPPEN: MORE THAN HALF THE DAYS
6. BECOMING EASILY ANNOYED OR IRRITABLE: NEARLY EVERY DAY
GAD7 TOTAL SCORE: 10
4. TROUBLE RELAXING: SEVERAL DAYS
1. FEELING NERVOUS, ANXIOUS, OR ON EDGE: MORE THAN HALF THE DAYS
3. WORRYING TOO MUCH ABOUT DIFFERENT THINGS: SEVERAL DAYS
7. FEELING AFRAID AS IF SOMETHING AWFUL MIGHT HAPPEN: MORE THAN HALF THE DAYS
2. NOT BEING ABLE TO STOP OR CONTROL WORRYING: SEVERAL DAYS
5. BEING SO RESTLESS THAT IT IS HARD TO SIT STILL: NOT AT ALL
GAD7 TOTAL SCORE: 10
GAD7 TOTAL SCORE: 10

## 2022-03-15 ASSESSMENT — ANXIETY QUESTIONNAIRES: GAD7 TOTAL SCORE: 10

## 2022-03-17 ENCOUNTER — HOSPITAL ENCOUNTER (OUTPATIENT)
Dept: BEHAVIORAL HEALTH | Facility: CLINIC | Age: 57
Discharge: HOME | End: 2022-03-17
Attending: PSYCHIATRY & NEUROLOGY
Payer: COMMERCIAL

## 2022-03-17 PROCEDURE — 90853 GROUP PSYCHOTHERAPY: CPT | Mod: GT,95

## 2022-03-17 PROCEDURE — 90853 GROUP PSYCHOTHERAPY: CPT | Mod: HQ,GT,95

## 2022-03-17 NOTE — GROUP NOTE
Service Modality:  Video Visit     Telemedicine Visit: The patient's condition can be safely assessed and treated via synchronous audio and visual telemedicine encounter.      Reason for Telemedicine Visit: Services only offered telehealth    Originating Site (Patient Location): Patient's home    Distant Site (Provider Location): Provider Remote Setting- Home Office    Consent:  The patient/guardian has verbally consented to: the potential risks and benefits of telemedicine (video visit) versus in person care; bill my insurance or make self-payment for services provided; and responsibility for payment of non-covered services.     Patient would like the video invitation sent by:  My Chart    Mode of Communication:  Video Conference via Medical Zoom    As the provider I attest to compliance with applicable laws and regulations related to telemedicine.      Process Group Note    PATIENT'S NAME: Dash Arizmendi  MRN:   [id number]  :   1965  ACCT. NUMBER: [id number]  DATE OF SERVICE: 3/17/22  START TIME:  1:00 PM  END TIME:  1:50 PM  FACILITATOR: Concepcion Coto LMFT  TOPIC:  Process Group    Diagnoses:  296.89 Bipolar II Disorder Depressed.  296.32 (F33.1) Major Depressive Disorder, Recurrent Episode, Moderate _  300.02 (F41.1) Generalized Anxiety Disorder.      [facility] 55+ Program  TRACK: Clinic 1   NUMBER OF PARTICIPANTS: 7          Data:    Session content: At the start of this group, patients were invited to check in by identifying themselves, describing their current emotional status, and identifying issues to address in this group.   Area(s) of treatment focus addressed in this session included Symptom Management.  Processed feeling an improvement in his mood after his ketamine treatment this am. Reports he is feeling more hopeful about treatments and noticing some days of relief from his depressed mood. Reports his goal is to get outside for some period of time  this afternoon. Does not see any barriers to this goal. Identifies taking medications, no substance use or safety concerns. Feeling grateful for his wife and the support she gives to him. Second hour EBP participated in identifying personal strengths.     Therapeutic Interventions/Treatment Strategies:  Psychotherapist offered support, feedback and validation and reinforced use of skills. Treatment modalities used include Cognitive Behavioral Therapy. Interventions include Behavioral Activation: Encouraged strategies to reduce individual procrastination and increase motivation by increasing goal-directed activities to enhance mood and reduce symptoms..    Assessment:    Patient response:   Patient responded to session by accepting feedback, giving feedback, listening and verbalizing understanding    Possible barriers to participation / learning include: and no barriers identified    Health Issues:   None reported       Substance Use Review:   Substance Use: No active concerns identified.    Mental Status/Behavioral Observations  Appearance:   Appropriate   Eye Contact:   Good   Psychomotor Behavior: Normal   Attitude:   Cooperative   Orientation:   All  Speech   Rate / Production: Normal    Volume:  Normal   Mood:    Normal  Affect:    Appropriate   Thought Content:   Clear  Thought Form:  Coherent  Logical     Insight:    Fair     Plan:     Safety Plan: No current safety concerns identified.  Recommended that patient call 911 or go to the local ED should there be a change in any of these risk factors.     Barriers to treatment: None identified    Patient Contracts (see media tab):  None    Substance Use: Not addressed in session     Continue or Discharge: Patient will continue in 55+ Program (55+) as planned. Patient is likely to benefit from learning and using skills as they work toward the goals identified in their treatment plan.      NICOLETTE Saenz  March 17, 2022

## 2022-03-24 ENCOUNTER — HOSPITAL ENCOUNTER (OUTPATIENT)
Dept: BEHAVIORAL HEALTH | Facility: CLINIC | Age: 57
Discharge: HOME | End: 2022-03-24
Attending: PSYCHIATRY & NEUROLOGY
Payer: COMMERCIAL

## 2022-03-24 PROCEDURE — 90853 GROUP PSYCHOTHERAPY: CPT | Mod: GT,95

## 2022-03-24 PROCEDURE — 90853 GROUP PSYCHOTHERAPY: CPT | Mod: HQ,GT,95

## 2022-03-24 NOTE — GROUP NOTE
Service Modality:  Video Visit     Telemedicine Visit: The patient's condition can be safely assessed and treated via synchronous audio and visual telemedicine encounter.      Reason for Telemedicine Visit: Services only offered telehealth    Originating Site (Patient Location): Patient's home    Distant Site (Provider Location): Provider Remote Setting- Home Office    Consent:  The patient/guardian has verbally consented to: the potential risks and benefits of telemedicine (video visit) versus in person care; bill my insurance or make self-payment for services provided; and responsibility for payment of non-covered services.     Patient would like the video invitation sent by:  My Chart    Mode of Communication:  Video Conference via Medical Zoom    As the provider I attest to compliance with applicable laws and regulations related to telemedicine.      Process Group Note    PATIENT'S NAME: Dash Arizmendi  MRN:   [id number]  :   1965  ACCT. NUMBER: [id number]  DATE OF SERVICE: 3/24/22  START TIME:  1:00 PM  END TIME:  1:50 PM  FACILITATOR: Concepcion Coto LMFT  TOPIC:  Process Group    Diagnoses:  296.89 Bipolar II Disorder Depressed.  296.32 (F33.1) Major Depressive Disorder, Recurrent Episode, Moderate _  300.02 (F41.1) Generalized Anxiety Disorder.      [facility] 55+ Program  TRACK: clinic 1    NUMBER OF PARTICIPANTS: 8          Data:    Session content: At the start of this group, patients were invited to check in by identifying themselves, describing their current emotional status, and identifying issues to address in this group.   Area(s) of treatment focus addressed in this session included Symptom Management.  Processed feelings of shame and guilt  since ketamine treatment. Unable to identify trigger for thoughts and explored ways to process emotions and distract to more positive or neutral thoughts. Reports goal is to get some movement in for the day and  "connect with his wife. Reports taking medications, no subtance use or safety concerns. Feeling grateful for the support of his wife. Participated and engaged in EBP group around communication for the second hour.    Therapeutic Interventions/Treatment Strategies:  Psychotherapist offered support, feedback and validation and reinforced use of skills. Treatment modalities used include Cognitive Behavioral Therapy. Interventions include Behavioral Activation: Explored how behaviors effect mood and interact with thoughts and feelings and Coping Skills: Discussed how the use of intentional \"in the moment\" actions can help reduce distress.    Assessment:    Patient response:   Patient responded to session by accepting feedback, giving feedback and listening    Possible barriers to participation / learning include: and no barriers identified    Health Issues:   None reported       Substance Use Review:   Substance Use: No active concerns identified.    Mental Status/Behavioral Observations  Appearance:   Appropriate   Eye Contact:   Good   Psychomotor Behavior: Normal   Attitude:   Cooperative   Orientation:   All  Speech   Rate / Production: Normal    Volume:  Normal   Mood:    Sad   Affect:    Appropriate  Subdued   Thought Content:   Clear  Thought Form:  Coherent  Logical     Insight:    Fair     Plan:     Safety Plan: No current safety concerns identified.  Recommended that patient call 911 or go to the local ED should there be a change in any of these risk factors.     Barriers to treatment: None identified    Patient Contracts (see media tab):  None    Substance Use: Not addressed in session     Continue or Discharge: Patient will continue in 55+ Program (55+) as planned. Patient is likely to benefit from learning and using skills as they work toward the goals identified in their treatment plan.      NICOLETTE Saenz  March 24, 2022    "

## 2022-03-31 ENCOUNTER — HOSPITAL ENCOUNTER (OUTPATIENT)
Dept: BEHAVIORAL HEALTH | Facility: CLINIC | Age: 57
Discharge: HOME | End: 2022-03-31
Attending: PSYCHIATRY & NEUROLOGY
Payer: COMMERCIAL

## 2022-03-31 PROCEDURE — 90853 GROUP PSYCHOTHERAPY: CPT | Mod: HQ,GT,95

## 2022-03-31 PROCEDURE — 90853 GROUP PSYCHOTHERAPY: CPT | Mod: GT,95

## 2022-03-31 NOTE — GROUP NOTE
Service Modality:  Video Visit     Telemedicine Visit: The patient's condition can be safely assessed and treated via synchronous audio and visual telemedicine encounter.      Reason for Telemedicine Visit: Services only offered telehealth    Originating Site (Patient Location): Patient's home    Distant Site (Provider Location): Provider Remote Setting- Home Office    Consent:  The patient/guardian has verbally consented to: the potential risks and benefits of telemedicine (video visit) versus in person care; bill my insurance or make self-payment for services provided; and responsibility for payment of non-covered services.     Patient would like the video invitation sent by:  My Chart    Mode of Communication:  Video Conference via Medical Zoom    As the provider I attest to compliance with applicable laws and regulations related to telemedicine.      Process Group Note    PATIENT'S NAME: Dash Arizmendi  MRN:   [id number]  :   1965  ACCT. NUMBER: [id number]  DATE OF SERVICE: 3/31/22  START TIME:  1:00 PM  END TIME:  1:50 PM  FACILITATOR: Concepcion Coto LMFT  TOPIC:  Process Group    Diagnoses:  296.89 Bipolar II Disorder Depressed.  296.32 (F33.1) Major Depressive Disorder, Recurrent Episode, Moderate _  300.02 (F41.1) Generalized Anxiety Disorder.          [facility] 55+ Program  TRACK: Clinic 1    NUMBER OF PARTICIPANTS: 7          Data:    Session content: At the start of this group, patients were invited to check in by identifying themselves, describing their current emotional status, and identifying issues to address in this group.   Area(s) of treatment focus addressed in this session included Symptom Management.  Processed feeling down and experiencing some past traumas coming up after ketamine treatments. Reports being intentional to use grounding skills to cope with trauma triggers. Reports goal for the day is to be mindful and stay present. Reports taking  "medications, no substance use or safety concerns. Feeling proud of continuing to focus on progress made with improving mood. Participating in emotion mgmt ebp for 2nd hour.    Therapeutic Interventions/Treatment Strategies:  Psychotherapist offered support, feedback and validation and reinforced use of skills. Treatment modalities used include Cognitive Behavioral Therapy. Interventions include Coping Skills: Discussed use of self-soothe skills to decrease distress in the body and Discussed how the use of intentional \"in the moment\" actions can help reduce distress.    Assessment:    Patient response:   Patient responded to session by accepting feedback, giving feedback and verbalizing understanding    Possible barriers to participation / learning include: and no barriers identified    Health Issues:   None reported       Substance Use Review:   Substance Use: No active concerns identified.    Mental Status/Behavioral Observations  Appearance:   Appropriate   Eye Contact:   Good   Psychomotor Behavior: Normal   Attitude:   Cooperative   Orientation:   All  Speech   Rate / Production: Normal    Volume:  Normal   Mood:    Normal Sad   Affect:    Appropriate  Subdued   Thought Content:   Clear  Thought Form:  Coherent  Logical     Insight:    Fair     Plan:     Safety Plan: No current safety concerns identified.  Recommended that patient call 911 or go to the local ED should there be a change in any of these risk factors.     Barriers to treatment: None identified    Patient Contracts (see media tab):  None    Substance Use: Not addressed in session     Continue or Discharge: Patient will continue in 55+ Program (55+) as planned. Patient is likely to benefit from learning and using skills as they work toward the goals identified in their treatment plan.      NICOLETTE Saenz  March 31, 2022    "

## 2022-10-15 ENCOUNTER — HEALTH MAINTENANCE LETTER (OUTPATIENT)
Age: 57
End: 2022-10-15

## 2022-12-03 ENCOUNTER — HEALTH MAINTENANCE LETTER (OUTPATIENT)
Age: 57
End: 2022-12-03

## 2023-02-08 NOTE — PROGRESS NOTES
"Patient seen via telemedicine.  Care discussed with treatment team staff.  Seen as part of 55+ day treatment.  My previous notes and Dr. Casey's reviewed.  HPI:  He continues in day treatment, and has a psychiatry appointment for Wednesday 12/8.  He is \"about the same\"  Cytomel was stopped by primary, but had no benefit in 4 days.  Current Outpatient Medications   Medication     levomilnacipran (FETZIMA ER) 40 MG 24 hr capsule     levothyroxine (SYNTHROID/LEVOTHROID) 50 MCG tablet     lithium ER (LITHOBID) 300 MG CR tablet     LORazepam (ATIVAN) 0.5 MG tablet     naltrexone (DEPADE/REVIA) 50 MG tablet     vitamin D3 (CHOLECALCIFEROL) 50 mcg (2000 units) tablet     No current facility-administered medications for this encounter.     He has not noted benefit from naltrexone    General appearance: good  Alert.   Affect: fair  Mood: fair    Speech:  normal.   Eye contact:  good.    Psychomotor behavior: normal  Gait: not observed.    Abnormal movements: none  Delusions: none  Hallucinations: none    Thoughts: logical  Associations: intact  Judgement:  good  Insight: good  Cognitions: intact in conversation  Memory:  intact in conversation  Orientation: normal    Not suicidal.    Imp:  Bipolar disorder  JUAN PABLO  And:   Patient Active Problem List   Diagnosis     Bipolar 1 disorder, depressed, moderate (H)     Bipolar 2 disorder (H)     Plan:  Continue 55+      Video-Visit Details    Type of service:  Video Visit    Video Start Time (time video started): 1440    Video End Time (time video stopped): 1455    Originating Location (pt. Location): Home    Distant Location (provider location): Provider remote location    Mode of Communication:  Video Conference via [facility]    Physician has received verbal consent for a Video Visit from the patient? Yes      Bobby Tabor MD           " Detail Level: Detailed Size Of Lesion In Cm (Optional): 0

## 2023-08-06 NOTE — PROGRESS NOTES
Adult Mental Health Partial Hospitalization Group Therapy Progress Note     Date: 10/18/18    Client Initial Individualized Goals for Treatment: be able to concentrate so can do my job, fix the depression so I can be happier and normal, don't want to spend all my time either working or sleeping/want a normal life, want to enjoy hobbies agian     Treatment Goals:  1) Safety:     Client will notify staff when needing assistance to develop or implement a coping plan to manage suicidal or self injurious urges.     Client will use coping plan for safety, as needed.  2) Symptom Management:     Abisai will process triggers in a self compassionate way and learn how to treat himself with kindness.    Abisai will Learn ways to process reactions to unhelpful comments and reframe his thought processes.  3) In Life Skills Abisai will:    Learn, practice, apply 2 skills/strategies for improved lifestyle balance with an emphasis on leisure and setting healthy boundaries for his time and energy.    Learn, practice, generalize 2 sensory or mindfulness based self regulation skills for improved concentration in his meaningful life roles, routines, and relationships.  4) Discharge preparation: Will develop an aftercare / transition plan by discharge date     Area of Treatment Focus:  Symptom Management, Personal Safety and Develop / Improve Independent Living Skills    Therapeutic Interventions/Treatment Strategies:  Support, Feedback, Safety Assessments, Structured Activity, Problem Solving, Clarification and Education    Response to Treatment Strategies:  Accepted Feedback, Listened, Focused on Goals, Attentive, Accepted Support and Alert    Name of Group:  OT life skills clinic: stress management  Time: 1:00-1:50  Group member attendance: 6 of 6    Description and Outcome: Abisai attended and participated in an structured life skills group where purposeful experiential intervention focuses on psychoeducation, exploration, practice, and  Pt transferred to [address] [place] [address] via w/c with security officers and RN. Personal belongings sent up with pt.    generalizing taught independent living skills. Through the use of supportive social interactions, structured therapeutic and functional tasks in context, group members work towards stabilizing and managing mental health symptoms for improved participation and function in valued roles, routines, relationships, and independent living.     Provided group with verbal and written psychoeducation material focused on lifestyle balance with an emphasis on leisure values in order to support stress management. Group members identified their top leisure values (how they want to spend their energy and time in a way that is restorative and rejuvinating). Neuroscience around parasympathetic nervous system activation was provided in context of leisure activity. They also considered their past leisure choices, current ones, and possibilities for the future including initial steps and problem solving barriers. Abisai is engaged and receives validation and support from his peers. He was able to identify 2 leisure activities that he is interested in slowly getting back into. He would benefit from additional opportunities to practice the content to be able to generalize it to his everyday life with increased intentionality, consistency, and efficacy in support of his psychiatric recovery.  He worked towards ITP goal number 3    Is this a Weekly Review of the Progress on the Treatment Plan?  No.

## 2024-01-07 ENCOUNTER — HEALTH MAINTENANCE LETTER (OUTPATIENT)
Age: 59
End: 2024-01-07

## 2025-01-25 ENCOUNTER — HEALTH MAINTENANCE LETTER (OUTPATIENT)
Age: 60
End: 2025-01-25